# Patient Record
Sex: FEMALE | Race: WHITE | NOT HISPANIC OR LATINO | Employment: OTHER | URBAN - METROPOLITAN AREA
[De-identification: names, ages, dates, MRNs, and addresses within clinical notes are randomized per-mention and may not be internally consistent; named-entity substitution may affect disease eponyms.]

---

## 2018-02-20 ENCOUNTER — OFFICE VISIT (OUTPATIENT)
Dept: URGENT CARE | Facility: CLINIC | Age: 63
End: 2018-02-20
Payer: MEDICARE

## 2018-02-20 VITALS
DIASTOLIC BLOOD PRESSURE: 62 MMHG | BODY MASS INDEX: 28.17 KG/M2 | RESPIRATION RATE: 20 BRPM | HEART RATE: 111 BPM | OXYGEN SATURATION: 98 % | HEIGHT: 64 IN | TEMPERATURE: 103 F | SYSTOLIC BLOOD PRESSURE: 111 MMHG | WEIGHT: 165 LBS

## 2018-02-20 DIAGNOSIS — R11.0 NAUSEA: ICD-10-CM

## 2018-02-20 DIAGNOSIS — R06.02 SOB (SHORTNESS OF BREATH): ICD-10-CM

## 2018-02-20 DIAGNOSIS — J10.1 INFLUENZA A: Primary | ICD-10-CM

## 2018-02-20 DIAGNOSIS — R05.9 COUGH: ICD-10-CM

## 2018-02-20 LAB
CTP QC/QA: YES
FLUAV AG NPH QL: POSITIVE
FLUBV AG NPH QL: NEGATIVE

## 2018-02-20 PROCEDURE — 99203 OFFICE O/P NEW LOW 30 MIN: CPT | Mod: 25,S$GLB,, | Performed by: FAMILY MEDICINE

## 2018-02-20 PROCEDURE — 3008F BODY MASS INDEX DOCD: CPT | Mod: S$GLB,,, | Performed by: FAMILY MEDICINE

## 2018-02-20 PROCEDURE — 94640 AIRWAY INHALATION TREATMENT: CPT | Mod: S$GLB,,, | Performed by: FAMILY MEDICINE

## 2018-02-20 PROCEDURE — 87804 INFLUENZA ASSAY W/OPTIC: CPT | Mod: 59,QW,S$GLB, | Performed by: FAMILY MEDICINE

## 2018-02-20 RX ORDER — LEFLUNOMIDE 20 MG/1
20 TABLET ORAL DAILY
COMMUNITY
End: 2021-06-16

## 2018-02-20 RX ORDER — FOLIC ACID 1 MG/1
1 TABLET ORAL DAILY
COMMUNITY
End: 2021-06-16

## 2018-02-20 RX ORDER — DULOXETIN HYDROCHLORIDE 60 MG/1
60 CAPSULE, DELAYED RELEASE ORAL 2 TIMES DAILY
COMMUNITY
End: 2021-06-16 | Stop reason: SDUPTHER

## 2018-02-20 RX ORDER — HYDROXYCHLOROQUINE SULFATE 200 MG/1
200 TABLET, FILM COATED ORAL DAILY
COMMUNITY
End: 2021-06-16

## 2018-02-20 RX ORDER — GABAPENTIN 300 MG/1
300 CAPSULE ORAL 3 TIMES DAILY
COMMUNITY
End: 2021-06-16

## 2018-02-20 RX ORDER — COLCHICINE 0.6 MG/1
0.6 CAPSULE ORAL
COMMUNITY
End: 2021-06-16

## 2018-02-20 RX ORDER — IBUPROFEN 800 MG/1
800 TABLET ORAL 3 TIMES DAILY
COMMUNITY
End: 2019-12-20 | Stop reason: SDUPTHER

## 2018-02-20 RX ORDER — ONDANSETRON 4 MG/1
4 TABLET, ORALLY DISINTEGRATING ORAL
Status: COMPLETED | OUTPATIENT
Start: 2018-02-20 | End: 2018-02-20

## 2018-02-20 RX ORDER — ALBUTEROL SULFATE 0.83 MG/ML
2.5 SOLUTION RESPIRATORY (INHALATION)
Status: COMPLETED | OUTPATIENT
Start: 2018-02-20 | End: 2018-02-20

## 2018-02-20 RX ORDER — ETODOLAC 400 MG/1
400 TABLET, FILM COATED ORAL 2 TIMES DAILY
COMMUNITY
End: 2021-06-16

## 2018-02-20 RX ORDER — IPRATROPIUM BROMIDE 0.5 MG/2.5ML
0.5 SOLUTION RESPIRATORY (INHALATION)
Status: COMPLETED | OUTPATIENT
Start: 2018-02-20 | End: 2018-02-20

## 2018-02-20 RX ADMIN — ALBUTEROL SULFATE 2.5 MG: 0.83 SOLUTION RESPIRATORY (INHALATION) at 03:02

## 2018-02-20 RX ADMIN — ONDANSETRON 4 MG: 4 TABLET, ORALLY DISINTEGRATING ORAL at 03:02

## 2018-02-20 RX ADMIN — IPRATROPIUM BROMIDE 0.5 MG: 0.5 SOLUTION RESPIRATORY (INHALATION) at 03:02

## 2018-02-20 NOTE — PROGRESS NOTES
"Subjective:       Patient ID: Gisele Meraz is a 62 y.o. female.    Vitals:  height is 5' 4" (1.626 m) and weight is 74.8 kg (165 lb). Her oral temperature is 102.7 °F (39.3 °C) (abnormal). Her blood pressure is 111/62 and her pulse is 111 (abnormal). Her respiration is 20 and oxygen saturation is 98%.     Chief Complaint: Cough    Cough   This is a new problem. The current episode started in the past 7 days. The problem has been gradually worsening. The problem occurs every few minutes. The cough is productive of sputum. Associated symptoms include chills, a fever, hemoptysis, nasal congestion, postnasal drip, shortness of breath and sweats. Pertinent negatives include no chest pain, ear congestion, ear pain, eye redness, headaches, heartburn, myalgias, rash, rhinorrhea, sore throat, weight loss or wheezing. Exacerbated by: heat. She has tried nothing for the symptoms. The treatment provided no relief. Her past medical history is significant for bronchitis and pneumonia. There is no history of asthma, bronchiectasis, COPD, emphysema or environmental allergies.     Review of Systems   Constitution: Positive for chills and fever. Negative for malaise/fatigue and weight loss.   HENT: Positive for postnasal drip. Negative for congestion, ear pain, hoarse voice, rhinorrhea and sore throat.    Eyes: Negative for discharge and redness.   Cardiovascular: Negative for chest pain, dyspnea on exertion and leg swelling.   Respiratory: Positive for cough, hemoptysis, shortness of breath and sputum production. Negative for wheezing.    Skin: Negative for rash.   Musculoskeletal: Negative for myalgias.   Gastrointestinal: Positive for diarrhea. Negative for abdominal pain, heartburn and nausea.   Neurological: Negative for headaches.   Allergic/Immunologic: Negative for environmental allergies.       Objective:      Physical Exam   Constitutional: She is oriented to person, place, and time. She appears well-developed and " well-nourished. She is cooperative.  Non-toxic appearance. She does not appear ill. No distress.   HENT:   Head: Normocephalic and atraumatic.   Right Ear: Hearing, tympanic membrane, external ear and ear canal normal.   Left Ear: Hearing, tympanic membrane, external ear and ear canal normal.   Nose: Nose normal. No mucosal edema, rhinorrhea or nasal deformity. No epistaxis. Right sinus exhibits no maxillary sinus tenderness and no frontal sinus tenderness. Left sinus exhibits no maxillary sinus tenderness and no frontal sinus tenderness.   Mouth/Throat: Uvula is midline, oropharynx is clear and moist and mucous membranes are normal. No trismus in the jaw. Normal dentition. No uvula swelling. No posterior oropharyngeal erythema.   Eyes: Conjunctivae and lids are normal. No scleral icterus.   Sclera clear bilat   Neck: Trachea normal, full passive range of motion without pain and phonation normal. Neck supple.   Cardiovascular: Normal rate, regular rhythm, normal heart sounds, intact distal pulses and normal pulses.    Pulmonary/Chest: No respiratory distress. She has no wheezes. She has no rales.   Labor breathing, accessories muscles use, decrease breath sound, decrease air movement.   Abdominal: Soft. Normal appearance and bowel sounds are normal. She exhibits no distension. There is no tenderness.   Musculoskeletal: Normal range of motion. She exhibits no edema or deformity.   Neurological: She is alert and oriented to person, place, and time. She exhibits normal muscle tone. Coordination normal.   Skin: Skin is warm, dry and intact. She is not diaphoretic. No pallor.   Psychiatric: She has a normal mood and affect. Her speech is normal and behavior is normal. Judgment and thought content normal. Cognition and memory are normal.   Nursing note and vitals reviewed.      Assessment:       1. Influenza A    2. Cough    3. SOB (shortness of breath)    4. Nausea        Plan:         Influenza A  -     Refer to  Emergency Dept.    Cough  -     Cancel: X-Ray Chest PA And Lateral; Future; Expected date: 02/20/2018  -     POCT Influenza A/B    SOB (shortness of breath)  -     albuterol nebulizer solution 2.5 mg; Take 3 mLs (2.5 mg total) by nebulization one time.  -     ipratropium 0.02 % nebulizer solution 0.5 mg; Take 2.5 mLs (0.5 mg total) by nebulization one time.  -     Refer to Emergency Dept.    Nausea  -     ondansetron disintegrating tablet 4 mg; Take 1 tablet (4 mg total) by mouth one time.    Patient  will take patient to [facility] ER for further treatment with Influenza A and SOB.    Juanjo Lo MD

## 2018-02-20 NOTE — PATIENT INSTRUCTIONS
Patient  will take patient to [facility] ER for further treatment with Influenza A and SOB.    Juanjo Lo MD.

## 2019-03-12 ENCOUNTER — OFFICE VISIT (OUTPATIENT)
Dept: URGENT CARE | Facility: CLINIC | Age: 64
End: 2019-03-12
Payer: MEDICARE

## 2019-03-12 VITALS
DIASTOLIC BLOOD PRESSURE: 84 MMHG | HEIGHT: 64 IN | HEART RATE: 86 BPM | SYSTOLIC BLOOD PRESSURE: 141 MMHG | OXYGEN SATURATION: 95 % | RESPIRATION RATE: 19 BRPM | WEIGHT: 145 LBS | TEMPERATURE: 98 F | BODY MASS INDEX: 24.75 KG/M2

## 2019-03-12 DIAGNOSIS — J98.01 ACUTE BRONCHOSPASM: ICD-10-CM

## 2019-03-12 DIAGNOSIS — B96.89 ACUTE BACTERIAL SINUSITIS: Primary | ICD-10-CM

## 2019-03-12 DIAGNOSIS — J01.90 ACUTE BACTERIAL SINUSITIS: Primary | ICD-10-CM

## 2019-03-12 DIAGNOSIS — J20.9 ACUTE PURULENT BRONCHITIS: ICD-10-CM

## 2019-03-12 PROCEDURE — 3008F PR BODY MASS INDEX (BMI) DOCUMENTED: ICD-10-PCS | Mod: CPTII,S$GLB,, | Performed by: INTERNAL MEDICINE

## 2019-03-12 PROCEDURE — 99214 PR OFFICE/OUTPT VISIT, EST, LEVL IV, 30-39 MIN: ICD-10-PCS | Mod: 25,S$GLB,, | Performed by: INTERNAL MEDICINE

## 2019-03-12 PROCEDURE — 96372 PR INJECTION,THERAP/PROPH/DIAG2ST, IM OR SUBCUT: ICD-10-PCS | Mod: S$GLB,,, | Performed by: INTERNAL MEDICINE

## 2019-03-12 PROCEDURE — 99214 OFFICE O/P EST MOD 30 MIN: CPT | Mod: 25,S$GLB,, | Performed by: INTERNAL MEDICINE

## 2019-03-12 PROCEDURE — 96372 THER/PROPH/DIAG INJ SC/IM: CPT | Mod: S$GLB,,, | Performed by: INTERNAL MEDICINE

## 2019-03-12 PROCEDURE — 3008F BODY MASS INDEX DOCD: CPT | Mod: CPTII,S$GLB,, | Performed by: INTERNAL MEDICINE

## 2019-03-12 RX ORDER — CALCIUM CARBONATE 600 MG
600 TABLET ORAL ONCE
COMMUNITY
End: 2021-06-16

## 2019-03-12 RX ORDER — BETAMETHASONE SODIUM PHOSPHATE AND BETAMETHASONE ACETATE 3; 3 MG/ML; MG/ML
9 INJECTION, SUSPENSION INTRA-ARTICULAR; INTRALESIONAL; INTRAMUSCULAR; SOFT TISSUE ONCE
Status: COMPLETED | OUTPATIENT
Start: 2019-03-12 | End: 2019-03-12

## 2019-03-12 RX ORDER — DONEPEZIL HYDROCHLORIDE 5 MG/1
5 TABLET, FILM COATED ORAL NIGHTLY
COMMUNITY
End: 2021-06-16

## 2019-03-12 RX ORDER — MEMANTINE HYDROCHLORIDE 5 MG/1
5 TABLET ORAL 2 TIMES DAILY
COMMUNITY
End: 2021-06-16

## 2019-03-12 RX ORDER — LANOLIN ALCOHOL/MO/W.PET/CERES
100 CREAM (GRAM) TOPICAL DAILY
COMMUNITY
End: 2021-06-16

## 2019-03-12 RX ORDER — DOXYCYCLINE 100 MG/1
100 CAPSULE ORAL 2 TIMES DAILY
Qty: 14 CAPSULE | Refills: 0 | Status: SHIPPED | OUTPATIENT
Start: 2019-03-12 | End: 2019-03-19

## 2019-03-12 RX ORDER — CHOLECALCIFEROL (VITAMIN D3) 25 MCG
1000 TABLET ORAL DAILY
COMMUNITY
End: 2021-06-16

## 2019-03-12 RX ADMIN — BETAMETHASONE SODIUM PHOSPHATE AND BETAMETHASONE ACETATE 9 MG: 3; 3 INJECTION, SUSPENSION INTRA-ARTICULAR; INTRALESIONAL; INTRAMUSCULAR; SOFT TISSUE at 05:03

## 2019-03-12 NOTE — PROGRESS NOTES
"Subjective:       Patient ID: Gisele Meraz is a 63 y.o. female.    Vitals:  height is 5' 4" (1.626 m) and weight is 65.8 kg (145 lb). Her oral temperature is 97.7 °F (36.5 °C). Her blood pressure is 141/84 (abnormal) and her pulse is 86. Her respiration is 19 and oxygen saturation is 95%.     Chief Complaint: URI    Patient started sneezing 5 days ago and complains of congestion dropping to chest yesterday.  Patient is on IV treatment for Rheumatoid Arthritis as of last month.      URI    This is a new problem. The current episode started in the past 7 days (5 days). The problem has been gradually worsening. There has been no fever. Associated symptoms include congestion, coughing and sneezing. Pertinent negatives include no abdominal pain, chest pain, diarrhea, dysuria, ear pain, headaches, joint pain, joint swelling, nausea, neck pain, plugged ear sensation, rash, rhinorrhea, sinus pain, sore throat, swollen glands, vomiting or wheezing. Associated symptoms comments: Fatigue. Treatments tried: Allegra. The treatment provided no relief.       Constitution: Negative for chills, fatigue and fever.   HENT: Positive for congestion. Negative for ear pain, sinus pain and sore throat.    Neck: Negative for neck pain and painful lymph nodes.   Cardiovascular: Negative for chest pain and leg swelling.   Eyes: Negative for double vision and blurred vision.   Respiratory: Positive for cough. Negative for shortness of breath and wheezing.    Gastrointestinal: Negative for abdominal pain, nausea, vomiting and diarrhea.   Genitourinary: Negative for dysuria, frequency, urgency and history of kidney stones.   Musculoskeletal: Negative for joint pain, joint swelling, muscle cramps and muscle ache.   Skin: Negative for color change, pale, rash and bruising.   Allergic/Immunologic: Positive for sneezing. Negative for seasonal allergies.   Neurological: Negative for dizziness, history of vertigo, light-headedness, passing out and " headaches.   Hematologic/Lymphatic: Negative for swollen lymph nodes.   Psychiatric/Behavioral: Negative for nervous/anxious, sleep disturbance and depression. The patient is not nervous/anxious.        Objective:      Physical Exam   Constitutional: She appears well-developed and well-nourished.   HENT:   Head: Normocephalic and atraumatic.   Nose: Mucosal edema (purulent mucous) present. Right sinus exhibits maxillary sinus tenderness. Left sinus exhibits maxillary sinus tenderness.   Eyes: Conjunctivae and EOM are normal. Pupils are equal, round, and reactive to light.   Neck: Normal range of motion. Neck supple.   Cardiovascular: Normal rate and regular rhythm.   Pulmonary/Chest: Effort normal. She has wheezes.   Nursing note and vitals reviewed.      Assessment:       1. Acute bacterial sinusitis    2. Acute purulent bronchitis    3. Acute bronchospasm        Plan:         Acute bacterial sinusitis  -     doxycycline (VIBRAMYCIN) 100 MG Cap; Take 1 capsule (100 mg total) by mouth 2 (two) times daily. for 7 days  Dispense: 14 capsule; Refill: 0    Acute purulent bronchitis    Acute bronchospasm  -     betamethasone acetate-betamethasone sodium phosphate injection 9 mg

## 2019-03-13 ENCOUNTER — NURSE TRIAGE (OUTPATIENT)
Dept: ADMINISTRATIVE | Facility: CLINIC | Age: 64
End: 2019-03-13

## 2019-03-13 NOTE — TELEPHONE ENCOUNTER
Was seen at urgent care yesterday as noted. Was given an injection and Vibramycin and been taken since visit. Since 9 pm has had 4 coughing spells. Not producing much sputum and she is cold.     Reason for Disposition   Message left on identified voice mail    Protocols used: NO CONTACT OR DUPLICATE CONTACT CALL-A-

## 2019-03-13 NOTE — TELEPHONE ENCOUNTER
Reason for Disposition   [1] Sinus infection AND [2] taking an antibiotic   Bacterial sinusitis, questions about    Protocols used: RECENT MEDICAL VISIT FOR ILLNESS FOLLOW-UP CALL-A-, SINUS INFECTION ON ANTIBIOTIC FOLLOW-UP CALL-A-AH

## 2019-12-20 ENCOUNTER — HOSPITAL ENCOUNTER (EMERGENCY)
Facility: HOSPITAL | Age: 64
Discharge: HOME | End: 2019-12-20
Attending: EMERGENCY MEDICINE
Payer: MEDICARE

## 2019-12-20 VITALS
HEART RATE: 83 BPM | RESPIRATION RATE: 22 BRPM | TEMPERATURE: 98 F | SYSTOLIC BLOOD PRESSURE: 116 MMHG | OXYGEN SATURATION: 99 % | DIASTOLIC BLOOD PRESSURE: 59 MMHG

## 2019-12-20 DIAGNOSIS — S06.9X1A CLOSED HEAD INJURY WITH BRIEF LOSS OF CONSCIOUSNESS: ICD-10-CM
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
## 2019-12-20 PROCEDURE — 25000003 PHARM REV CODE 250: Performed by: EMERGENCY MEDICINE

## 2019-12-20 PROCEDURE — 99284 EMERGENCY DEPT VISIT MOD MDM: CPT | Mod: 25,HCNC

## 2019-12-20 RX ORDER — ONDANSETRON 4 MG/1
4 TABLET, ORALLY DISINTEGRATING ORAL
Status: COMPLETED | OUTPATIENT
Start: 2019-12-20 | End: 2019-12-20

## 2019-12-20 RX ORDER — ACETAMINOPHEN 500 MG
1000 TABLET ORAL
Status: COMPLETED | OUTPATIENT
Start: 2019-12-20 | End: 2019-12-20

## 2019-12-20 RX ORDER — IBUPROFEN 800 MG/1
800 TABLET ORAL 3 TIMES DAILY
Qty: 12 TABLET | Refills: 0 | Status: SHIPPED | OUTPATIENT
Start: 2019-12-20 | End: 2019-12-24

## 2019-12-20 RX ADMIN — ACETAMINOPHEN 1000 MG: 500 TABLET ORAL at 09:12

## 2019-12-20 RX ADMIN — ONDANSETRON 4 MG: 4 TABLET, ORALLY DISINTEGRATING ORAL at 09:12

## 2019-12-21 NOTE — ED PROVIDER NOTES
Encounter Date: 2019    SCRIBE #1 NOTE: I, Tip Salinas, am scribing for, and in the presence of, Dr. Oden.       History   No chief complaint on file.    Time seen by provider: 9:33 PM on 2019      Gisele Meraz is a 64 y.o. female with a PMHx of pneumonia, arthritis, and fibromyalgia who presents to the ED for right sided head pain that started < 1 hour PTA. The  reports that the patient was trying to get up from her chair at a restaurant, when the patient lost her balance and fell backwards. Patient reports that she hit the back of her head on the wall.  denies the patient having LOC. Per patient, she is having pain to the right side of her head. Per patient, her headache is still present and is a sharper pain. The  states that the patient was ambulatory since the accident. The patient denies visual disturbances, LOC, vomiting, neck pain, SOB, abdominal pain, or any other complaint at this time. The patient has a PSHx of adenoidectomy and tonsillectomy.    The history is provided by the patient and the spouse.     Review of patient's allergies indicates:   Allergen Reactions    Penicillins Swelling     Past Medical History:   Diagnosis Date    Arthritis     Fibromyalgia     Pneumonia      Past Surgical History:   Procedure Laterality Date    ADENOIDECTOMY       SECTION      HYSTERECTOMY      TONSILLECTOMY       No family history on file.  Social History     Tobacco Use    Smoking status: Current Every Day Smoker    Smokeless tobacco: Never Used   Substance Use Topics    Alcohol use: Not on file    Drug use: Not on file     Review of Systems   Constitutional: Negative for fever.   HENT: Negative for congestion.    Eyes: Negative for visual disturbance.   Respiratory: Negative for wheezing.    Cardiovascular: Negative for chest pain.   Gastrointestinal: Negative for abdominal pain, diarrhea, nausea and vomiting.   Genitourinary: Negative for dysuria.    Musculoskeletal: Negative for back pain, joint swelling and neck pain.   Skin: Negative for rash.   Neurological: Positive for headaches. Negative for seizures and syncope.   Hematological: Does not bruise/bleed easily.   Psychiatric/Behavioral: Negative for confusion.       Physical Exam     Initial Vitals   BP Pulse Resp Temp SpO2   -- -- -- -- --      MAP       --         Physical Exam    Nursing note and vitals reviewed.  Constitutional: She appears well-nourished.   HENT:   Head: Normocephalic and atraumatic.   Right Ear: Hearing, tympanic membrane, external ear and ear canal normal. No hemotympanum.   Left Ear: Hearing, tympanic membrane, external ear and ear canal normal. No hemotympanum.   Eyes: Conjunctivae and EOM are normal.   Neck: Normal range of motion. Neck supple. No thyroid mass present.   Cardiovascular: Normal rate, regular rhythm and normal heart sounds. Exam reveals no gallop and no friction rub.    No murmur heard.  Pulmonary/Chest: Breath sounds normal. She has no wheezes. She has no rhonchi. She has no rales.   Abdominal: Soft. Normal appearance and bowel sounds are normal. There is no tenderness.   Neurological: She is alert and oriented to person, place, and time. She has normal strength. No cranial nerve deficit or sensory deficit.   Skin: Skin is warm and dry. No rash noted. No erythema.   Psychiatric: She has a normal mood and affect. Her speech is normal. Cognition and memory are normal.         ED Course   Procedures  Labs Reviewed - No data to display       Imaging Results    None          Medical Decision Making:   History:   Old Medical Records: I decided to obtain old medical records.  Clinical Tests:   Radiological Study: Ordered and Reviewed  ED Management:  This patient was interviewed and assessed emergently.  She is alert and sober.  There are no open wounds.  Patient denies neck pain or trauma to any additional bodily areas.  Patient had some improvement here with p.o.  Tylenol.  CT scan indicates no acute intracranial process.  Patient and  will be educated about closed-head injuries and suspected concussion.  They will be provided information regarding a concussion Clinic on the [place] and asked to follow up with this as soon as possible.  They are further educated about concerning signs and symptoms to look out for, these including vomiting, worsening headache, neurologic symptoms.  They are asked return for these or any new, concerning, or worsening symptoms immediately, as possibility for slow bleed does exist.  Patient and  agreeable with this plan for follow-up and monitoring at home and she is discharged in stable condition.            Scribe Attestation:   Scribe #1: I performed the above scribed service and the documentation accurately describes the services I performed. I attest to the accuracy of the note.    I, Dr. Arnulfo Oden, personally performed the services described in this documentation. All medical record entries made by the scribe were at my direction and in my presence.  I have reviewed the chart and agree that the record reflects my personal performance and is accurate and complete. Arnulfo Oden MD.  5:55 AM 12/21/2019                        Clinical Impression:       ICD-10-CM ICD-9-CM   1. Fall on same level from slipping, tripping or stumbling, initial encounter W01.0XXA E885.9   2. Closed head injury with brief loss of consciousness S06.9X9A 850.5         Disposition:   Disposition: Discharged  Condition: Stable                     Arnulfo Oden MD  12/21/19 0555

## 2019-12-21 NOTE — ED NOTES
"Pt states "Yes I feel better.  Can I please go home?" Pt still endorses that her head hurts but states "I just fell and bumped my head. It's fine. The X-ray will show the brain of a typical 65 year old!".  No Vomiting noted.  Bed remains in low and locked position, Bed rails up x 2 and call light remains within reach  "

## 2019-12-21 NOTE — ED NOTES
Patient identifiers for Gisele Meraz checked and correct.  LOC:  Gisele Meraz is awake, alert, and aware of environment with an appropriate affect. She is oriented x 3 and speaking appropriately.  APPEARANCE:  She is resting comfortably and in moderate distress. She is clean and well groomed, patient's clothing is properly fastened.  SKIN:  The skin is warm and dry. She has normal skin turgor and moist mucus membranes. Skin is intact; no bruising or breakdown noted.  MUSCULOSKELETAL:  She is moving all extremities well, no obvious deformities noted. Pulses intact.   RESPIRATORY:  Airway is open and patent. Respirations are spontaneous and non-labored with normal effort and rate.  CARDIAC:  She has a normal rate and rhythm. No peripheral edema noted. Capillary refill < 3 seconds.  ABDOMEN:  No distention noted.  Soft and non-tender upon palpation.  NEUROLOGICAL:  PERRL. Facial expression is symmetrical. Hand grasps are equal bilaterally. Normal sensation in all extremities when touched with finger.  Pt endorses profound dizziness and H/A after fall back into a wall striking head hard.  Pain to site.  No LOC but spouse states that she keeps falling asleep and is not acting herself.  No N/V noted.  Pt arouses easily.    Allergies reported:    Review of patient's allergies indicates:   Allergen Reactions    Penicillins Swelling     OTHER NOTES:

## 2020-09-17 ENCOUNTER — OFFICE VISIT (OUTPATIENT)
Dept: URGENT CARE | Facility: CLINIC | Age: 65
End: 2020-09-17
Payer: MEDICARE

## 2020-09-17 VITALS
OXYGEN SATURATION: 96 % | TEMPERATURE: 97 F | HEART RATE: 77 BPM | SYSTOLIC BLOOD PRESSURE: 134 MMHG | HEIGHT: 64 IN | RESPIRATION RATE: 16 BRPM | DIASTOLIC BLOOD PRESSURE: 81 MMHG | WEIGHT: 135 LBS | BODY MASS INDEX: 23.05 KG/M2

## 2020-09-17 DIAGNOSIS — M79.2 NEURALGIA: Primary | ICD-10-CM

## 2020-09-17 PROCEDURE — 96372 THER/PROPH/DIAG INJ SC/IM: CPT | Mod: S$GLB,,, | Performed by: INTERNAL MEDICINE

## 2020-09-17 PROCEDURE — 96372 PR INJECTION,THERAP/PROPH/DIAG2ST, IM OR SUBCUT: ICD-10-PCS | Mod: S$GLB,,, | Performed by: INTERNAL MEDICINE

## 2020-09-17 PROCEDURE — 99214 OFFICE O/P EST MOD 30 MIN: CPT | Mod: 25,S$GLB,, | Performed by: INTERNAL MEDICINE

## 2020-09-17 PROCEDURE — 99214 PR OFFICE/OUTPT VISIT, EST, LEVL IV, 30-39 MIN: ICD-10-PCS | Mod: 25,S$GLB,, | Performed by: INTERNAL MEDICINE

## 2020-09-17 RX ORDER — METHYLPREDNISOLONE 4 MG/1
TABLET ORAL
Qty: 1 PACKAGE | Refills: 0 | Status: SHIPPED | OUTPATIENT
Start: 2020-09-17 | End: 2021-06-16

## 2020-09-17 RX ORDER — KETOROLAC TROMETHAMINE 30 MG/ML
60 INJECTION, SOLUTION INTRAMUSCULAR; INTRAVENOUS
Status: COMPLETED | OUTPATIENT
Start: 2020-09-17 | End: 2020-09-17

## 2020-09-17 RX ADMIN — KETOROLAC TROMETHAMINE 60 MG: 30 INJECTION, SOLUTION INTRAMUSCULAR; INTRAVENOUS at 04:09

## 2020-09-17 NOTE — PROGRESS NOTES
"Subjective:       Patient ID: Gisele Meraz is a 65 y.o. female.    Vitals:  height is 5' 4" (1.626 m) and weight is 61.2 kg (135 lb). Her temperature is 97.4 °F (36.3 °C). Her blood pressure is 134/81 and her pulse is 77. Her respiration is 16 and oxygen saturation is 96%.     Chief Complaint: Arm Pain    Right arm spasms that began an hour ago. Pt has swlling     Arm Pain   The incident occurred less than 1 hour ago. There was no injury mechanism. The quality of the pain is described as shooting. The pain radiates to the right arm. The pain is at a severity of 10/10. The pain is severe. The pain has been constant since the incident. Pertinent negatives include no chest pain. The symptoms are aggravated by movement. She has tried nothing for the symptoms.       Constitution: Negative for chills, fatigue and fever.   HENT: Negative for congestion and sore throat.    Neck: Negative for painful lymph nodes.   Cardiovascular: Negative for chest pain and leg swelling.   Eyes: Negative for double vision and blurred vision.   Respiratory: Negative for cough and shortness of breath.    Gastrointestinal: Negative for nausea, vomiting and diarrhea.   Genitourinary: Negative for dysuria, frequency, urgency and history of kidney stones.   Musculoskeletal: Positive for pain and joint pain. Negative for joint swelling, muscle cramps and muscle ache.   Skin: Negative for color change, pale, rash and bruising.   Allergic/Immunologic: Negative for seasonal allergies.   Neurological: Negative for dizziness, history of vertigo, light-headedness, passing out and headaches.   Hematologic/Lymphatic: Negative for swollen lymph nodes.   Psychiatric/Behavioral: Negative for nervous/anxious, sleep disturbance and depression. The patient is not nervous/anxious.        Objective:      Physical Exam   Neck: Normal range of motion. Neck supple.   Abdominal: Normal appearance.   Neurological: She is alert.   Skin:         Comments: Neuralgic " pain R arm with ROM at shoulder; pain down upper arm to prox forearm;motor intact; pulses intact; skin warm and dry ;skin normal color   Nursing note and vitals reviewed.        Assessment:       1. Neuralgia        Plan:         Neuralgia  -     ketorolac injection 60 mg  -     methylPREDNISolone (MEDROL DOSEPACK) 4 mg tablet; use as directed  Dispense: 1 Package; Refill: 0

## 2021-03-26 ENCOUNTER — TELEPHONE (OUTPATIENT)
Dept: NEUROLOGY | Facility: CLINIC | Age: 66
End: 2021-03-26

## 2021-03-29 ENCOUNTER — TELEPHONE (OUTPATIENT)
Dept: NEUROLOGY | Facility: CLINIC | Age: 66
End: 2021-03-29

## 2021-03-29 ENCOUNTER — OFFICE VISIT (OUTPATIENT)
Dept: NEUROLOGY | Facility: CLINIC | Age: 66
End: 2021-03-29
Payer: MEDICARE

## 2021-03-29 VITALS
DIASTOLIC BLOOD PRESSURE: 87 MMHG | HEART RATE: 78 BPM | BODY MASS INDEX: 23.17 KG/M2 | HEIGHT: 64 IN | SYSTOLIC BLOOD PRESSURE: 146 MMHG

## 2021-03-29 DIAGNOSIS — F02.80 EARLY ONSET ALZHEIMER'S DEMENTIA WITHOUT BEHAVIORAL DISTURBANCE: ICD-10-CM

## 2021-03-29 DIAGNOSIS — G30.0 EARLY ONSET ALZHEIMER'S DEMENTIA WITHOUT BEHAVIORAL DISTURBANCE: ICD-10-CM

## 2021-03-29 PROCEDURE — 3288F FALL RISK ASSESSMENT DOCD: CPT | Mod: CPTII,S$GLB,, | Performed by: PSYCHIATRY & NEUROLOGY

## 2021-03-29 PROCEDURE — 99999 PR PBB SHADOW E&M-EST. PATIENT-LVL III: CPT | Mod: PBBFAC,,, | Performed by: PSYCHIATRY & NEUROLOGY

## 2021-03-29 PROCEDURE — 99204 PR OFFICE/OUTPT VISIT, NEW, LEVL IV, 45-59 MIN: ICD-10-PCS | Mod: S$GLB,,, | Performed by: PSYCHIATRY & NEUROLOGY

## 2021-03-29 PROCEDURE — 3288F PR FALLS RISK ASSESSMENT DOCUMENTED: ICD-10-PCS | Mod: CPTII,S$GLB,, | Performed by: PSYCHIATRY & NEUROLOGY

## 2021-03-29 PROCEDURE — 3008F PR BODY MASS INDEX (BMI) DOCUMENTED: ICD-10-PCS | Mod: CPTII,S$GLB,, | Performed by: PSYCHIATRY & NEUROLOGY

## 2021-03-29 PROCEDURE — 1101F PR PT FALLS ASSESS DOC 0-1 FALLS W/OUT INJ PAST YR: ICD-10-PCS | Mod: CPTII,S$GLB,, | Performed by: PSYCHIATRY & NEUROLOGY

## 2021-03-29 PROCEDURE — 3008F BODY MASS INDEX DOCD: CPT | Mod: CPTII,S$GLB,, | Performed by: PSYCHIATRY & NEUROLOGY

## 2021-03-29 PROCEDURE — 1101F PT FALLS ASSESS-DOCD LE1/YR: CPT | Mod: CPTII,S$GLB,, | Performed by: PSYCHIATRY & NEUROLOGY

## 2021-03-29 PROCEDURE — 99999 PR PBB SHADOW E&M-EST. PATIENT-LVL III: ICD-10-PCS | Mod: PBBFAC,,, | Performed by: PSYCHIATRY & NEUROLOGY

## 2021-03-29 PROCEDURE — 1126F AMNT PAIN NOTED NONE PRSNT: CPT | Mod: S$GLB,,, | Performed by: PSYCHIATRY & NEUROLOGY

## 2021-03-29 PROCEDURE — 99204 OFFICE O/P NEW MOD 45 MIN: CPT | Mod: S$GLB,,, | Performed by: PSYCHIATRY & NEUROLOGY

## 2021-03-29 PROCEDURE — 1126F PR PAIN SEVERITY QUANTIFIED, NO PAIN PRESENT: ICD-10-PCS | Mod: S$GLB,,, | Performed by: PSYCHIATRY & NEUROLOGY

## 2021-03-31 ENCOUNTER — HOSPITAL ENCOUNTER (OUTPATIENT)
Dept: RADIOLOGY | Facility: HOSPITAL | Age: 66
Discharge: HOME | End: 2021-03-31
Attending: PSYCHIATRY & NEUROLOGY
Payer: MEDICARE

## 2021-03-31 DIAGNOSIS — F02.80 EARLY ONSET ALZHEIMER'S DEMENTIA WITHOUT BEHAVIORAL DISTURBANCE: ICD-10-CM

## 2021-03-31 DIAGNOSIS — G30.0 EARLY ONSET ALZHEIMER'S DEMENTIA WITHOUT BEHAVIORAL DISTURBANCE: ICD-10-CM

## 2021-03-31 PROCEDURE — 70551 MRI BRAIN STEM W/O DYE: CPT | Mod: 26,,, | Performed by: RADIOLOGY

## 2021-03-31 PROCEDURE — 70551 MRI BRAIN STEM W/O DYE: CPT | Mod: TC

## 2021-03-31 PROCEDURE — 70551 MRI BRAIN WITHOUT CONTRAST: ICD-10-PCS | Mod: 26,,, | Performed by: RADIOLOGY

## 2021-04-01 ENCOUNTER — PATIENT MESSAGE (OUTPATIENT)
Dept: NEUROLOGY | Facility: CLINIC | Age: 66
End: 2021-04-01

## 2021-04-01 ENCOUNTER — TELEPHONE (OUTPATIENT)
Dept: NEUROLOGY | Facility: CLINIC | Age: 66
End: 2021-04-01

## 2021-05-10 ENCOUNTER — OFFICE VISIT (OUTPATIENT)
Dept: NEUROLOGY | Facility: CLINIC | Age: 66
End: 2021-05-10
Payer: MEDICARE

## 2021-05-10 DIAGNOSIS — F02.818 EARLY ONSET ALZHEIMER'S DISEASE WITH BEHAVIORAL DISTURBANCE: ICD-10-CM

## 2021-05-10 DIAGNOSIS — G30.0 EARLY ONSET ALZHEIMER'S DISEASE WITH BEHAVIORAL DISTURBANCE: ICD-10-CM

## 2021-05-10 PROCEDURE — 96116 PR NEUROBEHAVIORAL STATUS EXAM BY PSYCH/PHYS: ICD-10-PCS | Mod: 95,,, | Performed by: PSYCHIATRY & NEUROLOGY

## 2021-05-10 PROCEDURE — 96116 NUBHVL XM PHYS/QHP 1ST HR: CPT | Mod: 95,,, | Performed by: PSYCHIATRY & NEUROLOGY

## 2021-05-10 PROCEDURE — 99499 UNLISTED E&M SERVICE: CPT | Mod: 95,,, | Performed by: PSYCHIATRY & NEUROLOGY

## 2021-05-10 PROCEDURE — 99499 NO LOS: ICD-10-PCS | Mod: 95,,, | Performed by: PSYCHIATRY & NEUROLOGY

## 2021-05-14 ENCOUNTER — OFFICE VISIT (OUTPATIENT)
Dept: NEUROLOGY | Facility: CLINIC | Age: 66
End: 2021-05-14
Payer: MEDICARE

## 2021-05-14 DIAGNOSIS — G30.0 EARLY ONSET ALZHEIMER'S DISEASE WITH BEHAVIORAL DISTURBANCE: ICD-10-CM

## 2021-05-14 DIAGNOSIS — F02.818 EARLY ONSET ALZHEIMER'S DISEASE WITH BEHAVIORAL DISTURBANCE: ICD-10-CM

## 2021-05-14 DIAGNOSIS — F02.80 EARLY ONSET ALZHEIMER'S DEMENTIA WITHOUT BEHAVIORAL DISTURBANCE: ICD-10-CM

## 2021-05-14 DIAGNOSIS — G30.0 EARLY ONSET ALZHEIMER'S DEMENTIA WITHOUT BEHAVIORAL DISTURBANCE: ICD-10-CM

## 2021-05-14 PROCEDURE — 96132 PR NEUROPSYCHOLOGIC TEST EVAL SVCS, 1ST HR: ICD-10-PCS | Mod: S$GLB,,, | Performed by: PSYCHIATRY & NEUROLOGY

## 2021-05-14 PROCEDURE — 96133 NRPSYC TST EVAL PHYS/QHP EA: CPT | Mod: S$GLB,,, | Performed by: PSYCHIATRY & NEUROLOGY

## 2021-05-14 PROCEDURE — 96138 PR PSYCH/NEUROPSYCH TEST ADMIN/SCORING, BY TECH, 2+ TESTS, 1ST 30 MIN: ICD-10-PCS | Mod: S$GLB,,, | Performed by: PSYCHIATRY & NEUROLOGY

## 2021-05-14 PROCEDURE — 99499 NO LOS: ICD-10-PCS | Mod: S$GLB,,, | Performed by: PSYCHIATRY & NEUROLOGY

## 2021-05-14 PROCEDURE — 96138 PSYCL/NRPSYC TECH 1ST: CPT | Mod: S$GLB,,, | Performed by: PSYCHIATRY & NEUROLOGY

## 2021-05-14 PROCEDURE — 96139 PSYCL/NRPSYC TST TECH EA: CPT | Mod: S$GLB,,, | Performed by: PSYCHIATRY & NEUROLOGY

## 2021-05-14 PROCEDURE — 96132 NRPSYC TST EVAL PHYS/QHP 1ST: CPT | Mod: S$GLB,,, | Performed by: PSYCHIATRY & NEUROLOGY

## 2021-05-14 PROCEDURE — 1157F ADVNC CARE PLAN IN RCRD: CPT | Mod: S$GLB,,, | Performed by: PSYCHIATRY & NEUROLOGY

## 2021-05-14 PROCEDURE — 99499 UNLISTED E&M SERVICE: CPT | Mod: S$GLB,,, | Performed by: PSYCHIATRY & NEUROLOGY

## 2021-05-14 PROCEDURE — 96133 PR NEUROPSYCHOLOGIC TEST EVAL SVCS, EA ADDTL HR: ICD-10-PCS | Mod: S$GLB,,, | Performed by: PSYCHIATRY & NEUROLOGY

## 2021-05-14 PROCEDURE — 1157F PR ADVANCE CARE PLAN OR EQUIV PRESENT IN MEDICAL RECORD: ICD-10-PCS | Mod: S$GLB,,, | Performed by: PSYCHIATRY & NEUROLOGY

## 2021-05-14 PROCEDURE — 96139 PR PSYCH/NEUROPSYCH TEST ADMIN/SCORING, BY TECH, 2+ TESTS, EA ADDTL 30 MIN: ICD-10-PCS | Mod: S$GLB,,, | Performed by: PSYCHIATRY & NEUROLOGY

## 2021-05-20 ENCOUNTER — PATIENT MESSAGE (OUTPATIENT)
Dept: NEUROLOGY | Facility: CLINIC | Age: 66
End: 2021-05-20

## 2021-05-20 ENCOUNTER — OFFICE VISIT (OUTPATIENT)
Dept: NEUROLOGY | Facility: CLINIC | Age: 66
End: 2021-05-20
Payer: MEDICARE

## 2021-05-20 DIAGNOSIS — F02.818 EARLY ONSET ALZHEIMER'S DISEASE WITH BEHAVIORAL DISTURBANCE: Primary | ICD-10-CM

## 2021-05-20 DIAGNOSIS — G30.0 EARLY ONSET ALZHEIMER'S DISEASE WITH BEHAVIORAL DISTURBANCE: Primary | ICD-10-CM

## 2021-05-20 PROCEDURE — 99499 UNLISTED E&M SERVICE: CPT | Mod: 95,,, | Performed by: PSYCHIATRY & NEUROLOGY

## 2021-05-20 PROCEDURE — 99499 NO LOS: ICD-10-PCS | Mod: 95,,, | Performed by: PSYCHIATRY & NEUROLOGY

## 2021-05-20 PROCEDURE — 1157F PR ADVANCE CARE PLAN OR EQUIV PRESENT IN MEDICAL RECORD: ICD-10-PCS | Mod: 95,,, | Performed by: PSYCHIATRY & NEUROLOGY

## 2021-05-20 PROCEDURE — 1157F ADVNC CARE PLAN IN RCRD: CPT | Mod: 95,,, | Performed by: PSYCHIATRY & NEUROLOGY

## 2021-05-24 ENCOUNTER — OUTPATIENT CASE MANAGEMENT (OUTPATIENT)
Dept: NEUROLOGY | Facility: CLINIC | Age: 66
End: 2021-05-24

## 2021-05-25 ENCOUNTER — OUTPATIENT CASE MANAGEMENT (OUTPATIENT)
Dept: NEUROLOGY | Facility: CLINIC | Age: 66
End: 2021-05-25

## 2021-05-27 DIAGNOSIS — R94.31 QT PROLONGATION: Primary | ICD-10-CM

## 2021-05-31 ENCOUNTER — TELEPHONE (OUTPATIENT)
Dept: NEUROLOGY | Facility: CLINIC | Age: 66
End: 2021-05-31

## 2021-06-03 ENCOUNTER — PATIENT MESSAGE (OUTPATIENT)
Dept: NEUROLOGY | Facility: CLINIC | Age: 66
End: 2021-06-03

## 2021-06-03 ENCOUNTER — HISTORICAL (OUTPATIENT)
Dept: CARDIOLOGY | Facility: HOSPITAL | Age: 66
End: 2021-06-03

## 2021-06-04 ENCOUNTER — OUTPATIENT CASE MANAGEMENT (OUTPATIENT)
Dept: NEUROLOGY | Facility: CLINIC | Age: 66
End: 2021-06-04

## 2021-06-07 ENCOUNTER — PATIENT MESSAGE (OUTPATIENT)
Dept: NEUROLOGY | Facility: CLINIC | Age: 66
End: 2021-06-07

## 2021-06-08 ENCOUNTER — OUTPATIENT CASE MANAGEMENT (OUTPATIENT)
Dept: NEUROLOGY | Facility: CLINIC | Age: 66
End: 2021-06-08

## 2021-06-10 ENCOUNTER — HISTORICAL (OUTPATIENT)
Dept: LAB | Facility: HOSPITAL | Age: 66
End: 2021-06-10

## 2021-06-15 ENCOUNTER — TELEPHONE (OUTPATIENT)
Dept: NEUROLOGY | Facility: CLINIC | Age: 66
End: 2021-06-15

## 2021-06-15 ENCOUNTER — PATIENT MESSAGE (OUTPATIENT)
Dept: NEUROLOGY | Facility: CLINIC | Age: 66
End: 2021-06-15

## 2021-06-16 ENCOUNTER — PATIENT MESSAGE (OUTPATIENT)
Dept: NEUROLOGY | Facility: CLINIC | Age: 66
End: 2021-06-16

## 2021-06-16 ENCOUNTER — OFFICE VISIT (OUTPATIENT)
Dept: NEUROLOGY | Facility: CLINIC | Age: 66
End: 2021-06-16
Payer: MEDICARE

## 2021-06-16 DIAGNOSIS — G30.0 EARLY ONSET ALZHEIMER'S DISEASE WITH BEHAVIORAL DISTURBANCE: Primary | ICD-10-CM

## 2021-06-16 DIAGNOSIS — R41.3 MEMORY LOSS: ICD-10-CM

## 2021-06-16 DIAGNOSIS — F02.818 EARLY ONSET ALZHEIMER'S DISEASE WITH BEHAVIORAL DISTURBANCE: Primary | ICD-10-CM

## 2021-06-16 PROCEDURE — 99215 OFFICE O/P EST HI 40 MIN: CPT | Mod: 95,,, | Performed by: NURSE PRACTITIONER

## 2021-06-16 PROCEDURE — 99215 PR OFFICE/OUTPT VISIT, EST, LEVL V, 40-54 MIN: ICD-10-PCS | Mod: 95,,, | Performed by: NURSE PRACTITIONER

## 2021-06-16 RX ORDER — DULOXETIN HYDROCHLORIDE 60 MG/1
60 CAPSULE, DELAYED RELEASE ORAL 2 TIMES DAILY
Qty: 180 CAPSULE | Refills: 3 | Status: SHIPPED | OUTPATIENT
Start: 2021-06-16 | End: 2022-04-04 | Stop reason: SDUPTHER

## 2021-06-16 RX ORDER — QUETIAPINE FUMARATE 25 MG/1
25 TABLET, FILM COATED ORAL EVERY MORNING
COMMUNITY
End: 2021-06-16 | Stop reason: SDUPTHER

## 2021-06-16 RX ORDER — DONEPEZIL HYDROCHLORIDE 10 MG/1
10 TABLET, FILM COATED ORAL DAILY
Qty: 90 TABLET | Refills: 3 | Status: SHIPPED | OUTPATIENT
Start: 2021-06-16 | End: 2022-04-04 | Stop reason: SDUPTHER

## 2021-06-16 RX ORDER — ALPRAZOLAM 0.5 MG/1
0.5 TABLET ORAL DAILY PRN
COMMUNITY
End: 2022-04-04

## 2021-06-16 RX ORDER — QUETIAPINE FUMARATE 25 MG/1
25 TABLET, FILM COATED ORAL EVERY MORNING
Qty: 90 TABLET | Refills: 3 | Status: SHIPPED | OUTPATIENT
Start: 2021-06-16 | End: 2021-08-10 | Stop reason: SDUPTHER

## 2021-06-16 RX ORDER — MEMANTINE HYDROCHLORIDE 10 MG/1
10 TABLET ORAL 2 TIMES DAILY
Qty: 180 TABLET | Refills: 3 | Status: SHIPPED | OUTPATIENT
Start: 2021-06-16 | End: 2022-04-04 | Stop reason: SDUPTHER

## 2021-06-18 ENCOUNTER — TELEPHONE (OUTPATIENT)
Dept: NEUROLOGY | Facility: CLINIC | Age: 66
End: 2021-06-18

## 2021-06-18 ENCOUNTER — HISTORICAL (OUTPATIENT)
Dept: ADMINISTRATIVE | Facility: HOSPITAL | Age: 66
End: 2021-06-18

## 2021-06-18 LAB
BUN SERPL-MCNC: 10.4 MG/DL (ref 9.8–20.1)
CREAT SERPL-MCNC: 0.78 MG/DL (ref 0.57–1.11)
POC CREATININE: 0.8 MG/DL (ref 0.6–1.3)

## 2021-06-21 ENCOUNTER — TELEPHONE (OUTPATIENT)
Dept: NEUROLOGY | Facility: CLINIC | Age: 66
End: 2021-06-21

## 2021-07-06 ENCOUNTER — OUTPATIENT CASE MANAGEMENT (OUTPATIENT)
Dept: NEUROLOGY | Facility: CLINIC | Age: 66
End: 2021-07-06

## 2021-07-07 ENCOUNTER — PATIENT MESSAGE (OUTPATIENT)
Dept: NEUROLOGY | Facility: CLINIC | Age: 66
End: 2021-07-07

## 2021-08-09 ENCOUNTER — PATIENT MESSAGE (OUTPATIENT)
Dept: NEUROLOGY | Facility: CLINIC | Age: 66
End: 2021-08-09

## 2021-08-10 ENCOUNTER — OFFICE VISIT (OUTPATIENT)
Dept: NEUROLOGY | Facility: CLINIC | Age: 66
End: 2021-08-10
Payer: MEDICARE

## 2021-08-10 DIAGNOSIS — G30.0 EARLY ONSET ALZHEIMER'S DISEASE WITH BEHAVIORAL DISTURBANCE: ICD-10-CM

## 2021-08-10 DIAGNOSIS — F02.818 EARLY ONSET ALZHEIMER'S DISEASE WITH BEHAVIORAL DISTURBANCE: ICD-10-CM

## 2021-08-10 PROCEDURE — 90846 PR FAMILY PSYCHOTHERAPY W/O PT, 50 MIN: ICD-10-PCS | Mod: 95,,, | Performed by: PSYCHIATRY & NEUROLOGY

## 2021-08-10 PROCEDURE — 99499 NO LOS: ICD-10-PCS | Mod: 95,,, | Performed by: PSYCHIATRY & NEUROLOGY

## 2021-08-10 PROCEDURE — 99499 UNLISTED E&M SERVICE: CPT | Mod: 95,,, | Performed by: PSYCHIATRY & NEUROLOGY

## 2021-08-10 PROCEDURE — 90846 FAMILY PSYTX W/O PT 50 MIN: CPT | Mod: 95,,, | Performed by: PSYCHIATRY & NEUROLOGY

## 2021-08-10 RX ORDER — QUETIAPINE FUMARATE 25 MG/1
25 TABLET, FILM COATED ORAL 2 TIMES DAILY
Qty: 180 TABLET | Refills: 3 | Status: SHIPPED | OUTPATIENT
Start: 2021-08-10 | End: 2022-04-04 | Stop reason: SDUPTHER

## 2021-08-12 ENCOUNTER — OUTPATIENT CASE MANAGEMENT (OUTPATIENT)
Dept: NEUROLOGY | Facility: CLINIC | Age: 66
End: 2021-08-12

## 2021-08-18 ENCOUNTER — HISTORICAL (OUTPATIENT)
Dept: LAB | Facility: HOSPITAL | Age: 66
End: 2021-08-18

## 2021-08-18 LAB
ABS NEUT (OLG): 10.07 X10(3)/MCL (ref 2.1–9.2)
ALBUMIN SERPL-MCNC: 2.6 GM/DL (ref 3.4–4.8)
ALBUMIN/GLOB SERPL: 0.7 RATIO (ref 1.1–2)
ALP SERPL-CCNC: 93 UNIT/L (ref 40–150)
ALT SERPL-CCNC: 7 UNIT/L (ref 0–55)
APPEARANCE, UA: CLEAR
AST SERPL-CCNC: 7 UNIT/L (ref 5–34)
BACTERIA SPEC CULT: ABNORMAL /HPF
BILIRUB SERPL-MCNC: 0.5 MG/DL (ref 0.2–1.2)
BILIRUB UR QL STRIP: NEGATIVE
BILIRUBIN DIRECT+TOT PNL SERPL-MCNC: 0.2 MG/DL (ref 0–0.8)
BILIRUBIN DIRECT+TOT PNL SERPL-MCNC: 0.3 MG/DL (ref 0–0.5)
BUN SERPL-MCNC: 11.4 MG/DL (ref 9.8–20.1)
CALCIUM SERPL-MCNC: 8.7 MG/DL (ref 8.4–10.2)
CHLORIDE SERPL-SCNC: 94 MMOL/L (ref 98–107)
CO2 SERPL-SCNC: 24 MMOL/L (ref 23–31)
COLOR UR: YELLOW
CREAT SERPL-MCNC: 0.91 MG/DL (ref 0.57–1.11)
ERYTHROCYTE [DISTWIDTH] IN BLOOD BY AUTOMATED COUNT: 13.9 % (ref 11.5–17)
GLOBULIN SER-MCNC: 3.9 GM/DL (ref 2.4–3.5)
GLUCOSE (UA): NEGATIVE
GLUCOSE SERPL-MCNC: 174 MG/DL (ref 82–115)
HCT VFR BLD AUTO: 35.7 % (ref 37–47)
HGB BLD-MCNC: 11.5 GM/DL (ref 12–16)
HGB UR QL STRIP: NEGATIVE
HYALINE CASTS #/AREA URNS LPF: ABNORMAL /LPF
KETONES UR QL STRIP: ABNORMAL
LEUKOCYTE ESTERASE UR QL STRIP: NEGATIVE
MCH RBC QN AUTO: 28.5 PG (ref 27–31)
MCHC RBC AUTO-ENTMCNC: 32.2 GM/DL (ref 33–36)
MCV RBC AUTO: 88.6 FL (ref 80–94)
NITRITE UR QL STRIP: NEGATIVE
NRBC BLD AUTO-RTO: 0 % (ref 0–0.2)
PH UR STRIP: 6 [PH] (ref 5–7)
PLATELET # BLD AUTO: 319 X10(3)/MCL (ref 130–400)
PMV BLD AUTO: 10.4 FL (ref 7.4–10.4)
POTASSIUM SERPL-SCNC: 3.5 MMOL/L (ref 3.5–5.1)
PROT SERPL-MCNC: 6.5 GM/DL (ref 5.8–7.6)
PROT UR QL STRIP: ABNORMAL
RBC # BLD AUTO: 4.03 X10(6)/MCL (ref 4.2–5.4)
RBC #/AREA URNS HPF: 0 /[HPF]
SARS-COV-2 RNA RESP QL NAA+PROBE: NOT DETECTED
SODIUM SERPL-SCNC: 131 MMOL/L (ref 136–145)
SP GR UR STRIP: 1.01 (ref 1–1.03)
SQUAMOUS EPITHELIAL, UA: ABNORMAL /LPF
UROBILINOGEN UR STRIP-ACNC: ABNORMAL
WBC # SPEC AUTO: 12.2 X10(3)/MCL (ref 4.5–11.5)
WBC #/AREA URNS HPF: ABNORMAL /HPF

## 2021-09-03 ENCOUNTER — PATIENT MESSAGE (OUTPATIENT)
Dept: NEUROLOGY | Facility: CLINIC | Age: 66
End: 2021-09-03

## 2021-09-10 ENCOUNTER — HISTORICAL (OUTPATIENT)
Dept: RADIOLOGY | Facility: HOSPITAL | Age: 66
End: 2021-09-10

## 2021-09-14 ENCOUNTER — OUTPATIENT CASE MANAGEMENT (OUTPATIENT)
Dept: NEUROLOGY | Facility: CLINIC | Age: 66
End: 2021-09-14

## 2021-09-29 ENCOUNTER — HISTORICAL (OUTPATIENT)
Dept: RADIOLOGY | Facility: HOSPITAL | Age: 66
End: 2021-09-29

## 2021-10-05 ENCOUNTER — HISTORICAL (OUTPATIENT)
Dept: RADIOLOGY | Facility: HOSPITAL | Age: 66
End: 2021-10-05

## 2021-10-14 ENCOUNTER — OUTPATIENT CASE MANAGEMENT (OUTPATIENT)
Dept: NEUROLOGY | Facility: CLINIC | Age: 66
End: 2021-10-14

## 2021-12-13 ENCOUNTER — OUTPATIENT CASE MANAGEMENT (OUTPATIENT)
Dept: NEUROLOGY | Facility: CLINIC | Age: 66
End: 2021-12-13
Payer: MEDICARE

## 2022-02-10 ENCOUNTER — HISTORICAL (OUTPATIENT)
Dept: ADMINISTRATIVE | Facility: HOSPITAL | Age: 67
End: 2022-02-10

## 2022-02-10 LAB
CRP SERPL HS-MCNC: 7.21 MG/L
DEPRECATED CALCIDIOL+CALCIFEROL SERPL-MC: 14.4 NG/ML (ref 30–80)
RHEUMATOID FACT SERPL-ACNC: 658 [IU]/ML

## 2022-02-11 LAB
HBV SURFACE AG SERPL QL IA: 0.19
HBV SURFACE AG SERPL QL IA: NONREACTIVE
HCV AB SERPL QL IA: 0.12
HCV AB SERPL QL IA: NONREACTIVE

## 2022-03-09 ENCOUNTER — PATIENT MESSAGE (OUTPATIENT)
Dept: NEUROLOGY | Facility: CLINIC | Age: 67
End: 2022-03-09
Payer: MEDICARE

## 2022-03-18 ENCOUNTER — TELEPHONE (OUTPATIENT)
Dept: NEUROLOGY | Facility: CLINIC | Age: 67
End: 2022-03-18
Payer: MEDICARE

## 2022-03-22 ENCOUNTER — HISTORICAL (OUTPATIENT)
Dept: ADMINISTRATIVE | Facility: HOSPITAL | Age: 67
End: 2022-03-22

## 2022-03-30 ENCOUNTER — TELEPHONE (OUTPATIENT)
Dept: NEUROLOGY | Facility: CLINIC | Age: 67
End: 2022-03-30
Payer: MEDICARE

## 2022-03-30 NOTE — TELEPHONE ENCOUNTER
"----- Message from Ziggy Crowder PsyD sent at 3/28/2022  8:48 AM CDT -----  Regarding: Medication Question  Chance Pichardo,    I received the following message from this patient's daughter. I would normally suggest they schedule an appointment with you, but the patient is very impaired and would be unable to do a virtual visit without the daughter. It seems like it makes more sense to call either daughter Elana at [phone] or Sweta can be reached at [phone]. Let me know if you have any questions or if there is anything I can do:    "Good Evening. My mother fell  at home earlier this week and was brought via ambulance to the hospital. Nothing was broken, but she was complaining of severe pain and not being able to walk and she was SEVERELY dehydrated. PT and OT were having a hard time getting her out of bed and she was sleeping most of the day. Her PCM decided to stop her morning dose of seroquel yesterday to see if it would help her be more alert.  It did, but now on day 2 she is crying, hallucinating, cursing my brother out, being combative with the nurses. She is trying to leave the hospital and will not take any meds. She is at [facility] in [place]. I do not feel comfortable with her PCM continuing to alter her dementia meds without consulting with you or Archieoop. She has done very well on the seroquel and we have not had any outbursts since she began it."    "

## 2022-03-30 NOTE — TELEPHONE ENCOUNTER
Spoke to Elana lilly, last night.   Mom fell. From what they can determine by looking at alarms, she was likely down no more than an hour. dtr-in-law found her. Went to hospital. Thought broke hip but XR were fine. Was severely dehydrated, got 6 bags of fluids.   While in hospital, tried to have her stop AM seroquel thinking it may be causing drowsiness but after one day, she was hollering and trying to leave the facility.   Ended up sending her home with HH.   On prednisone currently for RA.       She lives in small house by herself but son and daughter in law live few doors down.     They check on her multiple times of day.    I am concerned that she will continue to have problems with out more continuous oversight.   Discussed enc fluids. She generally only drinks caffeine. This will not help hydration. Discussed appropriate fluid intake.     Elana offers that she is interested in adult day programs. Not sure what is in area.   Also wonder if there are any services that may be able to help.     I will send his to [facility] .

## 2022-03-30 NOTE — TELEPHONE ENCOUNTER
----- Message from Kylee Vela NP sent at 3/30/2022  1:36 PM CDT -----  Regarding: VV time for this pt  Needs VV with me. Daughter in law, Sweta will be the point of contact as pt has dementia.

## 2022-04-04 ENCOUNTER — OFFICE VISIT (OUTPATIENT)
Dept: NEUROLOGY | Facility: CLINIC | Age: 67
End: 2022-04-04
Payer: MEDICARE

## 2022-04-04 ENCOUNTER — OUTPATIENT CASE MANAGEMENT (OUTPATIENT)
Dept: NEUROLOGY | Facility: CLINIC | Age: 67
End: 2022-04-04
Payer: MEDICARE

## 2022-04-04 DIAGNOSIS — G30.0 EARLY ONSET ALZHEIMER'S DISEASE WITH BEHAVIORAL DISTURBANCE: ICD-10-CM

## 2022-04-04 DIAGNOSIS — F02.818 EARLY ONSET ALZHEIMER'S DISEASE WITH BEHAVIORAL DISTURBANCE: ICD-10-CM

## 2022-04-04 PROCEDURE — 99215 PR OFFICE/OUTPT VISIT, EST, LEVL V, 40-54 MIN: ICD-10-PCS | Mod: 95,,, | Performed by: NURSE PRACTITIONER

## 2022-04-04 PROCEDURE — 99215 OFFICE O/P EST HI 40 MIN: CPT | Mod: 95,,, | Performed by: NURSE PRACTITIONER

## 2022-04-04 RX ORDER — DICLOFENAC POTASSIUM 50 MG/1
50 TABLET, FILM COATED ORAL 2 TIMES DAILY
COMMUNITY
End: 2022-08-18 | Stop reason: SDUPTHER

## 2022-04-04 RX ORDER — CYCLOBENZAPRINE HCL 10 MG
10 TABLET ORAL NIGHTLY
COMMUNITY
End: 2022-08-18 | Stop reason: SDUPTHER

## 2022-04-04 RX ORDER — FERROUS SULFATE 325(65) MG
325 TABLET ORAL
COMMUNITY
End: 2022-08-18

## 2022-04-04 RX ORDER — PREDNISONE 5 MG/1
5 TABLET ORAL DAILY
COMMUNITY
End: 2023-04-27 | Stop reason: SDUPTHER

## 2022-04-04 RX ORDER — DULOXETIN HYDROCHLORIDE 60 MG/1
60 CAPSULE, DELAYED RELEASE ORAL 2 TIMES DAILY
Qty: 180 CAPSULE | Refills: 3 | Status: SHIPPED | OUTPATIENT
Start: 2022-04-04 | End: 2022-08-18 | Stop reason: SDUPTHER

## 2022-04-04 RX ORDER — MEMANTINE HYDROCHLORIDE 10 MG/1
10 TABLET ORAL 2 TIMES DAILY
Qty: 180 TABLET | Refills: 3 | Status: ON HOLD | OUTPATIENT
Start: 2022-04-04 | End: 2023-01-17

## 2022-04-04 RX ORDER — DONEPEZIL HYDROCHLORIDE 10 MG/1
10 TABLET, FILM COATED ORAL DAILY
Qty: 90 TABLET | Refills: 3 | Status: ON HOLD | OUTPATIENT
Start: 2022-04-04 | End: 2023-01-17

## 2022-04-04 RX ORDER — OMEPRAZOLE 40 MG/1
40 CAPSULE, DELAYED RELEASE ORAL DAILY
COMMUNITY
End: 2022-08-18 | Stop reason: SDUPTHER

## 2022-04-04 RX ORDER — HYDROXYCHLOROQUINE SULFATE 200 MG/1
TABLET, FILM COATED ORAL 2 TIMES DAILY
COMMUNITY
End: 2022-05-16 | Stop reason: SDUPTHER

## 2022-04-04 RX ORDER — QUETIAPINE FUMARATE 25 MG/1
25 TABLET, FILM COATED ORAL 2 TIMES DAILY
Qty: 180 TABLET | Refills: 3 | Status: SHIPPED | OUTPATIENT
Start: 2022-04-04 | End: 2022-11-17 | Stop reason: SDUPTHER

## 2022-04-04 NOTE — PROGRESS NOTES
4-4-22    The patient location is: [place]  The chief complaint leading to consultation is:dementia    Visit type: audiovisual    Face to Face time with patient: 58 minutes of total time spent on the encounter, which includes face to face time and non-face to face time preparing to see the patient (eg, review of tests), Obtaining and/or reviewing separately obtained history, Documenting clinical information in the electronic or other health record, Independently interpreting results (not separately reported) and communicating results to the patient/family/caregiver, or Care coordination (not separately reported).         Each patient to whom he or she provides medical services by telemedicine is:  (1) informed of the relationship between the physician and patient and the respective role of any other health care provider with respect to management of the patient; and (2) notified that he or she may decline to receive medical services by telemedicine and may withdraw from such care at any time.    Notes:   65 yo female with probable early onset Alzheimer's dementia vs FTD. She is accompanied today by her daughter-in-law, Sweta. She was last seen by VV 6-16-21. At that time, she was cont on seroquel 25 mg qAM. Rec weaning her off BZD entirely (was taking xanax 0.5 mg PRN- did not even need daily). Continued duloxetine 60 mg BID.     I had a recent phone conversation with her daughter, Elana. To recap, she had a recent fall, was likely down about an hour before Sweta found her. She was taken to the ED.  Thought had broken hip but XR negative. Was severely dehydrated, required 6 bags of IVF per daughter. Took her off Seroquel in hospital to see if causing her drowsiness but after one day off this, she was hollering and trying to leave. Had wanted her to go to inpt rehab but ended up going home with . She was also on prednisone for RA. She remains living in a small house near her son and daughter-in-law. They check in on  her multiple times daily.     Gisele offers that she has not had any problems recently. No hospitalizations. She is surprised to hear that she indeed was recently hospitalized.  When asked about her mood, she says she is not in care home for murder so that is a plus.  Sweta says her mood is usually very good. She did have problems when PCP recently tried to take her off seroquel while in hospital. PCP thought it was making her too sedated. When they took her off this, it led to big problems.   Gisele denies drowsiness and does not think she ever naps.   Sweta does not find her drowsy. She does note that if she is left with no company, she will fall asleep and take a lot of naps if she is alone. When they are over to visit or take her out, she is engaged and never drowsy.   Sweta is very clear that they believe Seroquel is a key medication for her.     Sweta notes that her BP has been running high in the mornings. Never had high BP. Highest 174/98. Been charting this over the past couple of weeks Sweta takes her BP at various times of day. Always higher in the AM and no other time. Going to call PCP today to review.     PCP is Gabriella Patton,   She was in palliative care with [facility]. This has closed. She is now with [facility] but under the palliative division. She is also currently getting HH thru them.     This was her first major fall. She stumbled once into wall weeks before this incident.  She has RA and knees had been given her a problem. Usually RA only impairs her hands. They think her knees gave out from under her.   She has been on prednisone 5 mg for the past 6 months but was increased to 20 mg daily when hospitalized. On discharge, they put her back on 5 mg. They can tell the big dose of helped her more than the 5 mg as she has been having joint pain again. They have appt with rheum in the next weeks     Likes to snack through the day. Particularly likes Cinnamon Toast Crunch. Likes to  eat it dry. She mentions this several times during visit today.   Sweta says she has been gaining weight more recently. She likes to snack a lot. If there are sweets around, she will eat up quickly    They check on her twice daily (morning and evening). They give her meds at theses times. Sweta sets up meds and she and her  give them to her. She is not in charge of her meds at all.     She does not cook but does use microwave. They take her to grocery every week and let her pick out what sounds good to her. She has multiple [employer] in the freezer. Sweta will also take her food when she cooks.   Sweta checks every morning to see what she has eaten the day before.  There are days where she has not eaten any of her frozen meals, only snacks.   Most of the time she will tell them she is not hungry.   When they go out to eat, she has a good appetite. Menus are often overwhelming to her.      Sweta says hydration has been a big concern. She only drinks Omero green tea and coffee. Cant make her drink water. Never drank water.       Bathing -not done on the regular. Gisele says she does not go anywhere and does not get dirty. Sweta does not think she needs assist with this but will remind her several times to shower when they are going somewhere.     Dressing-seems to do ok with this. Did wear flannel shirt to lunch yesterday (warm weather). Gisele says she was comfortable. Does not put things away in dresser so items are left out. Tends to recycle thru things in her eyesight.    They have bee discussing poss needing more oversight. Just not sure what that would entail. They have searched for things for her to do in their area but cannot find anything that is memory care. Any activity would require one of them to be with her.     ROS:  General: Appetite: okay         Weight: gained a little per dtr in law  ENT:  Swallowing: no issues   PULM:  No coughing, choking or SOB  GI:  Constipation:  no  Diarrhea:no  :  Musculoskeletal:  Pain: denies (but sounds like she does have joint pain in hands and knees from RA per daughter-in-law)  Psych: sleep -good  Mood- good as long as on seroquel      LIMITED EXAM:  Awake, alert, pleasant and cooperative.   Appears to have gained weight since initial visit (from my recollection)  Snacks on dry cereal during visit.   Participates in conversation. Does not mind when daughter-in-law provides history. Will occ seem surprised by daughter-in-law's answer but does not agitate. Periodically will repeat self.   Does not recall recent big events (fall, hospitalized).  RR equal and unlabored. NAD.   Face symmetric.   EOMI.  Hearing intact to conversation.   Remains seated.                         COMPARISON:  12/20/2019 CT head     FINDINGS:  There is no restricted diffusion to suggest acute or recent infarction.  Several small to punctate foci of T2 FLAIR signal hyperintensity supratentorial white matter which are nonspecific and may be sequela of mild chronic ischemic change.  The ventricles are relatively stable in size allowing for differences in technique without hydrocephalus.  There is no abnormal parenchymal susceptibility to suggest parenchymal hemorrhage.  There is no midline shift or mass effect.  The major intracranial T2 flow voids are present.  There is trace partial fluid opacification of the left mastoid air cells.     Impression:     Several small to punctate foci of T2 FLAIR signal abnormality supratentorial white matter while nonspecific suggestive for mild degree of chronic ischemic change.     Otherwise unremarkable noncontrast MRI brain as detailed above specifically without evidence for acute infarction.        Electronically signed by: Moe Bruce DO  Date:                                            04/01/2021  Time:                                           06:42        Neuropsychological evaluation 5-14-21:     Neuro       Early onset Alzheimer's  disease with behavioral disturbance     Current Assessment & Plan       Further Diagnostics: Consideration of a PET scan in the setting of young onset dementia. However, Ms. Meraz reportedly had significant anxiety with a brain MRI, so the value of the scan vs her reaction was discussed. The potential value of diagnostic refinement for clinical trials was also reviewed. Her family decided not to pursue an additional imaging at this time.       Level of Care: Ms. Meraz will likely require a higher level of care in the future, particularly given her lack of engagement in hygiene activities and not eating. It is very encouraging that she has moved closer to her son and DIL as they can check on her regularly, but more formal support will also be beneficial.      Driving: Recommended that Ms. Meraz completely discontinue driving, which basically happened after she moved to [place] as she no longer has a car.             RECENT EKG from outside reviewed (6-3-21)- QTc 413ms (will send for scan)     Reported last weight was 125 lbs. They are now weighing her at home      IMPRESSION:   1) Major neurocognitive disorder (early onset Alzheimer's dementia vs FTD) with behavorial disturbance (controlled with quetiapine)  2) Recent fall, led to hospitalization, found to be dehydrated but no fractures  3) RA- under care of rheumatology, in prednisone 5 mg daily    PLAN:  Continue quetiapine 25 mg BID.   Continue donepezil, memantine and duloxetine without change.     Discussed importance of hydration and nutrition. Since she has a good appetite when they are out to lunch, she may not recognize that she is hungry/thirsty when she is home alone, may not always know what to do or may lack desire to eat more substantial food since it is easy and enjoyable for her to snack on sweets. Discussed with them. Suspect she needs more oversight.     She lives alone with son and daughter-in-law living close by and see her at least twice  daily. This still may not be enough oversight. I have discussed this with them. Currently, they always go over twice daily to give meds and visit. I would like for them to also give her a meal and non-caffeine hydration when they are there twice daily. Discussed.      They have looked into senior activities in [facility]. They have not found anything that is suitable for those with memory loss. They would have to accompany her. Discussed having someone be with her at least few times per week. They could take her to senior center and stay with her. They could go to lunch or get nails done, etc. They could also assure she was getting adequate hydration and food when they were with her.   Sweta verbalizes understanding.     They have started thinking that about what additional help/support will look like. I do think it is time to go ahead and put these into place.     She has a VV with Dr. Crowder in May when daughter, Elana, is in town.     I will also ask our , Chayo, to reach out about poss resources in their area.     Follow up with me 4 mos_ VV is fine.      ..  ..Kylee Vela, SUBHA, NP-C

## 2022-04-04 NOTE — PROGRESS NOTES
Social Work - Dementia Care Management:    Phoned caregiver, daughter-in-law Sweta, to update care management needs, per report from Kylee Vela of increased care needs. Left voice mail with encouragement to contact me.

## 2022-04-04 NOTE — Clinical Note
If you have not done so already, can you contact family about resources in [facility]. Living alone with dementia, family close but starting to need more assist. Adult day programs or sitters few times per week would be helpful

## 2022-04-11 ENCOUNTER — HISTORICAL (OUTPATIENT)
Dept: ADMINISTRATIVE | Facility: HOSPITAL | Age: 67
End: 2022-04-11
Payer: MEDICARE

## 2022-04-28 VITALS — BODY MASS INDEX: 21 KG/M2 | HEIGHT: 64 IN | WEIGHT: 123 LBS

## 2022-05-16 ENCOUNTER — OFFICE VISIT (OUTPATIENT)
Dept: RHEUMATOLOGY | Facility: CLINIC | Age: 67
End: 2022-05-16
Payer: MEDICARE

## 2022-05-16 VITALS
RESPIRATION RATE: 18 BRPM | WEIGHT: 152.38 LBS | BODY MASS INDEX: 26.34 KG/M2 | HEART RATE: 81 BPM | DIASTOLIC BLOOD PRESSURE: 89 MMHG | SYSTOLIC BLOOD PRESSURE: 143 MMHG | OXYGEN SATURATION: 98 % | TEMPERATURE: 97 F

## 2022-05-16 DIAGNOSIS — F02.818 EARLY ONSET ALZHEIMER'S DISEASE WITH BEHAVIORAL DISTURBANCE: ICD-10-CM

## 2022-05-16 DIAGNOSIS — F39 MOOD DISORDER: ICD-10-CM

## 2022-05-16 DIAGNOSIS — E55.9 VITAMIN D DEFICIENCY: ICD-10-CM

## 2022-05-16 DIAGNOSIS — M05.9 SEROPOSITIVE RHEUMATOID ARTHRITIS: Primary | ICD-10-CM

## 2022-05-16 DIAGNOSIS — F51.01 PRIMARY INSOMNIA: ICD-10-CM

## 2022-05-16 DIAGNOSIS — M79.7 FIBROMYALGIA: ICD-10-CM

## 2022-05-16 DIAGNOSIS — G30.0 EARLY ONSET ALZHEIMER'S DISEASE WITH BEHAVIORAL DISTURBANCE: ICD-10-CM

## 2022-05-16 PROBLEM — M06.9 RHEUMATOID ARTHRITIS: Status: ACTIVE | Noted: 2022-05-16

## 2022-05-16 PROBLEM — G47.00 INSOMNIA: Status: ACTIVE | Noted: 2022-05-16

## 2022-05-16 PROCEDURE — 99999 PR PBB SHADOW E&M-EST. PATIENT-LVL V: CPT | Mod: PBBFAC,,, | Performed by: INTERNAL MEDICINE

## 2022-05-16 PROCEDURE — 99215 OFFICE O/P EST HI 40 MIN: CPT | Mod: PBBFAC | Performed by: INTERNAL MEDICINE

## 2022-05-16 PROCEDURE — 99214 OFFICE O/P EST MOD 30 MIN: CPT | Mod: S$PBB,,, | Performed by: INTERNAL MEDICINE

## 2022-05-16 PROCEDURE — 99214 PR OFFICE/OUTPT VISIT, EST, LEVL IV, 30-39 MIN: ICD-10-PCS | Mod: S$PBB,,, | Performed by: INTERNAL MEDICINE

## 2022-05-16 PROCEDURE — 99999 PR PBB SHADOW E&M-EST. PATIENT-LVL V: ICD-10-PCS | Mod: PBBFAC,,, | Performed by: INTERNAL MEDICINE

## 2022-05-16 RX ORDER — LEFLUNOMIDE 20 MG/1
TABLET ORAL
COMMUNITY
Start: 2022-05-12 | End: 2022-05-16 | Stop reason: SDUPTHER

## 2022-05-16 RX ORDER — LEFLUNOMIDE 20 MG/1
20 TABLET ORAL DAILY
Qty: 90 TABLET | Refills: 3 | Status: SHIPPED | OUTPATIENT
Start: 2022-05-16 | End: 2022-08-18 | Stop reason: SDUPTHER

## 2022-05-16 RX ORDER — PREDNISONE 5 MG/1
5 TABLET ORAL
COMMUNITY
Start: 2022-02-10 | End: 2022-08-18 | Stop reason: SDUPTHER

## 2022-05-16 RX ORDER — DICLOFENAC SODIUM 50 MG/1
50 TABLET, DELAYED RELEASE ORAL
COMMUNITY
Start: 2022-02-10 | End: 2022-08-18 | Stop reason: SDUPTHER

## 2022-05-16 RX ORDER — HYDROXYCHLOROQUINE SULFATE 200 MG/1
200 TABLET, FILM COATED ORAL 2 TIMES DAILY
Qty: 180 TABLET | Refills: 3 | Status: SHIPPED | OUTPATIENT
Start: 2022-05-16 | End: 2022-08-18 | Stop reason: SDUPTHER

## 2022-05-16 RX ORDER — PREGABALIN 25 MG/1
25 CAPSULE ORAL 2 TIMES DAILY
Qty: 60 CAPSULE | Refills: 5 | Status: SHIPPED | OUTPATIENT
Start: 2022-05-16 | End: 2022-08-18 | Stop reason: SDUPTHER

## 2022-05-19 NOTE — PROGRESS NOTES
NEUROPSYCHOLOGY CONSULT  Referral Information  Name: Gisele Meraz  MRN: [id number]  : 1955  Age: 66 y.o.  Race: White  Gender: female  REFERRAL SOURCE: Gil Phan MD  DATE CONDUCTED: 2022  SOURCES OF INFORMATION:  The following was gathered from a clinical interview with Ms. Gisele Meraz, a separate interview with her children Elana, Dayday, and Sweta (DIL) via conference call, and review of the available medical records. Ms. Meraz expressed an understanding of the purpose of the evaluation and consented to all procedures. Total licensed billing psychologists professional time including clinical interview, test administration and interpretation of tests administered by the billing psychologist, integration of test results and other clinical data, preparing the final report, and personally reporting results to the patient   Billin - 45 minutes    NEUROPSYCHOLOGICAL EVALUATION - CONFIDENTIAL    SUMMARY/TREATMENT PLAN   Ms. Meraz is a 66 year old female with young onset dementia, FTD vs AD. She continues to reside independently with the support of family who lives a few houses away. Irritability is well managed with Seroquel. Family requested this visit to discuss care needs in the setting of a recent fall (3/2022), poor hygiene, and reduced initiation. Several recommendations are offered to assist in her care/treatment.     Diagnoses  Problem List Items Addressed This Visit        Neuro    Early onset Alzheimer's disease with behavioral disturbance    Current Assessment & Plan     Level of Care: Recommended higher level of care given her fall risk and reduced hygiene. Family was receptive to this recommendation. Messaged clinic , Chayo Rothman, to reach out to Elana for caregiving resources. Of note, Ms. Meraz tends to be very responsive to medical professionals, but not someone she perceives to be a sitter. We discussed making sure a new caregiver introduce themselves as a  "medical professional rather than sitter.     Neuropsychiatric Symptoms: Well managed with Seroquel.     Hygiene: Reviewed strategies for bathing and oral hygiene.     Follow-up: PRN                Thank you for allowing me to participate in Ms. Carlos care.  If you have any questions, please contact me at [phone].    Ziggy Crowder Psy.D., ABPP  Board Certified in Clinical Neuropsychology  Department of Neurology    5/2021 HISTORY OF PRESENT ILLNESS: Ms. Gisele Meraz is a 66 y.o., right-handed,  female with 13 years of education who was referred for a neuropsychological evaluation in the setting of progressive cognitive, behavioral, and functional decline since at least 2017. Her family initially sought evaluation in 2017 following a hospitalization for sepsis. She stayed with her son and DIL after discharge and changes in her cognition/functioning were apparent. They established care with a neurologist and underwent neuropsychological evaluation in 2018. Dr. Henry diagnosed her with mild cognitive impairment (MCI), but noted "This is probably leading to dementia of the Alzheimer type." She was recently evaluated by Dr. Phan who told the family possible Alzheimer's disease, which was the first time during her course that they had been told a specific diagnosis. She has been treated by her outside neurologist over the past several years with Namzaric in addition to different SSRIs. She was started on Cymbalta a few weeks ago, which has been helpful for mood/behavior. She is apparently prescribed Xanax for periods of emotional/behavioral decompensation that occur about once per week, but she refuses to take the medication in the moment. She scored 22/30 on a MoCA from early 2021 and was not oriented to most aspects of time.     Upon reflection, Ms. Meraz's family believe behavioral/personality changes began upwards of several years prior to the initial evaluation, possibly in her late 50's. Her " "daughter, Elana, recalled "catching her lying" early on in the disease course, with her son and DIL describing frequent confabulation. For instance, she will fixate on a story/experience from her past that they all are very confident never occurred. She would also fixate on seemingly random things, texting her children constantly through the day about the same topic. They also noted considerable behavioral and verbal disinhibition that has progressively worsened. She would constantly walk up to random people in the grocery store and talk with them. She has asked all of her neighbors to borrow money. She has made unfiltered comments, such as saying her 2 year old grandson may be rodriguez, making a "joke" to her daughter's  about him "getting enoc," commenting to her children about her marital sex life, and saying a racial slur. She also called her grandson a "thief" to his face, saying that he stole a dime collection from her, which all of their family know was not possible. This was Dayday's son and he was so upset that he didn't talk to her for a year.     Ms. Fams children have become increasingly more involved in her care over the past several years, primarily due to her emotional lability and now financial issues. They discovered several years ago that she was spending thousands of dollar per month on a Facebook game and it was not clear if she knew this was the case. Her  took over bill payment as he came home several times and the power was out or the water was off. More recently, her children have become more involved with finances as her house went into foreclosure. Her children asked her what happened and she indicated that she has never paid property taxes in her life, which is obviously not the case. Her children plan to take this as an opportunity to increase her level of care. Her house will go into active sale on 5/28 and they will move her closer to Dayday and Sweta in [place] in order " "to offer a higher level of care. They will also use this opportunity to transition her from driving as she has been resistant to stopping.     Ms. Meraz's aggression towards her  has been a very challenging aspect of her care. Her family indicated that she was initially very defensive about her cognitive/functional changes which frequently led to conflict with her children when they tried to offer feedback/intervene. Fortunately, she has been more receptive to help in the past year, but her apparent vitriol towards her  has not improved. They have been together for 20 years and he has supported her financially during that time, but she now perseverates on the idea that he is taking advantage of her financially. Her children stating they have no idea where this could be coming from, since it is clearly not the case. They also have no indication that she is being treated poorly by him in any way, describing him as a nice man who is maybe being even too passive with her at this time. Regardless, she will become very angry with him, seemingly out of the blue, leading to her calling the police and yelling at him. She is open to moving closer to her son, but has stated that she wants to move away from her  and is adamant about wanting a divorce. Her children noted that she has no concept of her inability to care for herself, especially financially, without his support.     When asked about her cognition and functioning, Ms. Meraz stated that "some days are good and some days are bad." She could not elaborate what she meant by this. She did not recall her children ever expressing concern about her cognition/functioning. She denied ever making financial errors, stating that she made the decision to sell her house since it was too big and she wanted to be closer to her son. When asked about any changes in behavior, she stated that she "can be a bitch," but that she "deserves it." Regarding cognition, " "Ms. Meraz's children described rapid forgetting. For instance, they were going to get lunch this past Saturday and she repeated the same thing 7-8 times during the car ride.    ADDITIONAL INFORMATION: Ms. Meraz's family provided more background information following the interview. Her daughter, Elana described the following: "On thr flip side of that back in the late 90s (her mid 40s) is when I started seeing the changes. She became obsessed with [employer] chat rooms,  my dad, had to file for bankruptcy, had random men in and out of the house and my sister at the time was only about 12 years old. I moved to a relatives house when I was 16 because of all of this and our disagreements. One of the most disturbing memories I have of that time is her bringing my sister and I (12ish and 17ish) with her to meet a random  she had met online at night (9 pmish) in a parking lot. The man looked homeless....she still brought him home with us and he went straight to her room.     I remember in the 80s and early 90s she was a good mom. Very engaged. Girl  leader, PTA president, etc. And then something changed and she became this other person.  Or I just started noticing it because I was growing up. I'm not sure. Her lack of being able to manage money, lying, promiscuous. I remember having an argument with her when I was 18 about her parenting and she told me she was done raising her kids....my sister was only 13. Our only saving johnson was my grandmother (her mother) that we lived with. She basically took care of all us and then she passed in 1998 and my mom definitely has never been the same since. She still hasn't put my poor grandmother's name on her crypt at the [facility] because she says it will make it too real....it's been over 20 years.     My mother's brother and cousin often comment to me how they don't know what happened to her. She was always so active, smart, and driven.  And how she has been in a " "downhill for such a long time. This has been said way before any diagnosis was ever made."     5/2021 FEEDBACK NOTES: Ms. Meraz transitioned to [place], 4 house down from her son and DIL. She expressed being comfortable with the move, but mentioned the lack of sidewalks on multiple occasions. Sweta and Dayday are happy they can check on her regularly, but are worried about some of her behaviors now that she is alone. Her  did not move with her and continues to reside in [place]. When asked how she feels about this, she indicated that she and her  didn't have a "loving" relationship. When reminded that they have been together for 20 years, she indicated that they did have some good times. Regardless, she seems restless in the home without anything to do. They were already hoping to help her reduce smoking, but it may be increasing as she seems to go outside to smoke anytime she is bored. She also indicated that smoking is the one thing that makes her happy, in addition to her dog. In fact, Elana mentioned possibly registering her dog as a support animal as he is very comforting for her. In addition to increased smoking, family continues to notice problems with hygiene and eating. She frequently says that she is not hungry, including during the feedback appointment. She told Sweta that she ate one day, but Sweta could tell this was not the case based on what was in her fridge. She does not bathe regularly, clean her dentures, or brush her teeth. Of note, she did not recall undergoing testing last week.     8/2021 INTERVAL HISTORY:   -Ms. Meraz's daughter, Elana, reached out asking to discuss strategies for managing her mother's behavior. The original appointment was scheduled with Elana, but she suggested that the examiner speak with her sister in law, Sweta, instead due to a scheduling conflict. Plus, [place]ana lives out of state whereas Sweta lives a few houses away from her.   -Ms. Meraz's " "behavior was initially very challenging, particularly in the morning. She was very argumentative and "nasty." Dr. Vela started her on Seroquel in the am, which has been very helpful. They are currently discussing prescribing her Seroquel in the evening as well.   -Ms. Fams mood has improved, but remains a significant issue. She frequently feels as though her family is "plotting" against her.   -Her car is at her house, but disabled. She recently has been asking to drive, stating that no one has ever told her she can't drive. She has talked about going to a car dealership. She now talks about driving on a daily basis.   -She also talks about wanting to move back to [place]. She has become fixated on looking at her finances, accusing her family of stealing or controlling her. This comes up so often that Sweta found it helpful to leave a bank statement at her house for her to routinely reference. However, this makes her concerned that Ms. Meraz could give her information to someone unscrupulous over the phone. She has frequently made comments about planning to call realtors.   -Ms. Meraz has continued a longstanding habit of threatening litigation. This has been the case throughout her life. For instance, she will constantly say that she just consulted with an , but this is never the case.   -They have a family text chain that Ms. Meraz frequently posts to, often very angry and aggressive comments.   -Sweta described the family dynamics, noting that it is easier for her to remain somewhat objective given her role in the family. She tries to ignore the texts, but finds that it is difficult for Ms. Meraz's children to do so, often getting sucked into an argument that goes nowhere. Sweta tends to acknowledge her statements and then redirect her. They go shopping for plants daily and it is very easy to redirect a conversation towards her plants.   -Ms. Meraz has a debit card that Sweta fills with " "money on a regular basis.   -They had a sitter come over on one occasion from the [place] [facility], but have not used this service again. Ms. Meraz did fine with the sitter, but is very verbally disinhibited, including making racial comments, and made a lot of upsetting comments on their text thread. Sweta is worried that she may make a racist comment to someone directly, but this fortunately did not happen on the one occasion.     5/2022 INTERVAL HISTORY:  -Ms. Meraz's daughter, Elana, and DIL, Sweta were present for this visit.   -Ms. Meraz had a fall in 3/2022. Per an email from Elana: "My mother fell  at home earlier this week and was brought via ambulance to the hospital. Nothing was broken, but she was complaining of severe pain and not being able to walk and she was SEVERELY dehydrated. PT and OT were having a hard time getting her out of bed and she was sleeping most of the day. Her PCM decided to stop her morning dose of seroquel yesterday to see if it would help her be more alert.  It did, but now on day 2 she is crying, hallucinating, cursing my brother out, being combative with the nurses. She is trying to leave the hospital and will not take any meds. She is at [facility] in [place]. I do not feel comfortable with her PCM continuing to alter her dementia meds without consulting with you or Knoop. She has done very well on the seroquel and we have not had any outbursts since she began it."  -Mood immediately stabilized once Seroquel was restarted. Irritability continues to be very well managed with this medication. She is periodically dysphoric, crying and commenting that she is not the same person.   -The one benefit of the hospitalization was that they were able to get her to stop smoking. She reported some cravings after discharge, but family told her the feeling would pass since she stopped smoking a while ago and she hasn't mentioned cigarettes since.   -She had 4 sessions of " "home OT and PT, but they were discontinued since she did so well in the therapies. She recently reported a fall to Sweta, but she is not sure if this actually occurred. There were no signs of injuries, plus she doesn't suspect she could stand up on her own if she fell.   -Ms. Meraz has worsening RA that seems to cause increased pain/discomfort as well as reduced engagement. In fact, family finds she initiates very few activities on a daily basis and is inconsistently interested in family activities outside of the home. She seems to want to stay in bed most of the day. When she goes somewhere, she wants to come home quickly.   -Fortunately, she does seem to eat and drink without prompting. She has 2 cups of very concentrated coffee in the morning and drinks Omero ice teas throughout the day. She doesn't drink water. She has gained about 30lbs over the past year, up from her low of 125lbs. She is primarily interested in sweet/carbohydrates and tends to fixate on them for days at a time (cookies, bread). They find that she can eat a whole loaf of bread in one day.   -Bathing/hygiene are likely the primary care issues at this time. She refuses to bathe, instead stating that she washes herself off with a rag. Interestingly, she did shower when prompted by the OT on 2 occasions without an argument. Her family does not believe she has bathed in the past 3 weeks. Oral hygiene is also clearly an issue at this point.   -Short-term memory impairment continues to progress, with family noting that her "memory loop is getting shorter." She is regularly not oriented to time, eating dinner in the morning or breakfast in the evening. She will give elaborate stories of what she did during the day, which is obviously not the case. She has trouble with decision making, such as what she wants to eat at a restaurant. No obvious changes in language.   -She remains very verbally disinhibited, especially with sexualized comments.  -She " "continues to reside independently with family checking on her in the morning and evening, primarily for medication administration. There have been no apparent safety issues when she is alone. They have cameras installed in the house. They check her garbage can to make sure she is eating. They also clean the house as she does not initiate household tasks.   -Family is wondering about need for increased support/care given the above factors, particularly hygiene, lack of initiation/drive, and limited socialization/stimulation.   -Of note, her  (who she has been  from for a year) served her divorce papers 3-4 weeks ago. There is tension between her  and the family and he has not offered any support, financial or others, since they moved her out of the house. Ms. Meraz does not seem bothered by being  from her .     DAILY FUNCTIONING:  BASIC ADLS:  Feeding: Improved appetite.   Dressing: independent  Bathing: Significant issues with hygiene.    IADLS:  Support System: Reside independently in a unit 4 houses away from her son and DIL (Sweta).   Appointment Management: dependent, family manages.   Medication Compliance: Family manages and administers twice per day.   Financial Management: dependent, children now manage all bills.   Cooking: Minimal.   Driving: No longer driving.     BRAIN HEALTH RISK FACTORS:  Hearing Loss: Denied  Sleep: No problems with sleep initiation/maintenance, especially on her current medication regimen.   Falls: Endorsed, 3/2022.     MEDICAL HISTORY: Ms. Meraz  has a past medical history of Acid reflux, Arthritis, Fibromyalgia, Pneumonia, and RA (rheumatoid arthritis).    NEUROIMAGING:  3/2021 Brain MRI: "Several small to punctate foci of T2 FLAIR signal abnormality supratentorial white matter while nonspecific suggestive for mild degree of chronic ischemic change. Otherwise unremarkable noncontrast MRI brain as detailed above specifically without " "evidence for acute infarction."    SUBSTANCE USE: Longstanding history of cigarette use. No longer smoking. She does not drink alcohol. No history of substance abuse.     CURRENT MEDICATIONS:    cyclobenzaprine (FLEXERIL) 10 MG tablet  diclofenac (CATAFLAM) 50 MG tablet  diclofenac (VOLTAREN) 50 MG EC tablet  donepeziL (ARICEPT) 10 MG tablet  DULoxetine (CYMBALTA) 60 MG capsule  ferrous sulfate (FEOSOL) 325 mg (65 mg iron) Tab tablet  hydrOXYchloroQUINE (PLAQUENIL) 200 mg tablet  leflunomide (ARAVA) 20 MG Tab  memantine (NAMENDA) 10 MG Tab  omeprazole (PRILOSEC) 40 MG capsule  predniSONE (DELTASONE) 5 MG tablet  predniSONE (DELTASONE) 5 MG tablet  pregabalin (LYRICA) 25 MG capsule  QUEtiapine (SEROQUEL) 25 MG Tab    PSYCHIATRIC HISTORY: Ms. Meraz's family described baseline personality idiosyncrasies, but not necessarily to the point of warranting a psychiatric diagnosis. She has never been evaluated or treated by psychiatry to their knowledge. Regarding baseline personality features, they indicated that she always thought people were "out to get her" or "talking behind her back." For instance, she was on the PTA board and would constantly talk about how other parents were out to get her.     SUICIDAL IDEATION:  History: Denied  Active Suicidal Ideation:Denied  Plan/Intent: Denied    FAMILY HISTORY: Father with Alzheimer's disease,  in mid-70's. Maternal grandmother with Alzheimer's disease, unclear onset.     PSYCHOSOCIAL HISTORY:   Education:   Level Attained: High school graduate. 2 years of business college.      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 She transitioned to disability in her 50's. She receives $1900 a month.     Relationship Status:   : Currently  from  ( in )     : yes,    Children: 3 (Elana, Dayday, Nataly)    "

## 2022-05-20 ENCOUNTER — OFFICE VISIT (OUTPATIENT)
Dept: NEUROLOGY | Facility: CLINIC | Age: 67
End: 2022-05-20
Payer: MEDICARE

## 2022-05-20 DIAGNOSIS — G30.0 EARLY ONSET ALZHEIMER'S DISEASE WITH BEHAVIORAL DISTURBANCE: ICD-10-CM

## 2022-05-20 DIAGNOSIS — F02.818 EARLY ONSET ALZHEIMER'S DISEASE WITH BEHAVIORAL DISTURBANCE: ICD-10-CM

## 2022-05-20 PROCEDURE — 99499 UNLISTED E&M SERVICE: CPT | Mod: 95,,, | Performed by: PSYCHIATRY & NEUROLOGY

## 2022-05-20 PROCEDURE — 90846 PR FAMILY PSYCHOTHERAPY W/O PT, 50 MIN: ICD-10-PCS | Mod: 95,,, | Performed by: PSYCHIATRY & NEUROLOGY

## 2022-05-20 PROCEDURE — 99499 NO LOS: ICD-10-PCS | Mod: 95,,, | Performed by: PSYCHIATRY & NEUROLOGY

## 2022-05-20 PROCEDURE — 90846 FAMILY PSYTX W/O PT 50 MIN: CPT | Mod: 95,,, | Performed by: PSYCHIATRY & NEUROLOGY

## 2022-05-21 RX ORDER — ADALIMUMAB 40MG/0.4ML
40 KIT SUBCUTANEOUS
Qty: 2 EACH | Refills: 11 | Status: SHIPPED | OUTPATIENT
Start: 2022-05-21 | End: 2022-08-18

## 2022-05-21 NOTE — ASSESSMENT & PLAN NOTE
Level of Care: Recommended higher level of care given her fall risk and reduced hygiene. Family was receptive to this recommendation. Messaged clinic , Chayo Rothman, to reach out to Elana for caregiving resources. Of note, Ms. Meraz tends to be very responsive to medical professionals, but not someone she perceives to be a sitter. We discussed making sure a new caregiver introduce themselves as a medical professional rather than sitter.     Neuropsychiatric Symptoms: Well managed with Seroquel.     Hygiene: Reviewed strategies for bathing and oral hygiene.     Follow-up: PRN

## 2022-05-22 NOTE — PROGRESS NOTES
Subjective:       Patient ID: Gisele Meraz is a 66 y.o. female.    Chief Complaint: Follow-up and Lumps on both hands     The [id number] is complaining of joint pain involving the MCP PIP wrist elbow shoulders hips knees and ankles bilaterally.  The pain is 8/10 in intensity dull in quality and continuous.  That is associated with a morning stiffness lasting for more than 60 minutes.  Is also having difficulty maintaining a good night of sleep.  This has been associated with myalgias.  Muscle aches are 10/10 in intensity dull in quality and continuous.  They are associated with fatigue.  No fever no chills no others.    Follow-up  Pertinent negatives include no abdominal pain, chest pain, chills, congestion, fever, rash, vomiting or weakness.     Review of Systems   Constitutional: Negative for appetite change, chills and fever.   HENT: Negative for congestion, ear pain, mouth sores, nosebleeds and trouble swallowing.    Eyes: Negative for photophobia and discharge.   Respiratory: Negative for chest tightness and shortness of breath.    Cardiovascular: Negative for chest pain.   Gastrointestinal: Negative for abdominal pain and vomiting.   Endocrine: Negative.    Genitourinary: Negative for hematuria.   Musculoskeletal:        As per HPI   Skin: Negative for rash.   Neurological: Negative for weakness.         Objective:   BP (!) 143/89   Pulse 81   Temp 97.2 °F (36.2 °C) (Temporal)   Resp 18   Wt 69.1 kg (152 lb 6.4 oz)   SpO2 98%   BMI 26.34 kg/m²      Physical Exam   Constitutional: She is oriented to person, place, and time. She appears well-developed and well-nourished. No distress.   HENT:   Head: Normocephalic and atraumatic.   Right Ear: External ear normal.   Left Ear: External ear normal.   Eyes: Pupils are equal, round, and reactive to light.   Cardiovascular: Normal rate, regular rhythm and normal heart sounds.   Pulmonary/Chest: Breath sounds normal.   Abdominal: Soft. There is no abdominal  tenderness.   Musculoskeletal:      Right shoulder: Normal.      Left shoulder: Normal.      Right elbow: Normal.      Left elbow: Normal.      Right wrist: Normal.      Left wrist: Normal.      Cervical back: Neck supple.      Right hip: Normal.      Left hip: Normal.      Right knee: Normal.      Left knee: Normal.   Lymphadenopathy:     She has no cervical adenopathy.   Neurological: She is alert and oriented to person, place, and time. She displays normal reflexes. No cranial nerve deficit or sensory deficit. She exhibits normal muscle tone. Coordination normal.   Skin: No rash noted. No erythema.   Vitals reviewed.      Right Side Rheumatological Exam     Examination finds the shoulder, elbow, wrist, knee, 1st PIP, 1st MCP, 2nd PIP, 2nd MCP, 3rd PIP, 3rd MCP, 4th PIP, 4th MCP, 5th PIP, 5th MCP, temporomandibular, hip, ankle, 1st MTP, 2nd MTP, 3rd MTP, 4th MTP and 5th MTP normal.    Left Side Rheumatological Exam     Examination finds the shoulder, elbow, wrist, knee, 1st PIP, 1st MCP, 2nd PIP, 2nd MCP, 3rd PIP, 3rd MCP, 4th PIP, 4th MCP, 5th PIP, 5th MCP, temporomandibular, hip, ankle, 1st MTP, 2nd MTP, 3rd MTP, 4th MTP and 5th MTP normal.         Completed Fibromyalgia exam 18/18 tender points.  No data to display     Assessment:       1. Seropositive rheumatoid arthritis    2. Fibromyalgia    3. Early onset Alzheimer's disease with behavioral disturbance    4. Primary insomnia    5. Mood disorder    6. Vitamin D deficiency            Plan:       Problem List Items Addressed This Visit        Neuro    Early onset Alzheimer's disease with behavioral disturbance    Relevant Medications    pregabalin (LYRICA) 25 MG capsule    hydrOXYchloroQUINE (PLAQUENIL) 200 mg tablet    leflunomide (ARAVA) 20 MG Tab    tuberculin injection 5 Units    Other Relevant Orders    X-Ray Chest PA And Lateral       Psychiatric    Mood disorder    Relevant Medications    pregabalin (LYRICA) 25 MG capsule    hydrOXYchloroQUINE  (PLAQUENIL) 200 mg tablet    leflunomide (ARAVA) 20 MG Tab    tuberculin injection 5 Units    Other Relevant Orders    X-Ray Chest PA And Lateral       Endocrine    Vitamin D deficiency    Relevant Medications    pregabalin (LYRICA) 25 MG capsule    hydrOXYchloroQUINE (PLAQUENIL) 200 mg tablet    leflunomide (ARAVA) 20 MG Tab    tuberculin injection 5 Units    Other Relevant Orders    X-Ray Chest PA And Lateral       Orthopedic    Fibromyalgia    Relevant Medications    pregabalin (LYRICA) 25 MG capsule    hydrOXYchloroQUINE (PLAQUENIL) 200 mg tablet    leflunomide (ARAVA) 20 MG Tab    tuberculin injection 5 Units    Other Relevant Orders    X-Ray Chest PA And Lateral       Other    Insomnia    Relevant Medications    pregabalin (LYRICA) 25 MG capsule    hydrOXYchloroQUINE (PLAQUENIL) 200 mg tablet    leflunomide (ARAVA) 20 MG Tab    tuberculin injection 5 Units    Other Relevant Orders    X-Ray Chest PA And Lateral      Other Visit Diagnoses     Seropositive rheumatoid arthritis    -  Primary    Relevant Medications    pregabalin (LYRICA) 25 MG capsule    hydrOXYchloroQUINE (PLAQUENIL) 200 mg tablet    leflunomide (ARAVA) 20 MG Tab    tuberculin injection 5 Units    Other Relevant Orders    X-Ray Chest PA And Lateral

## 2022-05-23 ENCOUNTER — OUTPATIENT CASE MANAGEMENT (OUTPATIENT)
Dept: NEUROLOGY | Facility: CLINIC | Age: 67
End: 2022-05-23
Payer: MEDICARE

## 2022-05-23 NOTE — PROGRESS NOTES
Social Work - Dementia Care Management:    Received referral from Dr. Ellsworth to address ly's questions about sitter services.  Sent email to dtcharu Huitron, with information on sitter businesses in pt's area, pros and cons of using a business vs hiring informally, recs for screening process.  Dtr is encouraged to contact me as needed.

## 2022-05-24 DIAGNOSIS — M05.9 SEROPOSITIVE RHEUMATOID ARTHRITIS: Primary | ICD-10-CM

## 2022-05-30 RX ORDER — ADALIMUMAB 40MG/0.8ML
40 KIT SUBCUTANEOUS
Qty: 2 PEN | Refills: 2 | Status: SHIPPED | OUTPATIENT
Start: 2022-05-30 | End: 2022-08-18

## 2022-07-29 ENCOUNTER — INFUSION (OUTPATIENT)
Dept: INFECTIOUS DISEASES | Facility: HOSPITAL | Age: 67
End: 2022-07-29
Attending: ORTHOPAEDIC SURGERY
Payer: MEDICARE

## 2022-07-29 VITALS
SYSTOLIC BLOOD PRESSURE: 123 MMHG | RESPIRATION RATE: 16 BRPM | DIASTOLIC BLOOD PRESSURE: 78 MMHG | HEIGHT: 64 IN | WEIGHT: 152 LBS | TEMPERATURE: 98 F | OXYGEN SATURATION: 100 % | HEART RATE: 84 BPM | BODY MASS INDEX: 25.95 KG/M2

## 2022-07-29 DIAGNOSIS — U07.1 COVID: Primary | ICD-10-CM

## 2022-07-29 PROCEDURE — M0222 HC IV INJECTION, BEBTELOVIMAB, INCL POST ADMIN MONIT: HCPCS

## 2022-07-29 PROCEDURE — 63600175 PHARM REV CODE 636 W HCPCS

## 2022-07-29 RX ORDER — ALBUTEROL SULFATE 90 UG/1
2 AEROSOL, METERED RESPIRATORY (INHALATION)
Status: ACTIVE | OUTPATIENT
Start: 2022-07-29 | End: 2022-08-01

## 2022-07-29 RX ORDER — EPINEPHRINE 0.3 MG/.3ML
0.3 INJECTION SUBCUTANEOUS
Status: ACTIVE | OUTPATIENT
Start: 2022-07-29 | End: 2022-08-01

## 2022-07-29 RX ORDER — ONDANSETRON 4 MG/1
4 TABLET, ORALLY DISINTEGRATING ORAL
Status: ACTIVE | OUTPATIENT
Start: 2022-07-29 | End: 2022-07-30

## 2022-07-29 RX ORDER — BEBTELOVIMAB 87.5 MG/ML
175 INJECTION, SOLUTION INTRAVENOUS
Status: COMPLETED | OUTPATIENT
Start: 2022-07-29 | End: 2022-07-29

## 2022-07-29 RX ORDER — ACETAMINOPHEN 325 MG/1
650 TABLET ORAL
Status: ACTIVE | OUTPATIENT
Start: 2022-07-29 | End: 2022-07-30

## 2022-07-29 RX ORDER — DIPHENHYDRAMINE HYDROCHLORIDE 50 MG/ML
25 INJECTION INTRAMUSCULAR; INTRAVENOUS
Status: ACTIVE | OUTPATIENT
Start: 2022-07-29 | End: 2022-07-30

## 2022-07-29 RX ADMIN — BEBTELOVIMAB 175 MG: 87.5 INJECTION, SOLUTION INTRAVENOUS at 02:07

## 2022-08-08 ENCOUNTER — PATIENT MESSAGE (OUTPATIENT)
Dept: NEUROLOGY | Facility: CLINIC | Age: 67
End: 2022-08-08

## 2022-08-08 ENCOUNTER — OFFICE VISIT (OUTPATIENT)
Dept: NEUROLOGY | Facility: CLINIC | Age: 67
End: 2022-08-08
Payer: MEDICARE

## 2022-08-08 DIAGNOSIS — F39 MOOD DISORDER: ICD-10-CM

## 2022-08-08 DIAGNOSIS — F02.818 EARLY ONSET ALZHEIMER'S DISEASE WITH BEHAVIORAL DISTURBANCE: Primary | ICD-10-CM

## 2022-08-08 DIAGNOSIS — M06.9 RHEUMATOID ARTHRITIS, INVOLVING UNSPECIFIED SITE, UNSPECIFIED WHETHER RHEUMATOID FACTOR PRESENT: ICD-10-CM

## 2022-08-08 DIAGNOSIS — G30.0 EARLY ONSET ALZHEIMER'S DISEASE WITH BEHAVIORAL DISTURBANCE: Primary | ICD-10-CM

## 2022-08-08 PROCEDURE — 99215 PR OFFICE/OUTPT VISIT, EST, LEVL V, 40-54 MIN: ICD-10-PCS | Mod: 95,,, | Performed by: NURSE PRACTITIONER

## 2022-08-08 PROCEDURE — 99215 OFFICE O/P EST HI 40 MIN: CPT | Mod: 95,,, | Performed by: NURSE PRACTITIONER

## 2022-08-08 NOTE — PROGRESS NOTES
"8-8-2022    The patient location is: [place]   The chief complaint leading to consultation is: dementia    Visit type: audiovisual (poor connection intermittently during visit)    Face to Face time with patient: 65  minutes of total time spent on the encounter, which includes face to face time and non-face to face time preparing to see the patient (eg, review of tests), Obtaining and/or reviewing separately obtained history, Documenting clinical information in the electronic or other health record, Independently interpreting results (not separately reported) and communicating results to the patient/family/caregiver, or Care coordination (not separately reported).         Each patient to whom he or she provides medical services by telemedicine is:  (1) informed of the relationship between the physician and patient and the respective role of any other health care provider with respect to management of the patient; and (2) notified that he or she may decline to receive medical services by telemedicine and may withdraw from such care at any time.    Notes:   66 yo female with probable early onset Alzheimer's dementia vs FTD. She is accompanied today by her daughter-in-law, Sweta. She was last seen by  4-4-22. At that time, she was cont on meds without change. Rec more oversight. She is accompanied today by daugher-in-law, Sweta.     "Alive and kicking. Still pissing people off so I guess I'm ok." (she says this intermittently during visit today)     Wake up every morning, play with my puppy and fix breakfast. When asked what she eats for breakfast, she says whatever she puts her hands on. Usually not hungry when she first awakes. Usually up around 7AM and not hungry for 3 hours.      Overall, she feels pretty good.    Sweta says she just had covid and UTI, getting over this. She has not had recent falls but has a little balance issue.    With exertion, no stamina. Also has a bad R knee, which is painful, in addition to " "RA.    Stamina was not good before covid and uti but these issues did not help.    Finally got her set up with [facility] on T and R to play bingo. This is not a memory care facility but they do have one other person with dementia. There is not a lot of oversight so have to be careful with this. On Wednesdays, she goes to PT.   This was all set up just before she got covid. Once she finishes quarantine, they will get this started back up again for T, W and R.     She says she made some good friends (at [facility]).   They have a van and pick her up in the AM. Sweta brings her home. Sweta does go to her every AM and helps assure she is up and ready and on the bus on days she is to go to [place].      Sweta says in the AM, it takes a little convincing to get her up and going.      Feels her mood is okay. Ms. Meraz says she has not hit anybody.  Sweta says she has her moments. She thinks she is depressed. She will actually even say it at times. She will also comment that she is bored.   Sweta also thinks she has anxiety. Prior to starting at [facility], she had various excuses as to why she did not want to go. Eventually, she said she was scared and nervous.      Ms. Meraz recognizes there are things she cannot do anymore. She feels mad that she cannot do what she used to do. She adds that she was always active and into different things.      Sweta offers that she has commented that if she "would have known my  life was going to end up like this" but never completes her thought. Sweta notes she recently made this same comment to PT. She sometimes wonders if she is more filling in the gaps with commentary.     Denies anger or aggression. Sweta agrees and says this has been better since starting Seroquel.      Appetite -" I like it, not really hugnry. Try to eat healthy." Sweta says she would not descirbe her nutrition s as healthy.   This AM she was eating cereal when Sweta arrived. She got up from " the table, left the cereal and got PB crackers. Yesterday, they went grocery shopping. Bought a 12 pack of PB crackers. Seven hours later, she had 1 pack left. Ms. Meraz says she likes them and they are nutritious.      She eats cereal for breakfast and lean cuisines or something similar for lunch and dinner. She eats snacks all day in between. She is still able to use  microwave by herself.     The [facility] is a state program. They do feed them lunch while they are there.      Sweta helps in the AM.  Son (Sweta's ) goes by in evenings. Usually by the time he gets there she is already eating her Lean Cuisine. If they have dinner, he will take to her. Some days she is picky, some days she is not   She has not problems operating tv or remote  Still locks her front door before bed.     Bathing - she can do but it can be challenging to motivate her to do so. Usually, she always takes a shower before grocery shopping on Sunday. her typical statement is that she does not do anything to get dirty.     Weight has increased, per Amamda.     Sleep- may fall asleep with TV on by her account. Sleeps some during day because she is bored (also by her account).     LIMITED EXAM:  Awake, alert, pleasant and cooperative.   Bit disinhibited.   Does seem to follow conversation.   RR equal and unlabored. NAD.   Face symmetric.   Hearing intact to conversation.   Seated.        Neuropsychological evaluation 5-14-21:     Neuro       Early onset Alzheimer's disease with behavioral disturbance     Current Assessment & Plan       Further Diagnostics: Consideration of a PET scan in the setting of young onset dementia. However, Ms. Meraz reportedly had significant anxiety with a brain MRI, so the value of the scan vs her reaction was discussed. The potential value of diagnostic refinement for clinical trials was also reviewed. Her family decided not to pursue an additional imaging at this time.       Level of Care: Ms.  Kt will likely require a higher level of care in the future, particularly given her lack of engagement in hygiene activities and not eating. It is very encouraging that she has moved closer to her son and DIL as they can check on her regularly, but more formal support will also be beneficial.      Driving: Recommended that Ms. Meraz completely discontinue driving, which basically happened after she moved to [place] as she no longer has a car.             RECENT EKG from outside reviewed (6-3-21)- QTc 413ms (will send for scan)     Reported last weight was 125 lbs. They are now weighing her at home     1) Major neurocognitive disorder (early onset Alzheimer's dementia vs FTD) with behavorial disturbance (controlled with quetiapine)  2) Recent COVID and UTI  3) RA- under care of rheumatology, in prednisone 5 mg daily  4) depression  5)anxiety        PLAN:  Continue cymbalta 60 mg BID.    Continue donepezil 10 mg daily.   Continue memantine 10 mg BID.   Continue quetiapine 25 mg BID.     Recommend more stimulation during day.     Discussed plans for future and what this will look like. This is more posed for the family overall.     Had some connection difficulties and ultimately lost connection at end of visit. Messaged them thru portal to assure no additional questions.     Follow up 6 months.     ..  ..Kylee Vela DNP, NP-C

## 2022-08-18 ENCOUNTER — OFFICE VISIT (OUTPATIENT)
Dept: RHEUMATOLOGY | Facility: CLINIC | Age: 67
End: 2022-08-18
Payer: MEDICARE

## 2022-08-18 VITALS
WEIGHT: 159.38 LBS | DIASTOLIC BLOOD PRESSURE: 68 MMHG | HEIGHT: 64 IN | HEART RATE: 80 BPM | SYSTOLIC BLOOD PRESSURE: 106 MMHG | BODY MASS INDEX: 27.21 KG/M2 | TEMPERATURE: 98 F | RESPIRATION RATE: 18 BRPM | OXYGEN SATURATION: 95 %

## 2022-08-18 DIAGNOSIS — F51.01 PRIMARY INSOMNIA: ICD-10-CM

## 2022-08-18 DIAGNOSIS — G30.0 EARLY ONSET ALZHEIMER'S DISEASE WITH BEHAVIORAL DISTURBANCE: ICD-10-CM

## 2022-08-18 DIAGNOSIS — F39 MOOD DISORDER: ICD-10-CM

## 2022-08-18 DIAGNOSIS — E55.9 VITAMIN D DEFICIENCY: ICD-10-CM

## 2022-08-18 DIAGNOSIS — F02.818 EARLY ONSET ALZHEIMER'S DISEASE WITH BEHAVIORAL DISTURBANCE: ICD-10-CM

## 2022-08-18 DIAGNOSIS — M05.9 SEROPOSITIVE RHEUMATOID ARTHRITIS: ICD-10-CM

## 2022-08-18 DIAGNOSIS — M79.7 FIBROMYALGIA: ICD-10-CM

## 2022-08-18 DIAGNOSIS — M06.9 RHEUMATOID ARTHRITIS, INVOLVING UNSPECIFIED SITE, UNSPECIFIED WHETHER RHEUMATOID FACTOR PRESENT: Primary | ICD-10-CM

## 2022-08-18 PROCEDURE — 99999 PR PBB SHADOW E&M-EST. PATIENT-LVL III: CPT | Mod: PBBFAC,,, | Performed by: INTERNAL MEDICINE

## 2022-08-18 PROCEDURE — 99999 PR PBB SHADOW E&M-EST. PATIENT-LVL III: ICD-10-PCS | Mod: PBBFAC,,, | Performed by: INTERNAL MEDICINE

## 2022-08-18 PROCEDURE — 99214 PR OFFICE/OUTPT VISIT, EST, LEVL IV, 30-39 MIN: ICD-10-PCS | Mod: S$PBB,,, | Performed by: INTERNAL MEDICINE

## 2022-08-18 PROCEDURE — 99214 OFFICE O/P EST MOD 30 MIN: CPT | Mod: S$PBB,,, | Performed by: INTERNAL MEDICINE

## 2022-08-18 PROCEDURE — 99213 OFFICE O/P EST LOW 20 MIN: CPT | Mod: PBBFAC | Performed by: INTERNAL MEDICINE

## 2022-08-18 RX ORDER — CYCLOBENZAPRINE HCL 10 MG
10 TABLET ORAL NIGHTLY
Qty: 90 TABLET | Refills: 3 | Status: ON HOLD | OUTPATIENT
Start: 2022-08-18 | End: 2023-01-18 | Stop reason: HOSPADM

## 2022-08-18 RX ORDER — LEFLUNOMIDE 20 MG/1
20 TABLET ORAL DAILY
Qty: 90 TABLET | Refills: 3 | Status: SHIPPED | OUTPATIENT
Start: 2022-08-18 | End: 2023-04-27 | Stop reason: SDUPTHER

## 2022-08-18 RX ORDER — DICLOFENAC SODIUM 50 MG/1
50 TABLET, DELAYED RELEASE ORAL 2 TIMES DAILY PRN
Qty: 180 TABLET | Refills: 3 | Status: ON HOLD | OUTPATIENT
Start: 2022-08-18 | End: 2023-01-18 | Stop reason: HOSPADM

## 2022-08-18 RX ORDER — OMEPRAZOLE 40 MG/1
40 CAPSULE, DELAYED RELEASE ORAL DAILY
Qty: 90 CAPSULE | Refills: 3 | Status: SHIPPED | OUTPATIENT
Start: 2022-08-18 | End: 2023-04-27 | Stop reason: SDUPTHER

## 2022-08-18 RX ORDER — HYDROXYCHLOROQUINE SULFATE 200 MG/1
200 TABLET, FILM COATED ORAL 2 TIMES DAILY
Qty: 180 TABLET | Refills: 3 | Status: ON HOLD | OUTPATIENT
Start: 2022-08-18 | End: 2023-01-18 | Stop reason: HOSPADM

## 2022-08-18 RX ORDER — DULOXETIN HYDROCHLORIDE 60 MG/1
60 CAPSULE, DELAYED RELEASE ORAL 2 TIMES DAILY
Qty: 180 CAPSULE | Refills: 3 | Status: SHIPPED | OUTPATIENT
Start: 2022-08-18

## 2022-08-18 RX ORDER — PREGABALIN 25 MG/1
25 CAPSULE ORAL 2 TIMES DAILY
Qty: 180 CAPSULE | Refills: 3 | Status: ON HOLD | OUTPATIENT
Start: 2022-08-18 | End: 2023-01-18 | Stop reason: HOSPADM

## 2022-08-18 NOTE — PROGRESS NOTES
"Subjective:       Patient ID: Gisele Meraz is a 67 y.o. female.    Chief Complaint: 3 month follow up (Pt doing well)    The patient is complaining of joint pain involving the MCP PIP wrist elbow shoulders hips knees and ankles bilaterally.  The pain is 3/10 in intensity dull in quality and continuous.  That is associated with a morning stiffness lasting for more than 60 minutes.  Is also having difficulty maintaining a good night of sleep.  This has been associated with myalgias.  Muscle aches are 3/10 in intensity dull in quality and continuous.  They are associated with fatigue.  No fever no chills no others.      Review of Systems   Constitutional: Negative for appetite change, chills and fever.   HENT: Negative for congestion, ear pain, mouth sores, nosebleeds and trouble swallowing.    Eyes: Negative for photophobia and discharge.   Respiratory: Negative for chest tightness and shortness of breath.    Cardiovascular: Negative for chest pain.   Gastrointestinal: Negative for abdominal pain and vomiting.   Endocrine: Negative.    Genitourinary: Negative for hematuria.   Musculoskeletal:        As per HPI   Skin: Negative for rash.   Neurological: Negative for weakness.         Objective:   /68 (BP Location: Right arm, Patient Position: Sitting, BP Method: Medium (Automatic))   Pulse 80   Temp 97.7 °F (36.5 °C) (Temporal)   Resp 18   Ht 5' 4" (1.626 m)   Wt 72.3 kg (159 lb 6.4 oz)   SpO2 95%   BMI 27.36 kg/m²      Physical Exam   Constitutional: She is oriented to person, place, and time. She appears well-developed and well-nourished. No distress.   HENT:   Head: Normocephalic and atraumatic.   Right Ear: External ear normal.   Left Ear: External ear normal.   Eyes: Pupils are equal, round, and reactive to light.   Cardiovascular: Normal rate, regular rhythm and normal heart sounds.   Pulmonary/Chest: Breath sounds normal.   Abdominal: Soft. There is no abdominal tenderness.   Musculoskeletal:      " Right shoulder: Normal.      Left shoulder: Normal.      Right elbow: Normal.      Left elbow: Normal.      Right wrist: Normal.      Left wrist: Normal.      Cervical back: Neck supple.      Right hip: Normal.      Left hip: Normal.      Right knee: Normal.      Left knee: Normal.   Lymphadenopathy:     She has no cervical adenopathy.   Neurological: She is alert and oriented to person, place, and time. She displays normal reflexes. No cranial nerve deficit or sensory deficit. She exhibits normal muscle tone. Coordination normal.   Skin: No rash noted. No erythema.   Vitals reviewed.      Right Side Rheumatological Exam     Examination finds the shoulder, elbow, wrist, knee, 1st PIP, 1st MCP, 2nd PIP, 2nd MCP, 3rd PIP, 3rd MCP, 4th PIP, 4th MCP, 5th PIP, 5th MCP, temporomandibular, hip, ankle, 1st MTP, 2nd MTP, 3rd MTP, 4th MTP and 5th MTP normal.    Left Side Rheumatological Exam     Examination finds the shoulder, elbow, wrist, knee, 1st PIP, 1st MCP, 2nd PIP, 2nd MCP, 3rd PIP, 3rd MCP, 4th PIP, 4th MCP, 5th PIP, 5th MCP, temporomandibular, hip, ankle, 1st MTP, 2nd MTP, 3rd MTP, 4th MTP and 5th MTP normal.         Completed Fibromyalgia exam 11/18 tender points.  No data to display     Assessment:       1. Rheumatoid arthritis, involving unspecified site, unspecified whether rheumatoid factor present    2. Vitamin D deficiency    3. Fibromyalgia    4. Primary insomnia    5. Early onset Alzheimer's disease with behavioral disturbance    6. Seropositive rheumatoid arthritis    7. Mood disorder            Plan:       Problem List Items Addressed This Visit        Neuro    Early onset Alzheimer's disease with behavioral disturbance    Relevant Medications    cyclobenzaprine (FLEXERIL) 10 MG tablet    diclofenac (VOLTAREN) 50 MG EC tablet    DULoxetine (CYMBALTA) 60 MG capsule    hydrOXYchloroQUINE (PLAQUENIL) 200 mg tablet    leflunomide (ARAVA) 20 MG Tab    omeprazole (PRILOSEC) 40 MG capsule    pregabalin (LYRICA)  25 MG capsule    folic acid-vit B6-vit B12 2.5-25-2 mg (FOLBIC OR EQUIV) 2.5-25-2 mg Tab       Psychiatric    Mood disorder    Relevant Medications    cyclobenzaprine (FLEXERIL) 10 MG tablet    diclofenac (VOLTAREN) 50 MG EC tablet    DULoxetine (CYMBALTA) 60 MG capsule    hydrOXYchloroQUINE (PLAQUENIL) 200 mg tablet    leflunomide (ARAVA) 20 MG Tab    omeprazole (PRILOSEC) 40 MG capsule    pregabalin (LYRICA) 25 MG capsule    folic acid-vit B6-vit B12 2.5-25-2 mg (FOLBIC OR EQUIV) 2.5-25-2 mg Tab       Immunology/Multi System    Rheumatoid arthritis - Primary    Relevant Medications    cyclobenzaprine (FLEXERIL) 10 MG tablet    diclofenac (VOLTAREN) 50 MG EC tablet    DULoxetine (CYMBALTA) 60 MG capsule    hydrOXYchloroQUINE (PLAQUENIL) 200 mg tablet    leflunomide (ARAVA) 20 MG Tab    omeprazole (PRILOSEC) 40 MG capsule    pregabalin (LYRICA) 25 MG capsule    folic acid-vit B6-vit B12 2.5-25-2 mg (FOLBIC OR EQUIV) 2.5-25-2 mg Tab       Endocrine    Vitamin D deficiency    Relevant Medications    cyclobenzaprine (FLEXERIL) 10 MG tablet    diclofenac (VOLTAREN) 50 MG EC tablet    DULoxetine (CYMBALTA) 60 MG capsule    hydrOXYchloroQUINE (PLAQUENIL) 200 mg tablet    leflunomide (ARAVA) 20 MG Tab    omeprazole (PRILOSEC) 40 MG capsule    pregabalin (LYRICA) 25 MG capsule    folic acid-vit B6-vit B12 2.5-25-2 mg (FOLBIC OR EQUIV) 2.5-25-2 mg Tab       Orthopedic    Fibromyalgia    Relevant Medications    cyclobenzaprine (FLEXERIL) 10 MG tablet    diclofenac (VOLTAREN) 50 MG EC tablet    DULoxetine (CYMBALTA) 60 MG capsule    hydrOXYchloroQUINE (PLAQUENIL) 200 mg tablet    leflunomide (ARAVA) 20 MG Tab    omeprazole (PRILOSEC) 40 MG capsule    pregabalin (LYRICA) 25 MG capsule    folic acid-vit B6-vit B12 2.5-25-2 mg (FOLBIC OR EQUIV) 2.5-25-2 mg Tab       Other    Insomnia    Relevant Medications    cyclobenzaprine (FLEXERIL) 10 MG tablet    diclofenac (VOLTAREN) 50 MG EC tablet    DULoxetine (CYMBALTA) 60 MG capsule     hydrOXYchloroQUINE (PLAQUENIL) 200 mg tablet    leflunomide (ARAVA) 20 MG Tab    omeprazole (PRILOSEC) 40 MG capsule    pregabalin (LYRICA) 25 MG capsule    folic acid-vit B6-vit B12 2.5-25-2 mg (FOLBIC OR EQUIV) 2.5-25-2 mg Tab      Other Visit Diagnoses     Seropositive rheumatoid arthritis        Relevant Medications    cyclobenzaprine (FLEXERIL) 10 MG tablet    diclofenac (VOLTAREN) 50 MG EC tablet    DULoxetine (CYMBALTA) 60 MG capsule    hydrOXYchloroQUINE (PLAQUENIL) 200 mg tablet    leflunomide (ARAVA) 20 MG Tab    omeprazole (PRILOSEC) 40 MG capsule    pregabalin (LYRICA) 25 MG capsule    folic acid-vit B6-vit B12 2.5-25-2 mg (FOLBIC OR EQUIV) 2.5-25-2 mg Tab

## 2022-08-22 ENCOUNTER — HOSPITAL ENCOUNTER (EMERGENCY)
Facility: HOSPITAL | Age: 67
Discharge: LEFT BEFORE DISCHARGE | End: 2022-08-22
Payer: MEDICARE

## 2022-08-22 VITALS
TEMPERATURE: 98 F | HEIGHT: 64 IN | BODY MASS INDEX: 27.31 KG/M2 | WEIGHT: 160 LBS | SYSTOLIC BLOOD PRESSURE: 104 MMHG | HEART RATE: 81 BPM | DIASTOLIC BLOOD PRESSURE: 74 MMHG | RESPIRATION RATE: 16 BRPM | OXYGEN SATURATION: 98 %

## 2022-08-22 PROCEDURE — 99999 HC NO LEVEL OF SERVICE - ED ONLY: CPT | Mod: 25

## 2022-10-24 LAB
HEMOCCULT SP1 STL QL: NORMAL
HEMOCCULT SP2 STL QL: NORMAL
HEMOCCULT SP3 STL QL: NORMAL

## 2022-11-14 ENCOUNTER — DOCUMENTATION ONLY (OUTPATIENT)
Dept: ADMINISTRATIVE | Facility: HOSPITAL | Age: 67
End: 2022-11-14

## 2022-11-17 ENCOUNTER — PATIENT MESSAGE (OUTPATIENT)
Dept: NEUROLOGY | Facility: CLINIC | Age: 67
End: 2022-11-17
Payer: MEDICARE

## 2022-11-17 DIAGNOSIS — F02.818 EARLY ONSET ALZHEIMER'S DISEASE WITH BEHAVIORAL DISTURBANCE: ICD-10-CM

## 2022-11-17 DIAGNOSIS — G30.0 EARLY ONSET ALZHEIMER'S DISEASE WITH BEHAVIORAL DISTURBANCE: ICD-10-CM

## 2022-11-17 RX ORDER — QUETIAPINE FUMARATE 25 MG/1
TABLET, FILM COATED ORAL
Qty: 225 TABLET | Refills: 1 | Status: ON HOLD | OUTPATIENT
Start: 2022-11-17 | End: 2023-01-18 | Stop reason: SDUPTHER

## 2023-01-03 ENCOUNTER — TELEPHONE (OUTPATIENT)
Dept: INTERNAL MEDICINE | Facility: CLINIC | Age: 68
End: 2023-01-03
Payer: MEDICARE

## 2023-01-03 DIAGNOSIS — F02.818 EARLY ONSET ALZHEIMER'S DISEASE WITH BEHAVIORAL DISTURBANCE: ICD-10-CM

## 2023-01-03 DIAGNOSIS — M06.9 RHEUMATOID ARTHRITIS, INVOLVING UNSPECIFIED SITE, UNSPECIFIED WHETHER RHEUMATOID FACTOR PRESENT: ICD-10-CM

## 2023-01-03 DIAGNOSIS — G30.0 EARLY ONSET ALZHEIMER'S DISEASE WITH BEHAVIORAL DISTURBANCE: ICD-10-CM

## 2023-01-03 DIAGNOSIS — E55.9 VITAMIN D DEFICIENCY: Primary | ICD-10-CM

## 2023-01-04 ENCOUNTER — HOSPITAL ENCOUNTER (INPATIENT)
Facility: HOSPITAL | Age: 68
LOS: 14 days | Discharge: NURSING, REHAB OR LONG-TERM CARE | DRG: 522 | End: 2023-01-18
Attending: STUDENT IN AN ORGANIZED HEALTH CARE EDUCATION/TRAINING PROGRAM | Admitting: STUDENT IN AN ORGANIZED HEALTH CARE EDUCATION/TRAINING PROGRAM
Payer: MEDICARE

## 2023-01-04 DIAGNOSIS — R42 DIZZINESS: ICD-10-CM

## 2023-01-04 DIAGNOSIS — R26.0 ATAXIC GAIT: ICD-10-CM

## 2023-01-04 DIAGNOSIS — F02.818 EARLY ONSET ALZHEIMER'S DISEASE WITH BEHAVIORAL DISTURBANCE: ICD-10-CM

## 2023-01-04 DIAGNOSIS — I10 HYPERTENSION, UNSPECIFIED TYPE: ICD-10-CM

## 2023-01-04 DIAGNOSIS — R03.0 ELEVATED BLOOD PRESSURE READING: ICD-10-CM

## 2023-01-04 DIAGNOSIS — D64.9 ANEMIA: ICD-10-CM

## 2023-01-04 DIAGNOSIS — S72.002A LEFT DISPLACED FEMORAL NECK FRACTURE: ICD-10-CM

## 2023-01-04 DIAGNOSIS — G30.0 EARLY ONSET ALZHEIMER'S DISEASE WITH BEHAVIORAL DISTURBANCE: ICD-10-CM

## 2023-01-04 DIAGNOSIS — D64.9 ANEMIA, UNSPECIFIED TYPE: ICD-10-CM

## 2023-01-04 DIAGNOSIS — I16.0 HYPERTENSIVE URGENCY: ICD-10-CM
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
## 2023-01-04 LAB
ALBUMIN SERPL-MCNC: 3.3 G/DL (ref 3.4–4.8)
ALBUMIN/GLOB SERPL: 1.3 RATIO (ref 1.1–2)
ALP SERPL-CCNC: 199 UNIT/L (ref 40–150)
ALT SERPL-CCNC: 25 UNIT/L (ref 0–55)
APPEARANCE UR: CLEAR
APTT PPP: 24.3 SECONDS (ref 23.2–33.7)
AST SERPL-CCNC: 22 UNIT/L (ref 5–34)
BACTERIA #/AREA URNS AUTO: NORMAL /HPF
BASOPHILS # BLD AUTO: 0.05 X10(3)/MCL (ref 0–0.2)
BASOPHILS NFR BLD AUTO: 0.7 %
BILIRUB UR QL STRIP.AUTO: NEGATIVE MG/DL
BILIRUBIN DIRECT+TOT PNL SERPL-MCNC: 0.5 MG/DL
BUN SERPL-MCNC: 14.3 MG/DL (ref 9.8–20.1)
CALCIUM SERPL-MCNC: 8.3 MG/DL (ref 8.4–10.2)
CHLORIDE SERPL-SCNC: 105 MMOL/L (ref 98–107)
CO2 SERPL-SCNC: 29 MMOL/L (ref 23–31)
COLOR UR AUTO: YELLOW
CREAT SERPL-MCNC: 1.13 MG/DL (ref 0.55–1.02)
EOSINOPHIL # BLD AUTO: 0.13 X10(3)/MCL (ref 0–0.9)
EOSINOPHIL NFR BLD AUTO: 1.7 %
ERYTHROCYTE [DISTWIDTH] IN BLOOD BY AUTOMATED COUNT: 12.8 % (ref 11–14.5)
GFR SERPLBLD CREATININE-BSD FMLA CKD-EPI: 53 MLS/MIN/1.73/M2
GLOBULIN SER-MCNC: 2.5 GM/DL (ref 2.4–3.5)
GLUCOSE SERPL-MCNC: 109 MG/DL (ref 82–115)
GLUCOSE UR QL STRIP.AUTO: NEGATIVE MG/DL
HCT VFR BLD AUTO: 35.3 % (ref 37–47)
HGB BLD-MCNC: 11 GM/DL (ref 12–16)
IMM GRANULOCYTES # BLD AUTO: 0.01 X10(3)/MCL (ref 0–0.04)
IMM GRANULOCYTES NFR BLD AUTO: 0.1 %
INR BLD: 0.86 (ref 0–1.3)
KETONES UR QL STRIP.AUTO: NEGATIVE MG/DL
LEUKOCYTE ESTERASE UR QL STRIP.AUTO: NEGATIVE UNIT/L
LYMPHOCYTES # BLD AUTO: 1.51 X10(3)/MCL (ref 0.6–4.6)
LYMPHOCYTES NFR BLD AUTO: 19.7 %
MCH RBC QN AUTO: 29.4 PG
MCHC RBC AUTO-ENTMCNC: 31.2 MG/DL (ref 33–36)
MCV RBC AUTO: 94.4 FL (ref 80–94)
MONOCYTES # BLD AUTO: 0.59 X10(3)/MCL (ref 0.1–1.3)
MONOCYTES NFR BLD AUTO: 7.7 %
NEUTROPHILS # BLD AUTO: 5.37 X10(3)/MCL (ref 2.1–9.2)
NEUTROPHILS NFR BLD AUTO: 70.1 %
NITRITE UR QL STRIP.AUTO: NEGATIVE
NRBC BLD AUTO-RTO: 0 % (ref 0–1)
PH UR STRIP.AUTO: 6 [PH]
PLATELET # BLD AUTO: 227 X10(3)/MCL (ref 140–371)
PMV BLD AUTO: 11.5 FL (ref 9.4–12.4)
POTASSIUM SERPL-SCNC: 3.6 MMOL/L (ref 3.5–5.1)
PROT SERPL-MCNC: 5.8 GM/DL (ref 5.8–7.6)
PROT UR QL STRIP.AUTO: NEGATIVE MG/DL
PROTHROMBIN TIME: 11.7 SECONDS (ref 12.5–14.5)
RBC # BLD AUTO: 3.74 X10(6)/MCL (ref 4.2–5.4)
RBC #/AREA URNS AUTO: <5 /HPF
RBC UR QL AUTO: NEGATIVE UNIT/L
SODIUM SERPL-SCNC: 142 MMOL/L (ref 136–145)
SP GR UR STRIP.AUTO: 1.01 (ref 1–1.03)
SQUAMOUS #/AREA URNS AUTO: <5 /HPF
TROPONIN I SERPL-MCNC: <0.01 NG/ML (ref 0–0.04)
UROBILINOGEN UR STRIP-ACNC: 0.2 MG/DL
WBC # SPEC AUTO: 7.7 X10(3)/MCL (ref 4.5–11.5)
WBC #/AREA URNS AUTO: <5 /HPF

## 2023-01-04 PROCEDURE — 25000003 PHARM REV CODE 250: Performed by: STUDENT IN AN ORGANIZED HEALTH CARE EDUCATION/TRAINING PROGRAM

## 2023-01-04 PROCEDURE — 85610 PROTHROMBIN TIME: CPT | Performed by: STUDENT IN AN ORGANIZED HEALTH CARE EDUCATION/TRAINING PROGRAM

## 2023-01-04 PROCEDURE — 11000001 HC ACUTE MED/SURG PRIVATE ROOM

## 2023-01-04 PROCEDURE — 80053 COMPREHEN METABOLIC PANEL: CPT | Performed by: PHYSICIAN ASSISTANT

## 2023-01-04 PROCEDURE — 81001 URINALYSIS AUTO W/SCOPE: CPT | Performed by: STUDENT IN AN ORGANIZED HEALTH CARE EDUCATION/TRAINING PROGRAM

## 2023-01-04 PROCEDURE — 63600175 PHARM REV CODE 636 W HCPCS: Performed by: STUDENT IN AN ORGANIZED HEALTH CARE EDUCATION/TRAINING PROGRAM

## 2023-01-04 PROCEDURE — 85025 COMPLETE CBC W/AUTO DIFF WBC: CPT | Performed by: PHYSICIAN ASSISTANT

## 2023-01-04 PROCEDURE — 93005 ELECTROCARDIOGRAM TRACING: CPT

## 2023-01-04 PROCEDURE — 93010 ELECTROCARDIOGRAM REPORT: CPT | Mod: ,,, | Performed by: INTERNAL MEDICINE

## 2023-01-04 PROCEDURE — 85730 THROMBOPLASTIN TIME PARTIAL: CPT | Performed by: STUDENT IN AN ORGANIZED HEALTH CARE EDUCATION/TRAINING PROGRAM

## 2023-01-04 PROCEDURE — 25000003 PHARM REV CODE 250: Performed by: INTERNAL MEDICINE

## 2023-01-04 PROCEDURE — 93010 EKG 12-LEAD: ICD-10-PCS | Mod: ,,, | Performed by: INTERNAL MEDICINE

## 2023-01-04 PROCEDURE — 84484 ASSAY OF TROPONIN QUANT: CPT | Performed by: PHYSICIAN ASSISTANT

## 2023-01-04 PROCEDURE — 99285 EMERGENCY DEPT VISIT HI MDM: CPT | Mod: 25

## 2023-01-04 RX ORDER — SODIUM CHLORIDE 0.9 % (FLUSH) 0.9 %
10 SYRINGE (ML) INJECTION
Status: DISCONTINUED | OUTPATIENT
Start: 2023-01-04 | End: 2023-01-18 | Stop reason: HOSPADM

## 2023-01-04 RX ORDER — QUETIAPINE FUMARATE 25 MG/1
25 TABLET, FILM COATED ORAL 2 TIMES DAILY
Status: DISCONTINUED | OUTPATIENT
Start: 2023-01-04 | End: 2023-01-18 | Stop reason: HOSPADM

## 2023-01-04 RX ORDER — ASPIRIN 325 MG
325 TABLET, DELAYED RELEASE (ENTERIC COATED) ORAL
Status: COMPLETED | OUTPATIENT
Start: 2023-01-04 | End: 2023-01-04

## 2023-01-04 RX ORDER — AMLODIPINE BESYLATE 5 MG/1
5 TABLET ORAL
Status: COMPLETED | OUTPATIENT
Start: 2023-01-04 | End: 2023-01-04

## 2023-01-04 RX ORDER — ASPIRIN 325 MG
325 TABLET, DELAYED RELEASE (ENTERIC COATED) ORAL
Status: ACTIVE | OUTPATIENT
Start: 2023-01-04 | End: 2023-01-05

## 2023-01-04 RX ORDER — ACETAMINOPHEN 325 MG/1
650 TABLET ORAL EVERY 8 HOURS PRN
Status: DISCONTINUED | OUTPATIENT
Start: 2023-01-04 | End: 2023-01-18 | Stop reason: HOSPADM

## 2023-01-04 RX ORDER — MEMANTINE HYDROCHLORIDE 5 MG/1
10 TABLET ORAL 2 TIMES DAILY
Status: DISCONTINUED | OUTPATIENT
Start: 2023-01-04 | End: 2023-01-18 | Stop reason: HOSPADM

## 2023-01-04 RX ORDER — LORAZEPAM 1 MG/1
1 TABLET ORAL
Status: COMPLETED | OUTPATIENT
Start: 2023-01-04 | End: 2023-01-04

## 2023-01-04 RX ORDER — MECLIZINE HYDROCHLORIDE 25 MG/1
25 TABLET ORAL
Status: COMPLETED | OUTPATIENT
Start: 2023-01-04 | End: 2023-01-04

## 2023-01-04 RX ORDER — TALC
6 POWDER (GRAM) TOPICAL NIGHTLY PRN
Status: DISCONTINUED | OUTPATIENT
Start: 2023-01-04 | End: 2023-01-18 | Stop reason: HOSPADM

## 2023-01-04 RX ORDER — ONDANSETRON 4 MG/1
8 TABLET, ORALLY DISINTEGRATING ORAL EVERY 8 HOURS PRN
Status: DISCONTINUED | OUTPATIENT
Start: 2023-01-04 | End: 2023-01-18 | Stop reason: HOSPADM

## 2023-01-04 RX ORDER — HYDRALAZINE HYDROCHLORIDE 20 MG/ML
5 INJECTION INTRAMUSCULAR; INTRAVENOUS EVERY 6 HOURS PRN
Status: DISCONTINUED | OUTPATIENT
Start: 2023-01-04 | End: 2023-01-18 | Stop reason: HOSPADM

## 2023-01-04 RX ADMIN — MEMANTINE 10 MG: 5 TABLET ORAL at 11:01

## 2023-01-04 RX ADMIN — AMLODIPINE BESYLATE 5 MG: 5 TABLET ORAL at 09:01

## 2023-01-04 RX ADMIN — MECLIZINE HYDROCHLORIDE 25 MG: 25 TABLET ORAL at 02:01

## 2023-01-04 RX ADMIN — HYDRALAZINE HYDROCHLORIDE 5 MG: 20 INJECTION INTRAMUSCULAR; INTRAVENOUS at 10:01

## 2023-01-04 RX ADMIN — LORAZEPAM 1 MG: 1 TABLET ORAL at 04:01

## 2023-01-04 RX ADMIN — QUETIAPINE FUMARATE 25 MG: 25 TABLET ORAL at 11:01

## 2023-01-04 RX ADMIN — ASPIRIN 325 MG: 325 TABLET, COATED ORAL at 01:01

## 2023-01-04 NOTE — FIRST PROVIDER EVALUATION
"Medical screening examination initiated.  I have conducted a focused provider triage encounter, findings are as follows:    Chief Complaint   Patient presents with    Hypertension     C/o dizziness and hypertension onset last night, last BP was 200/95 this morning. Able to ambulate with steady gait.     Brief history of present illness: 67 y.o. female presents to the ED with intermittent episodes of HTN onset last night with dizziness this morning. Normotensive in triage. Denies chest pain or SOB.     Vitals:    01/04/23 0928   BP: 124/81   BP Location: Left arm   Patient Position: Sitting   Pulse: 85   Resp: 18   Temp: 97.5 °F (36.4 °C)   TempSrc: Oral   SpO2: 97%   Weight: 74.8 kg (165 lb)   Height: 5' 4" (1.626 m)       Pertinent physical exam:  Awake, alert, ambulatory, non-labored respirations    Brief workup plan:  labs, EKG, CT head    Preliminary workup initiated; this workup will be continued and followed by the physician or advanced practice provider that is assigned to the patient when roomed.  "

## 2023-01-04 NOTE — Clinical Note
Diagnosis: Hypertension, unspecified type [[id number]]   Admitting Provider:: ELEANOR JJ [[id number]]   Future Attending Provider: ELEANOR JJ [[id number]]   Reason for IP Medical Treatment  (Clinical interventions that can only be accomplished in the IP setting? ) :: Dizziness, HTN, ataxic gait   Estimated Length of Stay:: 2 midnights   I certify that Inpatient services for greater than or equal to 2 midnights are medically necessary:: Yes   Plans for Post-Acute care--if anticipated (pick the single best option):: A. No post acute care anticipated at this time

## 2023-01-04 NOTE — ED PROVIDER NOTES
Encounter Date: 2023    SCRIBE #1 NOTE: I, Anna Terri, am scribing for, and in the presence of,  Abebe Paul MD. I have scribed the following portions of the note - the EKG reading. Other sections scribed: HPI, ROS, PE, MDM.     History     Chief Complaint   Patient presents with    Hypertension     C/o dizziness and hypertension onset last night, last BP was 200/95 this morning. Able to ambulate with steady gait.     67 year old female with a hx of dementia presents to the ED for hypertension today. Pt's daughter-in-law reports the patient began feeling dizzy last night; patient reports she feels like she is intoxicated. Reports BP yesterday was elevated. She had a dip stick which should some leukocyte esterase, and started patient on Bactrim.  Daughter-in-law reports she rechecked her BP today and it was 201/96 and she was advised to come here. She reports the patient does not have a history of hypertension. She also reports the patient could not walk straight yesterday or today because of the dizziness and states she is walking to the right into walls.     The history is provided by the patient and a relative. No  was used.   Hypertension   This is a new problem. The current episode started yesterday. The problem has been unchanged. Associated symptoms include dizziness. Pertinent negatives include no chest pain, no confusion and no shortness of breath. There are no associated agents to hypertension. There are no known risk factors.   Review of patient's allergies indicates:   Allergen Reactions    Penicillin Hives    Penicillins Swelling     Past Medical History:   Diagnosis Date    Acid reflux     Arthritis     Dementia     Patient on Memantine BID    Fibromyalgia     Pneumonia     RA (rheumatoid arthritis)      Past Surgical History:   Procedure Laterality Date    ADENOIDECTOMY       SECTION      HYSTERECTOMY      TONSILLECTOMY       Family History   Problem Relation Age  of Onset    Dementia Father     Dementia Maternal Grandmother     Dementia Maternal Grandfather      Social History     Tobacco Use    Smoking status: Former     Types: Cigarettes    Smokeless tobacco: Never   Substance Use Topics    Alcohol use: Not Currently    Drug use: Never     Review of Systems   Constitutional:  Negative for fever.        Hypertensive.   HENT:  Negative for sore throat.    Eyes:  Negative for visual disturbance.   Respiratory:  Negative for shortness of breath.    Cardiovascular:  Negative for chest pain.   Gastrointestinal:  Negative for abdominal pain.   Genitourinary:  Negative for dysuria.   Musculoskeletal:  Negative for joint swelling.   Skin:  Negative for rash.   Neurological:  Positive for dizziness. Negative for weakness.        Ataxic   Psychiatric/Behavioral:  Negative for confusion.      Physical Exam     Initial Vitals [01/04/23 0928]   BP Pulse Resp Temp SpO2   124/81 85 18 97.5 °F (36.4 °C) 97 %      MAP       --         Physical Exam    Nursing note and vitals reviewed.  Constitutional: She appears well-developed and well-nourished. She is not diaphoretic. No distress.       HENT:   Head: Normocephalic and atraumatic.   Eyes: EOM are normal. Pupils are equal, round, and reactive to light.   Neck:   Normal range of motion.  Cardiovascular:  Normal rate, regular rhythm, normal heart sounds and intact distal pulses.           No murmur heard.  Pulmonary/Chest: Breath sounds normal. No respiratory distress. She has no wheezes. She has no rales.   Abdominal: Abdomen is soft. She exhibits no distension. There is no abdominal tenderness. There is no rebound.   Musculoskeletal:         General: No tenderness or edema.      Cervical back: Normal range of motion.     Neurological: She is alert and oriented to person, place, and time. She has normal reflexes. She displays normal reflexes. No cranial nerve deficit or sensory deficit. GCS eye subscore is 4. GCS verbal subscore is 4. GCS  motor subscore is 6.   Ataxic gait; leans rightward, able to make 3-4 steps before falling to the R   Skin: Skin is warm and dry. Capillary refill takes less than 2 seconds. No rash noted. No erythema.   Psychiatric: She has a normal mood and affect.       ED Course   Procedures  Labs Reviewed   COMPREHENSIVE METABOLIC PANEL - Abnormal; Notable for the following components:       Result Value    Creatinine 1.13 (*)     Calcium Level Total 8.3 (*)     Albumin Level 3.3 (*)     Alkaline Phosphatase 199 (*)     All other components within normal limits   CBC WITH DIFFERENTIAL - Abnormal; Notable for the following components:    RBC 3.74 (*)     Hgb 11.0 (*)     Hct 35.3 (*)     MCV 94.4 (*)     MCHC 31.2 (*)     All other components within normal limits   PROTIME-INR - Abnormal; Notable for the following components:    PT 11.7 (*)     All other components within normal limits   TROPONIN I - Normal   APTT - Normal   URINALYSIS, REFLEX TO URINE CULTURE - Normal   URINALYSIS, MICROSCOPIC - Normal   CBC W/ AUTO DIFFERENTIAL    Narrative:     The following orders were created for panel order CBC auto differential.  Procedure                               Abnormality         Status                     ---------                               -----------         ------                     CBC with Differential[[id number]]        Abnormal            Final result                 Please view results for these tests on the individual orders.     EKG Readings: (Independently Interpreted)   Initial Reading: No STEMI. Rhythm: Normal Sinus Rhythm. Heart Rate: 73. Ectopy: No Ectopy. Conduction: Normal. ST Segments: Normal ST Segments. T Waves: Normal. Axis: Normal. Clinical Impression: Normal Sinus Rhythm   EKG performed at 1402.    ECG Results              EKG 12-lead (Final result)  Result time 01/04/23 14:32:34      Final result by Interface, Lab In [facility] (01/04/23 14:32:34)                   Narrative:    Test Reason :  R42,    Vent. Rate : 073 BPM     Atrial Rate : 073 BPM     P-R Int : 154 ms          QRS Dur : 082 ms      QT Int : 406 ms       P-R-T Axes : 052 -05 036 degrees     QTc Int : 447 ms    Normal sinus rhythm  Normal ECG  No previous ECGs available  Confirmed by Rishi Perez MD ([id number]) on 1/4/2023 2:32:21 PM    Referred By: [email or web address]   SELF           Confirmed By:Rishi Perez MD                                  Imaging Results              X-Ray Chest AP Portable (Final result)  Result time 01/04/23 11:39:03      Final result by Marcus Stovall MD (01/04/23 11:39:03)                   Impression:      No acute chest disease is identified.      Electronically signed by: Marcus Stovall  Date:    01/04/2023  Time:    11:39               Narrative:    EXAMINATION:  XR CHEST AP PORTABLE    CLINICAL HISTORY:  hypertension;, .    COMPARISON:  January 10, 2022    FINDINGS:  No alveolar consolidation, effusion, or pneumothorax is seen.   The thoracic aorta is normal  cardiac silhouette, central pulmonary vessels and mediastinum are normal in size and are grossly unremarkable.   visualized osseous structures are grossly unremarkable.                                       CT Head Without Contrast (Final result)  Result time 01/04/23 10:07:16      Final result by Jaime Tovar MD (01/04/23 10:07:16)                   Impression:      No acute intracranial findings.      Electronically signed by: Jaime Tovar  Date:    01/04/2023  Time:    10:07               Narrative:    EXAMINATION:  CT HEAD WITHOUT CONTRAST    CLINICAL HISTORY:  Dizziness, persistent/recurrent, cardiac or vascular cause suspected;    TECHNIQUE:  CT imaging of the head performed from the skull base to the vertex without intravenous contrast.  mGycm. Automatic exposure control, adjustment of mA/kV or iterative reconstruction technique was used to reduce radiation.    COMPARISON:  22 August 2022    FINDINGS:  There is no acute cortical infarct,  hemorrhage or mass lesion.  There is similar patchy hypoattenuation in the cerebral white matter.  There is no new parenchymal attenuation abnormality.  Ventricular size is stable.    Visualized paranasal sinuses and mastoid air cells are clear.                                    X-Rays:   Independently Interpreted Readings:   Chest X-Ray: No acute findings.  No infiltrate, widened mediastinum, or PTX   Medications   aspirin EC tablet 325 mg (has no administration in time range)   aspirin EC tablet 325 mg (325 mg Oral Given 1/4/23 1345)   meclizine tablet 25 mg (25 mg Oral Given 1/4/23 1445)   LORazepam tablet 1 mg (1 mg Oral Given 1/4/23 1630)     Medical Decision Making:   History:   I obtained history from: someone other than patient.       <> Summary of History: Patient's daughter-in-law reports the patient has a history of dementia. She reports the patient began feeling dizzy last night; patient reports she feels like she is intoxicated.   Daughter-in-law reports she rechecked her BP today and it was 201/96 and she was advised to come here. She reports the patient does not have a history of hypertension. She also reports the patient could not walk straight yesterday or today because of the dizziness.   Old Medical Records: I decided to obtain old medical records.  Old Records Summarized: records from previous admission(s).       <> Summary of Records: Reviewed previous telephone encounters with her PCP.   Initial Assessment:   Hypertensive, not on medications previously.   Differential Diagnosis:   Hypertensive urgency, hypertensive emergency, hypertensive encephalopathy, intracerebral hemorrhage, acute heart failure, myocardial ischemia, pulmonary edema, nephropathy, renal failure, proteinuria, electrolyte abnormalities, TIA, CVA    Asymptomatic elevated blood pressure, chronic hypertension, medication non-compliance    Independently Interpreted Test(s):   I have ordered and independently interpreted X-rays -  see prior notes.  I have ordered and independently interpreted EKG Reading(s) - see prior notes  Clinical Tests:   Lab Tests: Ordered and Reviewed  The following lab test(s) were unremarkable: Troponin, PTT, PT, B-HCG, CMP, CBC, Urinalysis and UPT  Radiological Study: Ordered and Reviewed  Medical Tests: Ordered and Reviewed  ED Management:  Discussed with Dr. Patel, will admit.     Patient is a 66 y/o female presents for dizziness, HTN, new onset.  See HPI/exam.  Not candidate for TPA/Tnkase duration, no suspicion for LVO.  CT as noted.  MRI obtained due to unable to walk, and rightward drift/ataxic gait.  MRI negative.  BP controlled.  Attempted to ambulate patient without success.  Discussed with medicine for admission.  All results discussed with pt/and family.  Answered all questions at this time.  Verbalized understanding and agreed to plan.   MDM  Co-morbidities and/or factors adding to the complexity or risk for the patient?: Dementia  Differential diagnoses: as noted above  Decision to obtain previous or outside records?: yes  Chart Review (nursing home, outside records, CareEverywhere): Yes  Review of RX medications/new RX prescribed by me?: Yes  My EKG Interpretation: see above  Labs/imaging/other tests obtained/considered (risk/benefits of testing discussed): Yes  Labs/tests intepretation: No evidence of end organ damage  My independent imaging interpretation: as noted above, no acute findings  Treatment/interventions, IV fluids, IV medications: yes, given aspirin  Complex management (IV controlled substances, went to OR, DNR, meds requiring monitoring, transfer, etc)?: Hypertension medications  Workup/treatment affected by social determinants of health?: Yes, chronic multiple comorbid conditions.   Consults/radiologist/EMS/social work/family discussion/alternate history: Obtained from daugther  Advanced care planning/end of life discussion: Yes  Shared decision making: Yes, due to dizziness, elevated  BP, will obtain MRI to r/o posterior cerebellar CVA  ETOH/smoking/drug cessation discussion: None  Dispo: Unable to ambulate, will admit for pt/ot eval, further workup, BP control.     Additional MDM:     NIH Stroke Scale:   Level of consciousness = 0 - alert  LOC questions = 0 - answers both correctly  LOC commands = 0 - performs both correctly  Best gaze = 0 - normal  Visual = 0 - no visual loss  Facial palsy = 0 - normal  Motor left arm =  0 - no drift  Motor right arm =  0 - no drift  Motor left leg = 0 - no drift  Motor right leg =  0 - no drift  Limb ataxia = 0 - absent  Sensory = 0 - normal  Best language = 1 - mild to moderate aphasia  Dysarthria = 1 - mild to moderate dysarthria  Extinction and inattention = 0 - no neglect  NIH Stroke Scale Total = 2     Scribe Attestation:   Scribe #1: I performed the above scribed service and the documentation accurately describes the services I performed. I attest to the accuracy of the note.    Attending Attestation:           Physician Attestation for Scribe:  Physician Attestation Statement for Scribe #1: I, Abebe Paul MD, reviewed documentation, as scribed by Anna Murray in my presence, and it is both accurate and complete.           ED Course as of 01/04/23 2201 Wed Jan 04, 2023 2200 Attempted to ambulate the patient, patient with marked rightward deviation, requiring immediate assistance to keep from falling.  [RP]      ED Course User Index  [RP] Abebe Paul MD                 Clinical Impression:   Final diagnoses:  [R42] Dizziness  [I10] Hypertension, unspecified type (Primary)  [R03.0] Elevated blood pressure reading  [I16.0] Hypertensive urgency               Abebe Paul MD  01/06/23 8639

## 2023-01-04 NOTE — TELEPHONE ENCOUNTER
Patient was instructed to go to ED due to uncontrolled blood pressure. BP of 200/100 this morning

## 2023-01-05 ENCOUNTER — PATIENT MESSAGE (OUTPATIENT)
Dept: NEUROLOGY | Facility: CLINIC | Age: 68
End: 2023-01-05
Payer: MEDICARE

## 2023-01-05 PROBLEM — I10 HYPERTENSION: Status: ACTIVE | Noted: 2023-01-05

## 2023-01-05 PROBLEM — R26.0 ATAXIC GAIT: Status: ACTIVE | Noted: 2023-01-05

## 2023-01-05 LAB
ALBUMIN SERPL-MCNC: 3.5 G/DL (ref 3.4–4.8)
ALBUMIN/GLOB SERPL: 1.3 RATIO (ref 1.1–2)
ALP SERPL-CCNC: 201 UNIT/L (ref 40–150)
ALT SERPL-CCNC: 26 UNIT/L (ref 0–55)
AST SERPL-CCNC: 23 UNIT/L (ref 5–34)
BASOPHILS # BLD AUTO: 0.05 X10(3)/MCL (ref 0–0.2)
BASOPHILS NFR BLD AUTO: 0.5 %
BILIRUBIN DIRECT+TOT PNL SERPL-MCNC: 0.5 MG/DL
BUN SERPL-MCNC: 15.5 MG/DL (ref 9.8–20.1)
CALCIUM SERPL-MCNC: 9 MG/DL (ref 8.4–10.2)
CHLORIDE SERPL-SCNC: 108 MMOL/L (ref 98–107)
CO2 SERPL-SCNC: 27 MMOL/L (ref 23–31)
CREAT SERPL-MCNC: 1.01 MG/DL (ref 0.55–1.02)
EOSINOPHIL # BLD AUTO: 0.14 X10(3)/MCL (ref 0–0.9)
EOSINOPHIL NFR BLD AUTO: 1.4 %
ERYTHROCYTE [DISTWIDTH] IN BLOOD BY AUTOMATED COUNT: 12.9 % (ref 11–14.5)
GFR SERPLBLD CREATININE-BSD FMLA CKD-EPI: 61 MLS/MIN/1.73/M2
GLOBULIN SER-MCNC: 2.6 GM/DL (ref 2.4–3.5)
GLUCOSE SERPL-MCNC: 90 MG/DL (ref 82–115)
HCT VFR BLD AUTO: 37.1 % (ref 37–47)
HGB BLD-MCNC: 11.5 GM/DL (ref 12–16)
IMM GRANULOCYTES # BLD AUTO: 0.05 X10(3)/MCL (ref 0–0.04)
IMM GRANULOCYTES NFR BLD AUTO: 0.5 %
LYMPHOCYTES # BLD AUTO: 2.41 X10(3)/MCL (ref 0.6–4.6)
LYMPHOCYTES NFR BLD AUTO: 23.9 %
MCH RBC QN AUTO: 29.3 PG
MCHC RBC AUTO-ENTMCNC: 31 MG/DL (ref 33–36)
MCV RBC AUTO: 94.6 FL (ref 80–94)
MONOCYTES # BLD AUTO: 0.72 X10(3)/MCL (ref 0.1–1.3)
MONOCYTES NFR BLD AUTO: 7.1 %
NEUTROPHILS # BLD AUTO: 6.73 X10(3)/MCL (ref 2.1–9.2)
NEUTROPHILS NFR BLD AUTO: 66.6 %
NRBC BLD AUTO-RTO: 0 % (ref 0–1)
PLATELET # BLD AUTO: 246 X10(3)/MCL (ref 140–371)
PMV BLD AUTO: 11.3 FL (ref 9.4–12.4)
POTASSIUM SERPL-SCNC: 4.4 MMOL/L (ref 3.5–5.1)
PROT SERPL-MCNC: 6.1 GM/DL (ref 5.8–7.6)
RBC # BLD AUTO: 3.92 X10(6)/MCL (ref 4.2–5.4)
SODIUM SERPL-SCNC: 146 MMOL/L (ref 136–145)
WBC # SPEC AUTO: 10.1 X10(3)/MCL (ref 4.5–11.5)

## 2023-01-05 PROCEDURE — 94761 N-INVAS EAR/PLS OXIMETRY MLT: CPT

## 2023-01-05 PROCEDURE — 99223 1ST HOSP IP/OBS HIGH 75: CPT | Mod: AI,,, | Performed by: STUDENT IN AN ORGANIZED HEALTH CARE EDUCATION/TRAINING PROGRAM

## 2023-01-05 PROCEDURE — 63600175 PHARM REV CODE 636 W HCPCS: Performed by: INTERNAL MEDICINE

## 2023-01-05 PROCEDURE — 85025 COMPLETE CBC W/AUTO DIFF WBC: CPT | Performed by: STUDENT IN AN ORGANIZED HEALTH CARE EDUCATION/TRAINING PROGRAM

## 2023-01-05 PROCEDURE — 25000003 PHARM REV CODE 250: Performed by: STUDENT IN AN ORGANIZED HEALTH CARE EDUCATION/TRAINING PROGRAM

## 2023-01-05 PROCEDURE — 80053 COMPREHEN METABOLIC PANEL: CPT | Performed by: STUDENT IN AN ORGANIZED HEALTH CARE EDUCATION/TRAINING PROGRAM

## 2023-01-05 PROCEDURE — 21400001 HC TELEMETRY ROOM

## 2023-01-05 PROCEDURE — 25000003 PHARM REV CODE 250: Performed by: INTERNAL MEDICINE

## 2023-01-05 PROCEDURE — 99223 PR INITIAL HOSPITAL CARE,LEVL III: ICD-10-PCS | Mod: AI,,, | Performed by: STUDENT IN AN ORGANIZED HEALTH CARE EDUCATION/TRAINING PROGRAM

## 2023-01-05 PROCEDURE — 11000001 HC ACUTE MED/SURG PRIVATE ROOM

## 2023-01-05 PROCEDURE — 27000221 HC OXYGEN, UP TO 24 HOURS

## 2023-01-05 RX ORDER — MORPHINE SULFATE 4 MG/ML
4 INJECTION, SOLUTION INTRAMUSCULAR; INTRAVENOUS
Status: COMPLETED | OUTPATIENT
Start: 2023-01-05 | End: 2023-01-05

## 2023-01-05 RX ORDER — DONEPEZIL HYDROCHLORIDE 5 MG/1
10 TABLET, FILM COATED ORAL DAILY
Status: DISCONTINUED | OUTPATIENT
Start: 2023-01-06 | End: 2023-01-18 | Stop reason: HOSPADM

## 2023-01-05 RX ORDER — ONDANSETRON 2 MG/ML
4 INJECTION INTRAMUSCULAR; INTRAVENOUS
Status: COMPLETED | OUTPATIENT
Start: 2023-01-05 | End: 2023-01-05

## 2023-01-05 RX ORDER — PREDNISONE 5 MG/1
5 TABLET ORAL DAILY
Status: DISCONTINUED | OUTPATIENT
Start: 2023-01-06 | End: 2023-01-18 | Stop reason: HOSPADM

## 2023-01-05 RX ORDER — DULOXETIN HYDROCHLORIDE 30 MG/1
60 CAPSULE, DELAYED RELEASE ORAL 2 TIMES DAILY
Status: DISCONTINUED | OUTPATIENT
Start: 2023-01-05 | End: 2023-01-18 | Stop reason: HOSPADM

## 2023-01-05 RX ORDER — LEFLUNOMIDE 20 MG/1
20 TABLET ORAL DAILY
Status: DISCONTINUED | OUTPATIENT
Start: 2023-01-06 | End: 2023-01-18 | Stop reason: HOSPADM

## 2023-01-05 RX ORDER — AMLODIPINE BESYLATE 5 MG/1
5 TABLET ORAL DAILY
Status: DISCONTINUED | OUTPATIENT
Start: 2023-01-06 | End: 2023-01-07

## 2023-01-05 RX ORDER — PANTOPRAZOLE SODIUM 40 MG/1
40 TABLET, DELAYED RELEASE ORAL DAILY
Status: DISCONTINUED | OUTPATIENT
Start: 2023-01-06 | End: 2023-01-18 | Stop reason: HOSPADM

## 2023-01-05 RX ADMIN — DULOXETINE 60 MG: 30 CAPSULE, DELAYED RELEASE ORAL at 09:01

## 2023-01-05 RX ADMIN — MEMANTINE 10 MG: 5 TABLET ORAL at 08:01

## 2023-01-05 RX ADMIN — MORPHINE SULFATE 4 MG: 4 INJECTION INTRAVENOUS at 05:01

## 2023-01-05 RX ADMIN — ONDANSETRON 4 MG: 2 INJECTION INTRAMUSCULAR; INTRAVENOUS at 05:01

## 2023-01-05 RX ADMIN — ACETAMINOPHEN 650 MG: 325 TABLET, FILM COATED ORAL at 03:01

## 2023-01-05 RX ADMIN — ACETAMINOPHEN 650 MG: 325 TABLET, FILM COATED ORAL at 05:01

## 2023-01-05 RX ADMIN — QUETIAPINE FUMARATE 25 MG: 25 TABLET ORAL at 08:01

## 2023-01-05 NOTE — PT/OT/SLP PROGRESS
Physical Therapy      Patient Name:  Gisele Meraz   MRN:  [id number]    PT orders received. Waiting for further recs. PT to f/u 1/6

## 2023-01-05 NOTE — NURSING
Nurses Note -- 4 Eyes      1/5/2023   10:34 AM      Skin assessed during: Admit      [x] No Pressure Injuries Present    [x]Prevention Measures Documented      [] Yes- Altered Skin Integrity Present or Discovered   [] LDA Added if Not in Epic (Describe Wound)   [] New Altered Skin Integrity was Present on Admit and Documented in LDA   [] Wound Image Taken    Wound Care Consulted? No    Attending Nurse:  Farzana De Santiago RN     Second RN/Staff Member:  Mickey Dunn

## 2023-01-05 NOTE — PT/OT/SLP PROGRESS
Occupational Therapy      Patient Name:  Gisele Meraz   MRN:  [id number]      Order received, awaiting more information in chart.  OT to f/u 1/6.      1/5/2023

## 2023-01-05 NOTE — PLAN OF CARE
01/05/23 0933   Discharge Planning   Resource/Environmental Concerns none   Support Systems Children   Equipment Currently Used at Home walker, rolling   Current Living Arrangements home   Care Facility Name Pt current with Palliative [facility]   Patient/Family Anticipates Transition to home with family   DME Needed Upon Discharge  none   Discharge Plan A Other     Dgter in law at bedside Sweta Sexton cell# [phone] who states pt resides at home down the street from her and pt's son Dayday Sexton ph# [phone] and one of them goes in the am to ready pt for the day and one will go at night to ready for evening and bed.  She states that pt is current with [facility] .  She has a RW but does not use- and before this episode of dizziness was physically strong and could manage herself at home betwn times the family would assist.Her PCP is Tripp and Pharmacy is [facility] for short term meds and [facility] for maintanence meds mail order [employer].  Fmnafisa aare that placement may be needed or a different arrangement for pt at home should these med issues continue.  Informed CM on every unit and can assist with d/c recs per MD and fmnafisa requests.

## 2023-01-06 PROBLEM — M25.552 LEFT HIP PAIN: Status: ACTIVE | Noted: 2023-01-06

## 2023-01-06 LAB
ANION GAP SERPL CALC-SCNC: 7 MEQ/L
BASOPHILS # BLD AUTO: 0.02 X10(3)/MCL (ref 0–0.2)
BASOPHILS NFR BLD AUTO: 0.3 %
BUN SERPL-MCNC: 14.2 MG/DL (ref 9.8–20.1)
CALCIUM SERPL-MCNC: 8.2 MG/DL (ref 8.4–10.2)
CHLORIDE SERPL-SCNC: 106 MMOL/L (ref 98–107)
CO2 SERPL-SCNC: 26 MMOL/L (ref 23–31)
CREAT SERPL-MCNC: 0.92 MG/DL (ref 0.55–1.02)
CREAT/UREA NIT SERPL: 15
EOSINOPHIL # BLD AUTO: 0.11 X10(3)/MCL (ref 0–0.9)
EOSINOPHIL NFR BLD AUTO: 1.5 %
ERYTHROCYTE [DISTWIDTH] IN BLOOD BY AUTOMATED COUNT: 13.1 % (ref 11–14.5)
GFR SERPLBLD CREATININE-BSD FMLA CKD-EPI: 68 MLS/MIN/1.73/M2
GLUCOSE SERPL-MCNC: 117 MG/DL (ref 82–115)
HCT VFR BLD AUTO: 33.2 % (ref 37–47)
HGB BLD-MCNC: 10.7 GM/DL (ref 12–16)
IMM GRANULOCYTES # BLD AUTO: 0.02 X10(3)/MCL (ref 0–0.04)
IMM GRANULOCYTES NFR BLD AUTO: 0.3 %
LYMPHOCYTES # BLD AUTO: 1.07 X10(3)/MCL (ref 0.6–4.6)
LYMPHOCYTES NFR BLD AUTO: 14.9 %
MCH RBC QN AUTO: 29.6 PG
MCHC RBC AUTO-ENTMCNC: 32.2 MG/DL (ref 33–36)
MCV RBC AUTO: 92 FL (ref 80–94)
MONOCYTES # BLD AUTO: 0.43 X10(3)/MCL (ref 0.1–1.3)
MONOCYTES NFR BLD AUTO: 6 %
NEUTROPHILS # BLD AUTO: 5.55 X10(3)/MCL (ref 2.1–9.2)
NEUTROPHILS NFR BLD AUTO: 77 %
NRBC BLD AUTO-RTO: 0 % (ref 0–1)
PLATELET # BLD AUTO: 179 X10(3)/MCL (ref 140–371)
PMV BLD AUTO: 11.3 FL (ref 9.4–12.4)
POTASSIUM SERPL-SCNC: 3.8 MMOL/L (ref 3.5–5.1)
RBC # BLD AUTO: 3.61 X10(6)/MCL (ref 4.2–5.4)
SODIUM SERPL-SCNC: 139 MMOL/L (ref 136–145)
WBC # SPEC AUTO: 7.2 X10(3)/MCL (ref 4.5–11.5)

## 2023-01-06 PROCEDURE — 63600175 PHARM REV CODE 636 W HCPCS: Performed by: STUDENT IN AN ORGANIZED HEALTH CARE EDUCATION/TRAINING PROGRAM

## 2023-01-06 PROCEDURE — 97162 PT EVAL MOD COMPLEX 30 MIN: CPT

## 2023-01-06 PROCEDURE — 27000221 HC OXYGEN, UP TO 24 HOURS

## 2023-01-06 PROCEDURE — 25000003 PHARM REV CODE 250: Performed by: STUDENT IN AN ORGANIZED HEALTH CARE EDUCATION/TRAINING PROGRAM

## 2023-01-06 PROCEDURE — 36415 COLL VENOUS BLD VENIPUNCTURE: CPT | Performed by: STUDENT IN AN ORGANIZED HEALTH CARE EDUCATION/TRAINING PROGRAM

## 2023-01-06 PROCEDURE — 99223 PR INITIAL HOSPITAL CARE,LEVL III: ICD-10-PCS | Mod: ,,, | Performed by: ORTHOPAEDIC SURGERY

## 2023-01-06 PROCEDURE — 85025 COMPLETE CBC W/AUTO DIFF WBC: CPT | Performed by: STUDENT IN AN ORGANIZED HEALTH CARE EDUCATION/TRAINING PROGRAM

## 2023-01-06 PROCEDURE — 25000003 PHARM REV CODE 250: Performed by: INTERNAL MEDICINE

## 2023-01-06 PROCEDURE — 99223 1ST HOSP IP/OBS HIGH 75: CPT | Mod: ,,, | Performed by: ORTHOPAEDIC SURGERY

## 2023-01-06 PROCEDURE — 97166 OT EVAL MOD COMPLEX 45 MIN: CPT

## 2023-01-06 PROCEDURE — 21400001 HC TELEMETRY ROOM

## 2023-01-06 PROCEDURE — 94761 N-INVAS EAR/PLS OXIMETRY MLT: CPT

## 2023-01-06 PROCEDURE — 99233 SBSQ HOSP IP/OBS HIGH 50: CPT | Mod: ,,, | Performed by: STUDENT IN AN ORGANIZED HEALTH CARE EDUCATION/TRAINING PROGRAM

## 2023-01-06 PROCEDURE — 99233 PR SUBSEQUENT HOSPITAL CARE,LEVL III: ICD-10-PCS | Mod: ,,, | Performed by: STUDENT IN AN ORGANIZED HEALTH CARE EDUCATION/TRAINING PROGRAM

## 2023-01-06 PROCEDURE — 80048 BASIC METABOLIC PNL TOTAL CA: CPT | Performed by: STUDENT IN AN ORGANIZED HEALTH CARE EDUCATION/TRAINING PROGRAM

## 2023-01-06 RX ORDER — HYDROCODONE BITARTRATE AND ACETAMINOPHEN 5; 325 MG/1; MG/1
1 TABLET ORAL EVERY 6 HOURS PRN
Status: DISCONTINUED | OUTPATIENT
Start: 2023-01-06 | End: 2023-01-07

## 2023-01-06 RX ORDER — MORPHINE SULFATE 4 MG/ML
2 INJECTION, SOLUTION INTRAMUSCULAR; INTRAVENOUS EVERY 4 HOURS PRN
Status: DISCONTINUED | OUTPATIENT
Start: 2023-01-06 | End: 2023-01-18 | Stop reason: HOSPADM

## 2023-01-06 RX ADMIN — PREDNISONE 5 MG: 5 TABLET ORAL at 09:01

## 2023-01-06 RX ADMIN — DULOXETINE 60 MG: 30 CAPSULE, DELAYED RELEASE ORAL at 07:01

## 2023-01-06 RX ADMIN — QUETIAPINE FUMARATE 25 MG: 25 TABLET ORAL at 08:01

## 2023-01-06 RX ADMIN — PANTOPRAZOLE SODIUM 40 MG: 40 TABLET, DELAYED RELEASE ORAL at 09:01

## 2023-01-06 RX ADMIN — DONEPEZIL HYDROCHLORIDE 10 MG: 5 TABLET, FILM COATED ORAL at 09:01

## 2023-01-06 RX ADMIN — ACETAMINOPHEN 650 MG: 325 TABLET, FILM COATED ORAL at 09:01

## 2023-01-06 RX ADMIN — MEMANTINE 10 MG: 5 TABLET ORAL at 08:01

## 2023-01-06 RX ADMIN — QUETIAPINE FUMARATE 25 MG: 25 TABLET ORAL at 07:01

## 2023-01-06 RX ADMIN — ACETAMINOPHEN 650 MG: 325 TABLET, FILM COATED ORAL at 02:01

## 2023-01-06 RX ADMIN — AMLODIPINE BESYLATE 5 MG: 5 TABLET ORAL at 09:01

## 2023-01-06 RX ADMIN — DULOXETINE 60 MG: 30 CAPSULE, DELAYED RELEASE ORAL at 08:01

## 2023-01-06 RX ADMIN — HYDROCODONE BITARTRATE AND ACETAMINOPHEN 1 TABLET: 5; 325 TABLET ORAL at 07:01

## 2023-01-06 RX ADMIN — MEMANTINE 10 MG: 5 TABLET ORAL at 07:01

## 2023-01-06 RX ADMIN — MORPHINE SULFATE 2 MG: 4 INJECTION INTRAVENOUS at 12:01

## 2023-01-06 NOTE — H&P
[facility] - 3rd [facility] Medicine  History & Physical    Patient Name: Gisele Meraz  MRN: [id number]  Patient Class: IP- Inpatient  Admission Date: 1/4/2023  Attending Physician: Cindy Almaguer MD  Primary Care Provider: Cindy Almaguer MD         Patient information was obtained from relative(s) and ER records.     Subjective:     Principal Problem:<principal problem not specified>    Chief Complaint:   Chief Complaint   Patient presents with    Hypertension     C/o dizziness and hypertension onset last night, last BP was 200/95 this morning. Able to ambulate with steady gait.        HPI: Ms Jaquez is a 66 y/o female patient with past medical history significant for Alzheimer disease, fibromyalgia, rheumatoid arthritis who presented to ED with complaint of elevated blood pressure. Most of history is obtained from daughter in law who is primary caregiver. She states patient started feeling dizzy Tuesday night, they called office and labs were done which showed elevated BUN and creatinine, urine dipstick was obtained at home which was positive for leukocyte esterase. Wednesday morning blood pressure was checked at home and was 200/91, they called office and were instructed to go to ED. Patient denies history of hypertension and is not taking any medications. In ED blood pressure responded to amlodipine and IV hydralazine. Per daughter in law patient was going to be discharged however she was noted to sway towards the right side when walking. CT and MRI head were negative for ischemic stroke. Blood pressure remains elevated but improved. UA was obtained in ED which was negative. Today patient denies any complaints. Physical therapy has been consulted and will work with her later today.       Past Medical History:   Diagnosis Date    Acid reflux     Arthritis     Dementia     Patient on Memantine BID    Fibromyalgia     Pneumonia     RA (rheumatoid  arthritis)        Past Surgical History:   Procedure Laterality Date    ADENOIDECTOMY       SECTION      HYSTERECTOMY      TONSILLECTOMY         Review of patient's allergies indicates:   Allergen Reactions    Penicillin Hives    Penicillins Swelling       No current facility-administered medications on file prior to encounter.     Current Outpatient Medications on File Prior to Encounter   Medication Sig    cyclobenzaprine (FLEXERIL) 10 MG tablet Take 1 tablet (10 mg total) by mouth nightly.    diclofenac (VOLTAREN) 50 MG EC tablet Take 1 tablet (50 mg total) by mouth 2 (two) times daily as needed (pain).    donepeziL (ARICEPT) 10 MG tablet Take 1 tablet (10 mg total) by mouth once daily.    DULoxetine (CYMBALTA) 60 MG capsule Take 1 capsule (60 mg total) by mouth 2 (two) times daily.    hydrOXYchloroQUINE (PLAQUENIL) 200 mg tablet Take 1 tablet (200 mg total) by mouth 2 (two) times daily.    leflunomide (ARAVA) 20 MG Tab Take 1 tablet (20 mg total) by mouth once daily.    memantine (NAMENDA) 10 MG Tab Take 1 tablet (10 mg total) by mouth 2 (two) times daily.    omeprazole (PRILOSEC) 40 MG capsule Take 1 capsule (40 mg total) by mouth once daily.    predniSONE (DELTASONE) 5 MG tablet Take 5 mg by mouth once daily.    pregabalin (LYRICA) 25 MG capsule Take 1 capsule (25 mg total) by mouth 2 (two) times daily.    QUEtiapine (SEROQUEL) 25 MG Tab Take one tablet twice daily. Okay to take an extra half to one tablet daily if needed for increased anxiety, irritability or insomnia.     Family History       Problem Relation (Age of Onset)    Dementia Father, Maternal Grandmother, Maternal Grandfather          Tobacco Use    Smoking status: Former     Types: Cigarettes    Smokeless tobacco: Never   Substance and Sexual Activity    Alcohol use: Not Currently    Drug use: Never    Sexual activity: Not on file     Review of Systems   Constitutional:  Negative for chills, fatigue and fever.   HENT:   Negative for congestion, rhinorrhea and sore throat.    Respiratory:  Negative for cough, chest tightness and shortness of breath.    Cardiovascular:  Negative for chest pain, palpitations and leg swelling.   Gastrointestinal:  Negative for abdominal distention, abdominal pain, constipation, diarrhea, nausea and vomiting.   Genitourinary:  Negative for dysuria.   Musculoskeletal:  Negative for arthralgias, back pain and joint swelling.   Neurological:  Negative for dizziness and headaches.   Psychiatric/Behavioral:  Negative for agitation and confusion.    Objective:     Vital Signs (Most Recent):  Temp: 98.6 °F (37 °C) (01/05/23 1722)  Pulse: 82 (01/05/23 1604)  Resp: 18 (01/05/23 1312)  BP: 124/78 (01/05/23 1604)  SpO2: 98 % (01/05/23 1604) Vital Signs (24h Range):  Temp:  [97.4 °F (36.3 °C)-98.6 °F (37 °C)] 98.6 °F (37 °C)  Pulse:  [69-87] 82  Resp:  [12-18] 18  SpO2:  [93 %-99 %] 98 %  BP: (124-222)/() 124/78     Weight: 74.8 kg (165 lb)  Body mass index is 28.32 kg/m².    Physical Exam  Constitutional:       General: She is not in acute distress.     Appearance: Normal appearance.   HENT:      Head: Normocephalic and atraumatic.   Eyes:      Extraocular Movements: Extraocular movements intact.      Pupils: Pupils are equal, round, and reactive to light.   Cardiovascular:      Rate and Rhythm: Normal rate and regular rhythm.      Pulses: Normal pulses.      Heart sounds: Normal heart sounds. No murmur heard.    No friction rub. No gallop.   Pulmonary:      Effort: Pulmonary effort is normal.      Breath sounds: Normal breath sounds. No wheezing, rhonchi or rales.   Abdominal:      General: Abdomen is flat. Bowel sounds are normal. There is no distension.      Palpations: Abdomen is soft.      Tenderness: There is no abdominal tenderness.   Musculoskeletal:         General: No swelling or tenderness. Normal range of motion.      Cervical back: Normal range of motion and neck supple. No tenderness.       Right lower leg: No edema.      Left lower leg: No edema.   Lymphadenopathy:      Cervical: No cervical adenopathy.   Skin:     Findings: No lesion or rash.   Neurological:      General: No focal deficit present.      Mental Status: She is alert and oriented to person, place, and time.      Cranial Nerves: No cranial nerve deficit.      Sensory: No sensory deficit.      Motor: No weakness.   Psychiatric:         Mood and Affect: Mood normal.         Behavior: Behavior normal.         Thought Content: Thought content normal.         CRANIAL NERVES     CN III, IV, VI   Pupils are equal, round, and reactive to light.     Significant Labs: All pertinent labs within the past 24 hours have been reviewed.    Significant Imaging: I have reviewed all pertinent imaging results/findings within the past 24 hours.    Assessment/Plan:     Ataxic gait  Unclear etiology  CT and MRI head negative  Now that blood pressure is better controlled, will evaluate for any improvement   PT consulted, will follow up on recommendations       Hypertension  No PMH of HTN, not taking any medications  Will start amlodipine 5 mg QD since blood pressure remains above goal  Continue hydralazine PRN for BP > 180/100      Rheumatoid arthritis  Continue home medications       Fibromyalgia  Continue home medications       Early onset Alzheimer's disease with behavioral disturbance  Continue home medications         VTE Risk Mitigation (From admission, onward)         Ordered     IP VTE HIGH RISK PATIENT  Once         01/04/23 2155     Place sequential compression device  Until discontinued         01/04/23 2155                   Cindy Almaguer MD  Department of Hospital Medicine   [facility] - 3rd Floor Medical Telemetry

## 2023-01-06 NOTE — ASSESSMENT & PLAN NOTE
No PMH of HTN, not taking any medications  Blood pressure has been uncontrolled this morning, likely due to pain  Conitnue amlodipine 5 mg QD since blood pressure remains above goal  Continue hydralazine PRN for BP > 180/100

## 2023-01-06 NOTE — ASSESSMENT & PLAN NOTE
Unclear etiology  CT and MRI head negative  Now that blood pressure is better controlled, will evaluate for any improvement   PT consulted, unable to work with them due to pain  Started morphine 2 mg Q 4 h PRN and Norco 5 mg Q 6 h PRN  Will need to work with PT to evaluate disposition at discharge

## 2023-01-06 NOTE — HPI
Ms Jaquez is a 66 y/o female patient with past medical history significant for Alzheimer disease, fibromyalgia, rheumatoid arthritis who presented to ED with complaint of elevated blood pressure. Most of history is obtained from daughter in law who is primary caregiver. She states patient started feeling dizzy Tuesday night, they called office and labs were done which showed elevated BUN and creatinine, urine dipstick was obtained at home which was positive for leukocyte esterase. Wednesday morning blood pressure was checked at home and was 200/91, they called office and were instructed to go to ED. Patient denies history of hypertension and is not taking any medications. In ED blood pressure responded to amlodipine and IV hydralazine. Per daughter in law patient was going to be discharged however she was noted to sway towards the right side when walking. CT and MRI head were negative for ischemic stroke. Blood pressure remains elevated but improved. UA was obtained in ED which was negative.

## 2023-01-06 NOTE — PROGRESS NOTES
[facility] - 3rd [facility] Medicine  Progress Note    Patient Name: Gisele Meraz  MRN: [id number]  Patient Class: IP- Inpatient   Admission Date: 1/4/2023  Length of Stay: 2 days  Attending Physician: Cindy Smith MD  Primary Care Provider: Cindy Almaguer MD        Subjective:     Principal Problem:<principal problem not specified>        HPI:  Ms Jaquez is a 66 y/o female patient with past medical history significant for Alzheimer disease, fibromyalgia, rheumatoid arthritis who presented to ED with complaint of elevated blood pressure. Most of history is obtained from daughter in law who is primary caregiver. She states patient started feeling dizzy Tuesday night, they called office and labs were done which showed elevated BUN and creatinine, urine dipstick was obtained at home which was positive for leukocyte esterase. Wednesday morning blood pressure was checked at home and was 200/91, they called office and were instructed to go to ED. Patient denies history of hypertension and is not taking any medications. In ED blood pressure responded to amlodipine and IV hydralazine. Per daughter in law patient was going to be discharged however she was noted to sway towards the right side when walking. CT and MRI head were negative for ischemic stroke. Blood pressure remains elevated but improved. UA was obtained in ED which was negative. Today patient denies any complaints. Physical therapy has been consulted and will work with her later today.       Overview/Hospital Course:  No notes on file    No new subjective & objective note has been filed under this hospital service since the last note was generated.      Assessment/Plan:      Left hip pain  Likely a result of fall   Will obtain X ray of L hip  Pain control as below       Ataxic gait  Unclear etiology  CT and MRI head negative  Now that blood pressure is better controlled, will evaluate for any improvement    PT consulted, unable to work with them due to pain  Started morphine 2 mg Q 4 h PRN and Norco 5 mg Q 6 h PRN  Will need to work with PT to evaluate disposition at discharge       Hypertension  No PMH of HTN, not taking any medications  Blood pressure has been uncontrolled this morning, likely due to pain  Conitnue amlodipine 5 mg QD since blood pressure remains above goal  Continue hydralazine PRN for BP > 180/100      Rheumatoid arthritis  Continue home medications       Fibromyalgia  Continue home medications       Early onset Alzheimer's disease with behavioral disturbance  Continue home medications       VTE Risk Mitigation (From admission, onward)         Ordered     IP VTE HIGH RISK PATIENT  Once         01/04/23 2155     Place sequential compression device  Until discontinued         01/04/23 2155                Discharge Planning   DANIEL:      Code Status: Full Code   Is the patient medically ready for discharge?:     Reason for patient still in hospital (select all that apply): Treatment  Discharge Plan A: Other                  Cindy Almaguer MD  Department of Hospital Medicine   [facility] - 3rd Floor Medical Telemetry

## 2023-01-06 NOTE — SUBJECTIVE & OBJECTIVE
Past Medical History:   Diagnosis Date    Acid reflux     Arthritis     Dementia     Patient on Memantine BID    Fibromyalgia     Pneumonia     RA (rheumatoid arthritis)        Past Surgical History:   Procedure Laterality Date    ADENOIDECTOMY       SECTION      HYSTERECTOMY      TONSILLECTOMY         Review of patient's allergies indicates:   Allergen Reactions    Penicillin Hives    Penicillins Swelling       No current facility-administered medications on file prior to encounter.     Current Outpatient Medications on File Prior to Encounter   Medication Sig    cyclobenzaprine (FLEXERIL) 10 MG tablet Take 1 tablet (10 mg total) by mouth nightly.    diclofenac (VOLTAREN) 50 MG EC tablet Take 1 tablet (50 mg total) by mouth 2 (two) times daily as needed (pain).    donepeziL (ARICEPT) 10 MG tablet Take 1 tablet (10 mg total) by mouth once daily.    DULoxetine (CYMBALTA) 60 MG capsule Take 1 capsule (60 mg total) by mouth 2 (two) times daily.    hydrOXYchloroQUINE (PLAQUENIL) 200 mg tablet Take 1 tablet (200 mg total) by mouth 2 (two) times daily.    leflunomide (ARAVA) 20 MG Tab Take 1 tablet (20 mg total) by mouth once daily.    memantine (NAMENDA) 10 MG Tab Take 1 tablet (10 mg total) by mouth 2 (two) times daily.    omeprazole (PRILOSEC) 40 MG capsule Take 1 capsule (40 mg total) by mouth once daily.    predniSONE (DELTASONE) 5 MG tablet Take 5 mg by mouth once daily.    pregabalin (LYRICA) 25 MG capsule Take 1 capsule (25 mg total) by mouth 2 (two) times daily.    QUEtiapine (SEROQUEL) 25 MG Tab Take one tablet twice daily. Okay to take an extra half to one tablet daily if needed for increased anxiety, irritability or insomnia.     Family History       Problem Relation (Age of Onset)    Dementia Father, Maternal Grandmother, Maternal Grandfather          Tobacco Use    Smoking status: Former     Types: Cigarettes    Smokeless tobacco: Never   Substance and Sexual Activity    Alcohol use: Not Currently     Drug use: Never    Sexual activity: Not on file     Review of Systems   Constitutional:  Negative for chills, fatigue and fever.   HENT:  Negative for congestion, rhinorrhea and sore throat.    Respiratory:  Negative for cough, chest tightness and shortness of breath.    Cardiovascular:  Negative for chest pain, palpitations and leg swelling.   Gastrointestinal:  Negative for abdominal distention, abdominal pain, constipation, diarrhea, nausea and vomiting.   Genitourinary:  Negative for dysuria.   Musculoskeletal:  Negative for arthralgias, back pain and joint swelling.   Neurological:  Negative for dizziness and headaches.   Psychiatric/Behavioral:  Negative for agitation and confusion.    Objective:     Vital Signs (Most Recent):  Temp: 98.6 °F (37 °C) (01/05/23 1722)  Pulse: 82 (01/05/23 1604)  Resp: 18 (01/05/23 1312)  BP: 124/78 (01/05/23 1604)  SpO2: 98 % (01/05/23 1604) Vital Signs (24h Range):  Temp:  [97.4 °F (36.3 °C)-98.6 °F (37 °C)] 98.6 °F (37 °C)  Pulse:  [69-87] 82  Resp:  [12-18] 18  SpO2:  [93 %-99 %] 98 %  BP: (124-222)/() 124/78     Weight: 74.8 kg (165 lb)  Body mass index is 28.32 kg/m².    Physical Exam  Constitutional:       General: She is not in acute distress.     Appearance: Normal appearance.   HENT:      Head: Normocephalic and atraumatic.   Eyes:      Extraocular Movements: Extraocular movements intact.      Pupils: Pupils are equal, round, and reactive to light.   Cardiovascular:      Rate and Rhythm: Normal rate and regular rhythm.      Pulses: Normal pulses.      Heart sounds: Normal heart sounds. No murmur heard.    No friction rub. No gallop.   Pulmonary:      Effort: Pulmonary effort is normal.      Breath sounds: Normal breath sounds. No wheezing, rhonchi or rales.   Abdominal:      General: Abdomen is flat. Bowel sounds are normal. There is no distension.      Palpations: Abdomen is soft.      Tenderness: There is no abdominal tenderness.   Musculoskeletal:          General: No swelling or tenderness. Normal range of motion.      Cervical back: Normal range of motion and neck supple. No tenderness.      Right lower leg: No edema.      Left lower leg: No edema.   Lymphadenopathy:      Cervical: No cervical adenopathy.   Skin:     Findings: No lesion or rash.   Neurological:      General: No focal deficit present.      Mental Status: She is alert and oriented to person, place, and time.      Cranial Nerves: No cranial nerve deficit.      Sensory: No sensory deficit.      Motor: No weakness.   Psychiatric:         Mood and Affect: Mood normal.         Behavior: Behavior normal.         Thought Content: Thought content normal.         CRANIAL NERVES     CN III, IV, VI   Pupils are equal, round, and reactive to light.     Significant Labs: All pertinent labs within the past 24 hours have been reviewed.    Significant Imaging: I have reviewed all pertinent imaging results/findings within the past 24 hours.

## 2023-01-06 NOTE — PT/OT/SLP EVAL
"Physical Therapy Evaluation    Patient Name:  Gisele Meraz   MRN:  [id number]    Recommendations:     Discharge Recommendations:  (pending)   Discharge Equipment Recommendations: none   Barriers to discharge:  medical dx; decreased functional mobility/independence     Assessment:     Gisele Meraz is a 67 y.o. female admitted with a medical diagnosis of HTN. Daughter-in-law reports that while in the ED pt tried to get up on her own and fell. Has a hx of alzheimer's, fibromyalgia and RA. She presents with the following impairments/functional limitations: weakness, impaired endurance, impaired balance, impaired functional mobility, impaired self care skills, pain.    Rehab Prognosis: Good; patient would benefit from acute skilled PT services to address these deficits and reach maximum level of function.    Recent Surgery: * No surgery found *      Plan:     During this hospitalization, patient to be seen 6 x/week to address the identified rehab impairments via gait training, therapeutic activities, therapeutic exercises and progress toward the following goals:    Plan of Care Expires:  01/07/23    Subjective     Chief Complaint: increased pain to LLE  Patient/Family Comments/goals: to get better  Pain/Comfort:  Pain Rating 1: 9/10  Location - Side 1: Left  Location 1: leg  Pain Addressed 1: Nurse notified    Patients cultural, spiritual, Anabaptist conflicts given the current situation: no    Living Environment:  Pt lives alone in a [place] that is down the road from her son and daughter-in-law. They both go to patients house daily to check on her in the morning and afternoons. Was going to PT ([facility] 2x/wk.  Prior to admission, patients level of function was independent.    Equipment used at home: none.  DME owned (not currently used): rolling walker.-- refused to use RW most of the time, but if having and "off" day, would agree to.    Upon discharge, patient will have assistance from family.    Objective: "     Communicated with NSG prior to session.  Patient found HOB elevated with telemetry, pulse ox (continuous), rollbelt  upon PT entry to room.    General Precautions: Standard, fall  Orthopedic Precautions:N/A   Braces: N/A  Respiratory Status: Room air  Vitals   BP: 169/89  Exams:  Cognitive Exam:  Patient is oriented to Person and Place  RLE Strength: WFL  LLE Strength: unable to test 2/2 pain    Functional Mobility:  Bed Mobility:     Supine to Sit: moderate assistance  Sit to Supine: moderate assistance    Pt unable to tolerate further mobility at this time 2/2 increased pain to LLE. Not appropriate to attempt standing. Spoke to RN about getting further imaging.   Will progress as able.       Patient left HOB elevated with all lines intact, call button in reach, bed alarm on, DIL present, and rollbelt donned .    GOALS:   Multidisciplinary Problems       Physical Therapy Goals          Problem: Physical Therapy    Goal Priority Disciplines Outcome Goal Variances Interventions   Physical Therapy Goal     PT, PT/OT Ongoing, Progressing     Description: Goals to be met by: 23     Patient will increase functional independence with mobility by performin. Supine to sit with Stand-by Assistance  2. Sit to supine with Stand-by Assistance  3. Sit to stand transfer TBD  4. Gait  TBD                         History:     Past Medical History:   Diagnosis Date    Acid reflux     Arthritis     Dementia     Patient on Memantine BID    Fibromyalgia     Pneumonia     RA (rheumatoid arthritis)        Past Surgical History:   Procedure Laterality Date    ADENOIDECTOMY       SECTION      HYSTERECTOMY      TONSILLECTOMY         Time Tracking:     PT Received On: 23  PT Start Time: 921     PT Stop Time: 945  PT Total Time (min): 24 min     Billable Minutes: Evaluation mod      2023

## 2023-01-06 NOTE — SUBJECTIVE & OBJECTIVE
Interval History: Complaining of L hip pain that started this morning, patient had a fall in ED on day of admission. PT has not been able to work with her due to pain     Review of Systems   Constitutional:  Negative for chills, fatigue and fever.   HENT:  Negative for congestion, rhinorrhea and sore throat.    Respiratory:  Negative for cough, chest tightness and shortness of breath.    Cardiovascular:  Negative for chest pain, palpitations and leg swelling.   Gastrointestinal:  Negative for abdominal distention, abdominal pain, constipation, diarrhea, nausea and vomiting.   Genitourinary:  Negative for dysuria.   Musculoskeletal:  Negative for arthralgias, back pain and joint swelling.   Neurological:  Negative for dizziness and headaches.   Psychiatric/Behavioral:  Negative for agitation and confusion.    Objective:     Vital Signs (Most Recent):  Temp: 99.1 °F (37.3 °C) (01/06/23 1119)  Pulse: 93 (01/06/23 1119)  Resp: 18 (01/06/23 1235)  BP: 109/67 (01/06/23 1119)  SpO2: (!) 90 % (01/06/23 1119)   Vital Signs (24h Range):  Temp:  [97.4 °F (36.3 °C)-99.1 °F (37.3 °C)] 99.1 °F (37.3 °C)  Pulse:  [80-93] 93  Resp:  [16-19] 18  SpO2:  [90 %-99 %] 90 %  BP: (109-169)/(67-94) 109/67     Weight: 74.8 kg (165 lb)  Body mass index is 28.32 kg/m².    Intake/Output Summary (Last 24 hours) at 1/6/2023 1422  Last data filed at 1/6/2023 0549  Gross per 24 hour   Intake 480 ml   Output 600 ml   Net -120 ml      Physical Exam  Constitutional:       General: She is not in acute distress.     Appearance: Normal appearance.   HENT:      Head: Normocephalic and atraumatic.   Eyes:      Extraocular Movements: Extraocular movements intact.      Pupils: Pupils are equal, round, and reactive to light.   Cardiovascular:      Rate and Rhythm: Normal rate and regular rhythm.      Pulses: Normal pulses.      Heart sounds: Normal heart sounds. No murmur heard.    No friction rub. No gallop.   Pulmonary:      Effort: Pulmonary effort is  normal.      Breath sounds: Normal breath sounds. No wheezing, rhonchi or rales.   Abdominal:      General: Abdomen is flat. Bowel sounds are normal. There is no distension.      Palpations: Abdomen is soft.      Tenderness: There is no abdominal tenderness.   Musculoskeletal:         General: No swelling or tenderness. Normal range of motion.      Cervical back: Normal range of motion and neck supple. No tenderness.      Right lower leg: No edema.      Left lower leg: No edema.      Comments: Limited ROM of L LE due to pain    Lymphadenopathy:      Cervical: No cervical adenopathy.   Skin:     Findings: No lesion or rash.   Neurological:      General: No focal deficit present.      Mental Status: She is alert and oriented to person, place, and time.      Cranial Nerves: No cranial nerve deficit.      Sensory: No sensory deficit.      Motor: No weakness.   Psychiatric:         Mood and Affect: Mood normal.         Behavior: Behavior normal.         Thought Content: Thought content normal.       Significant Labs: All pertinent labs within the past 24 hours have been reviewed.    Significant Imaging: I have reviewed all pertinent imaging results/findings within the past 24 hours.

## 2023-01-06 NOTE — ASSESSMENT & PLAN NOTE
No PMH of HTN, not taking any medications  Will start amlodipine 5 mg QD since blood pressure remains above goal  Continue hydralazine PRN for BP > 180/100

## 2023-01-06 NOTE — PROGRESS NOTES
[facility] - 3rd [facility] Medicine  Progress Note    Patient Name: Gisele Meraz  MRN: [id number]  Patient Class: IP- Inpatient   Admission Date: 1/4/2023  Length of Stay: 2 days  Attending Physician: Cindy Smith MD  Primary Care Provider: Cindy Almaguer MD        Subjective:     Principal Problem:<principal problem not specified>        HPI:  Ms Jaquze is a 66 y/o female patient with past medical history significant for Alzheimer disease, fibromyalgia, rheumatoid arthritis who presented to ED with complaint of elevated blood pressure. Most of history is obtained from daughter in law who is primary caregiver. She states patient started feeling dizzy Tuesday night, they called office and labs were done which showed elevated BUN and creatinine, urine dipstick was obtained at home which was positive for leukocyte esterase. Wednesday morning blood pressure was checked at home and was 200/91, they called office and were instructed to go to ED. Patient denies history of hypertension and is not taking any medications. In ED blood pressure responded to amlodipine and IV hydralazine. Per daughter in law patient was going to be discharged however she was noted to sway towards the right side when walking. CT and MRI head were negative for ischemic stroke. Blood pressure remains elevated but improved. UA was obtained in ED which was negative. Today patient denies any complaints. Physical therapy has been consulted and will work with her later today.       Overview/Hospital Course:  No notes on file    Interval History: Complaining of L hip pain that started this morning, patient had a fall in ED on day of admission. PT has not been able to work with her due to pain     Review of Systems   Constitutional:  Negative for chills, fatigue and fever.   HENT:  Negative for congestion, rhinorrhea and sore throat.    Respiratory:  Negative for cough, chest tightness and shortness  of breath.    Cardiovascular:  Negative for chest pain, palpitations and leg swelling.   Gastrointestinal:  Negative for abdominal distention, abdominal pain, constipation, diarrhea, nausea and vomiting.   Genitourinary:  Negative for dysuria.   Musculoskeletal:  Negative for arthralgias, back pain and joint swelling.   Neurological:  Negative for dizziness and headaches.   Psychiatric/Behavioral:  Negative for agitation and confusion.    Objective:     Vital Signs (Most Recent):  Temp: 99.1 °F (37.3 °C) (01/06/23 1119)  Pulse: 93 (01/06/23 1119)  Resp: 18 (01/06/23 1235)  BP: 109/67 (01/06/23 1119)  SpO2: (!) 90 % (01/06/23 1119)   Vital Signs (24h Range):  Temp:  [97.4 °F (36.3 °C)-99.1 °F (37.3 °C)] 99.1 °F (37.3 °C)  Pulse:  [80-93] 93  Resp:  [16-19] 18  SpO2:  [90 %-99 %] 90 %  BP: (109-169)/(67-94) 109/67     Weight: 74.8 kg (165 lb)  Body mass index is 28.32 kg/m².    Intake/Output Summary (Last 24 hours) at 1/6/2023 1422  Last data filed at 1/6/2023 0549  Gross per 24 hour   Intake 480 ml   Output 600 ml   Net -120 ml      Physical Exam  Constitutional:       General: She is not in acute distress.     Appearance: Normal appearance.   HENT:      Head: Normocephalic and atraumatic.   Eyes:      Extraocular Movements: Extraocular movements intact.      Pupils: Pupils are equal, round, and reactive to light.   Cardiovascular:      Rate and Rhythm: Normal rate and regular rhythm.      Pulses: Normal pulses.      Heart sounds: Normal heart sounds. No murmur heard.    No friction rub. No gallop.   Pulmonary:      Effort: Pulmonary effort is normal.      Breath sounds: Normal breath sounds. No wheezing, rhonchi or rales.   Abdominal:      General: Abdomen is flat. Bowel sounds are normal. There is no distension.      Palpations: Abdomen is soft.      Tenderness: There is no abdominal tenderness.   Musculoskeletal:         General: No swelling or tenderness. Normal range of motion.      Cervical back: Normal range  of motion and neck supple. No tenderness.      Right lower leg: No edema.      Left lower leg: No edema.      Comments: Limited ROM of L LE due to pain    Lymphadenopathy:      Cervical: No cervical adenopathy.   Skin:     Findings: No lesion or rash.   Neurological:      General: No focal deficit present.      Mental Status: She is alert and oriented to person, place, and time.      Cranial Nerves: No cranial nerve deficit.      Sensory: No sensory deficit.      Motor: No weakness.   Psychiatric:         Mood and Affect: Mood normal.         Behavior: Behavior normal.         Thought Content: Thought content normal.       Significant Labs: All pertinent labs within the past 24 hours have been reviewed.    Significant Imaging: I have reviewed all pertinent imaging results/findings within the past 24 hours.      Assessment/Plan:      Ataxic gait  Unclear etiology  CT and MRI head negative  Now that blood pressure is better controlled, will evaluate for any improvement   PT consulted, unable to work with them due to pain  Started morphine 2 mg Q 4 h PRN and Norco 5 mg Q 6 h PRN  Will need to work with PT to evaluate disposition at discharge       Hypertension  No PMH of HTN, not taking any medications  Blood pressure has been uncontrolled this morning, likely due to pain  Conitnue amlodipine 5 mg QD since blood pressure remains above goal  Continue hydralazine PRN for BP > 180/100      Rheumatoid arthritis  Continue home medications       Fibromyalgia  Continue home medications       Early onset Alzheimer's disease with behavioral disturbance  Continue home medications         VTE Risk Mitigation (From admission, onward)         Ordered     IP VTE HIGH RISK PATIENT  Once         01/04/23 2155     Place sequential compression device  Until discontinued         01/04/23 2155                Discharge Planning   DANIEL:      Code Status: Full Code   Is the patient medically ready for discharge?:     Reason for patient still  in hospital (select all that apply): Treatment  Discharge Plan A: Other                  Cindy Almaguer MD  Department of Hospital Medicine   [facility] - 3rd Floor Medical Telemetry

## 2023-01-06 NOTE — PLAN OF CARE
Problem: Occupational Therapy  Goal: Occupational Therapy Goal  Description: Goals to be met by: 1/20/23     Patient will increase functional independence with ADLs by performing:    UE Dressing with Stand-by Assistance.  Grooming task while seated EOB with Stand-by Assistance.  LE Dressing with Moderate Assistance and Assistive Devices as needed.  Toileting from bedside commode with Moderate Assistance for hygiene and clothing management.     Outcome: Ongoing, Progressing

## 2023-01-06 NOTE — CONSULTS
[facility] - 3rd Floor Medical Telemetry  Orthopedic Trauma  Consult Note    Patient Name: Gisele Meraz  MRN: [id number]  Admission Date: 2023  Hospital Length of Stay: 2 days  Attending Provider: Cindy Smith MD  Primary Care Provider: Cindy Almaguer MD        Inpatient consult to Orthopedics  Consult performed by: Rodrick Andrew DO  Consult ordered by: Cindy Smith MD      Subjective:         Chief Complaint:   Chief Complaint   Patient presents with    Hypertension     C/o dizziness and hypertension onset last night, last BP was 200/95 this morning. Able to ambulate with steady gait.        HPI:  Patient has dizziness.  Patient also has Alzheimer's.  Family bedside Patient fell onto the left hip has painful range of motion left hip displaced femoral neck fracture.  Dull achy pain left hip worse with motion better rest.  Pain medication also makes this better.  Nonradiating    Past Medical History:   Diagnosis Date    Acid reflux     Arthritis     Dementia     Patient on Memantine BID    Fibromyalgia     Pneumonia     RA (rheumatoid arthritis)        Past Surgical History:   Procedure Laterality Date    ADENOIDECTOMY       SECTION      HYSTERECTOMY      TONSILLECTOMY         Review of patient's allergies indicates:   Allergen Reactions    Penicillin Hives    Penicillins Swelling       Current Facility-Administered Medications   Medication    acetaminophen tablet 650 mg    amLODIPine tablet 5 mg    donepeziL tablet 10 mg    DULoxetine DR capsule 60 mg    hydrALAZINE injection 5 mg    HYDROcodone-acetaminophen 5-325 mg per tablet 1 tablet    leflunomide tablet 20 mg    melatonin tablet 6 mg    memantine tablet 10 mg    morphine injection 2 mg    ondansetron disintegrating tablet 8 mg    pantoprazole EC tablet 40 mg    predniSONE tablet 5 mg    QUEtiapine tablet 25 mg    sodium chloride 0.9% flush 10 mL     Family History       Problem Relation (Age of Onset)     "Dementia Father, Maternal Grandmother, Maternal Grandfather          Tobacco Use    Smoking status: Former     Types: Cigarettes    Smokeless tobacco: Never   Substance and Sexual Activity    Alcohol use: Not Currently    Drug use: Never    Sexual activity: Not on file       ROS:  Constitutional: Denies fever chills  Eyes: No change in vision  ENT: No ringing or current infections  CV: No chest pain  Resp: No labored breathing  MSK: Pain evident at site of injury located in HPI,   Integ: No signs of abrasions or lacerations  Neuro: No numbness or tingling  Lymphatic: No swelling outside the area of injury   Objective:     Vital Signs (Most Recent):  Temp: 99.1 °F (37.3 °C) (01/06/23 1119)  Pulse: 93 (01/06/23 1119)  Resp: 18 (01/06/23 1235)  BP: 109/67 (01/06/23 1119)  SpO2: (!) 90 % (01/06/23 1119)   Vital Signs (24h Range):  Temp:  [97.8 °F (36.6 °C)-99.1 °F (37.3 °C)] 99.1 °F (37.3 °C)  Pulse:  [80-93] 93  Resp:  [16-19] 18  SpO2:  [90 %-99 %] 90 %  BP: (109-169)/(67-94) 109/67     Weight: 74.8 kg (165 lb)  Height: 5' 4" (162.6 cm)  Body mass index is 28.32 kg/m².      Intake/Output Summary (Last 24 hours) at 1/6/2023 1630  Last data filed at 1/6/2023 1440  Gross per 24 hour   Intake 900 ml   Output 750 ml   Net 150 ml       Ortho/SPM Exam  General the patient is alert  no acute distress nontoxic-appearing appropriate affect.    Constitutional: Vital signs are examined and stable.  Resp: No signs of labored breathing               LLE: -Skin: No signs of new abrasions or lacerations, no scars           -MSK:  EHL/FHL, Gastroc/Tib anterior Strength 5/5, painful log roll           -Neuro:  Sensation intact to light touch L3-S1 dermatomes           -Lymphatic: No signs of lymphadenopathy           -CV: Capillary refill is less than 2 seconds. DP/PT pulses 2/4. Compartments soft and compressible                          Significant Labs:   Recent Lab Results         01/06/23  0413        Anion Gap 7.0       Baso # " 0.02       Basophil % 0.3       BUN 14.2       BUN/CREAT RATIO 15       Calcium 8.2       Chloride 106       CO2 26       Creatinine 0.92       eGFR 68  Comment:                      EGFR INTERPRETATION    Beginning 8/15/22 we are reporting the eGFRcr calculation as recommended by the National Kid[employer]. The eGFRcr equation has similar overall performance characteristics to the older equation, but the values may differ by more than 10% particularly at higher values of eGFRcr and younger adult ages.    NKF stages of chronic kidney disease (CKD)  Stage 1: Kidney damage with normal or increased eGFR (>90 mL/min/1.73 m^2)  Stage 2: Mild reduction in GFR (60-89 mL/min/1.73 m^2)  Stage 3a: Moderate reduction in GFR (45-59 mL/min/1.73 m^2)  Stage 3b: Moderate reduction in GFR (30-44 mL/min/1.73 m^2)  Stage 4: Severe reduction in GFR (15-29 mL/min/1.73 m^2)  Stage 5: Kidney failure (GFR <15 mL/min/1.73 m^2)           Eos # 0.11       Eosinophil % 1.5       Glucose 117       Hematocrit 33.2       Hemoglobin 10.7       Immature Grans (Abs) 0.02       Immature Granulocytes 0.3       Lymph # 1.07       LYMPH % 14.9       MCH 29.6       MCHC 32.2       MCV 92.0       Mono # 0.43       Mono % 6.0       MPV 11.3       Neut # 5.55       Neut % 77.0       nRBC 0.0       Platelets 179       Potassium 3.8       RBC 3.61       RDW 13.1       Sodium 139       WBC 7.2             All pertinent labs within the past 24 hours have been reviewed.  Recent Lab Results  (Last 5 results in the past 72 hours)        01/06/23  0413   01/05/23  0454   01/04/23  1535   01/04/23  1413   01/04/23  0954        Albumin/Globulin Ratio   1.3       1.3       Albumin   3.5       3.3       Alkaline Phosphatase   201       199       ALT   26       25       Anion Gap 7.0               Appearance, UA     Clear           aPTT       24.3  Comment: For Minimal Heparin Infusion, the goal aPTT 64-85 seconds corresponds to an anti-Xa of 0.3-0.5.    For Low  Intensity and High Intensity Heparin, the goal aPTT  seconds corresponds to an anti-Xa of 0.3-0.7         AST   23       22       Bacteria, UA     None Seen           Baso # 0.02   0.05       0.05       Basophil % 0.3   0.5       0.7       BILIRUBIN TOTAL   0.5       0.5       Bilirubin, UA     Negative           BUN 14.2   15.5       14.3       BUN/CREAT RATIO 15               Calcium 8.2   9.0       8.3       Chloride 106   108       105       CO2 26   27       29       Color, UA     Yellow           Creatinine 0.92   1.01       1.13       eGFR 68  Comment:                      EGFR INTERPRETATION    Beginning 8/15/22 we are reporting the eGFRcr calculation as recommended by the National Kidney Foundation. The eGFRcr equation has similar overall performance characteristics to the older equation, but the values may differ by more than 10% particularly at higher values of eGFRcr and younger adult ages.    NKF stages of chronic kidney disease (CKD)  Stage 1: Kidney damage with normal or increased eGFR (>90 mL/min/1.73 m^2)  Stage 2: Mild reduction in GFR (60-89 mL/min/1.73 m^2)  Stage 3a: Moderate reduction in GFR (45-59 mL/min/1.73 m^2)  Stage 3b: Moderate reduction in GFR (30-44 mL/min/1.73 m^2)  Stage 4: Severe reduction in GFR (15-29 mL/min/1.73 m^2)  Stage 5: Kidney failure (GFR <15 mL/min/1.73 m^2)       61       53       Eos # 0.11   0.14       0.13       Eosinophil % 1.5   1.4       1.7       Globulin, Total   2.6       2.5       Glucose 117   90       109       Glucose, UA     Negative           Hematocrit 33.2   37.1       35.3       Hemoglobin 10.7   11.5       11.0       Immature Grans (Abs) 0.02   0.05       0.01       Immature Granulocytes 0.3   0.5       0.1       INR       0.86         Ketones, UA     Negative           Leukocytes, UA     Negative           Lymph # 1.07   2.41       1.51       LYMPH % 14.9   23.9       19.7       MCH 29.6   29.3       29.4       MCHC 32.2   31.0       31.2        MCV 92.0   94.6       94.4       Mono # 0.43   0.72       0.59       Mono % 6.0   7.1       7.7       MPV 11.3   11.3       11.5       Neut # 5.55   6.73       5.37       Neut % 77.0   66.6       70.1       NITRITE UA     Negative           nRBC 0.0   0.0       0.0       Occult Blood UA     Negative           pH, UA     6.0           Platelets 179   246       227       Potassium 3.8   4.4       3.6       PROTEIN TOTAL   6.1       5.8       Protein, UA     Negative           Protime       11.7         RBC 3.61   3.92       3.74       RBC, UA     <5           RDW 13.1   12.9       12.8       Sodium 139   146       142       Specific Gravity,UA     1.009           Squam Epithel, UA     <5           Troponin I         <0.010       Urobilinogen, UA     0.2           WBC, UA     <5           WBC 7.2   10.1       7.7                               Significant Imaging: I have reviewed all pertinent imaging results/findings.  X-Ray Elbow Complete Left    Result Date: 1/5/2023  EXAMINATION: XR ELBOW COMPLETE 3 VIEW LEFT CLINICAL HISTORY: Unspecified fall, initial encounter COMPARISON: None. FINDINGS: No acute displaced fractures or dislocations. Joint spaces preserved. No blastic or lytic lesions. Soft tissues within normal limits.     No acute osseous abnormality. Electronically signed by: Marcus Stovall Date:    01/05/2023 Time:    10:03    X-Ray Hip 2 or 3 views Left (with Pelvis when performed)    Result Date: 1/6/2023  EXAMINATION: XR HIP WITH PELVIS WHEN PERFORMED, 2 OR 3 VIEWS LEFT CLINICAL HISTORY: hip pain after fall; COMPARISON: None. FINDINGS: There is a subcapital fracture of the left hip with no other definite fractures or dislocations There are degenerative changes of the inferior medial aspect of the right hip joint with degenerative changes of the lumbosacral spine No blastic or lytic lesions. Soft tissues within normal limits.     Fracture as above. Degenerative changes. Electronically signed by: Marcus  Sia Date:    01/06/2023 Time:    13:52    X-Ray Knee 1 or 2 View Left    Result Date: 1/5/2023  EXAMINATION: XR KNEE 1 OR 2 VIEW LEFT CLINICAL HISTORY: Unspecified fall, initial encounter COMPARISON: None. FINDINGS: No acute displaced fractures or dislocations. There is some narrowing of the medial compartment of the knee joint articular spaces are otherwise preserved with smooth articular surfaces No blastic or lytic lesions. Soft tissues within normal limits.     Degenerative changes. Electronically signed by: Marcus Stovall Date:    01/05/2023 Time:    10:16    CT Head Without Contrast    Result Date: 1/5/2023  EXAMINATION: CT HEAD WITHOUT CONTRAST CLINICAL HISTORY: Head trauma, minor (Age >= 65y); TECHNIQUE: CT imaging of the head performed from the skull base to the vertex without intravenous contrast.  mGycm. Automatic exposure control, adjustment of mA/kV or iterative reconstruction technique was used to reduce radiation. COMPARISON: Yesterday FINDINGS: There is no acute cortical infarct, hemorrhage or mass lesion.  No new parenchymal attenuation abnormality.  Ventricular size is stable. Visualized paranasal sinuses and mastoid air cells are clear.  Calvarium grossly intact.     No acute intracranial findings or significant interval change compared to yesterday. No significant discrepancy with the preliminary report. Electronically signed by: Jaime Tovar Date:    01/05/2023 Time:    06:39    CT Head Without Contrast    Result Date: 1/4/2023  EXAMINATION: CT HEAD WITHOUT CONTRAST CLINICAL HISTORY: Dizziness, persistent/recurrent, cardiac or vascular cause suspected; TECHNIQUE: CT imaging of the head performed from the skull base to the vertex without intravenous contrast.  mGycm. Automatic exposure control, adjustment of mA/kV or iterative reconstruction technique was used to reduce radiation. COMPARISON: 22 August 2022 FINDINGS: There is no acute cortical infarct, hemorrhage or mass  lesion.  There is similar patchy hypoattenuation in the cerebral white matter.  There is no new parenchymal attenuation abnormality.  Ventricular size is stable. Visualized paranasal sinuses and mastoid air cells are clear.     No acute intracranial findings. Electronically signed by: Jaime Tovar Date:    01/04/2023 Time:    10:07    MRI Brain Without Contrast    Result Date: 1/4/2023  EXAMINATION MRI BRAIN WITHOUT CONTRAST CLINICAL HISTORY Dizziness, non-specific;Dizziness, persistent/recurrent, cardiac or vascular cause suspected; TECHNIQUE Multiplanar, multisequence MR images were obtained without the intravenous administration of gadolinium-based contrast media. COMPARISON None available at the time of initial interpretation. FINDINGS Exam quality: adequate for evaluation Brain parenchyma: No restricted diffusion or other suggestion of acute ischemic insult. White matter T2/FLAIR hyperintense signal without associated restricted diffusion statistically represents changes of chronic microvascular disease. No findings to suggest intracranial hemorrhage. No discrete mass, localized mass effect, or midline shift. Differentiation of the gray-white border is grossly preserved. CSF spaces: Normal in size and configuration of the ventricles. No abnormal extra-axial fluid. The basal cisterns are preserved. Sulcal volume appears normal for patient age. Sella/Suprasellar regions: No abnormalities. Other findings: Normal flow signal voids within the large intracranial vessels or dural sinuses. No focal abnormalities of the regional osseous structures and extracranial soft tissues. Mastoid air cells are well aerated. Paranasal sinuses are clear, with no fluid level. IMPRESSION 1. No acute intracranial abnormality. 2. Age-related atrophy and chronic sequela of white matter microvascular disease. 3. Additional chronic secondary details discussed above. Electronically signed by: Glen Maharaj Date:    01/04/2023  Time:    21:11    X-Ray Chest AP Portable    Result Date: 1/4/2023  EXAMINATION: XR CHEST AP PORTABLE CLINICAL HISTORY: hypertension;, . COMPARISON: January 10, 2022 FINDINGS: No alveolar consolidation, effusion, or pneumothorax is seen.   The thoracic aorta is normal  cardiac silhouette, central pulmonary vessels and mediastinum are normal in size and are grossly unremarkable.   visualized osseous structures are grossly unremarkable.     No acute chest disease is identified. Electronically signed by: Marcus Stovall Date:    01/04/2023 Time:    11:39    X-Ray Pelvis Routine AP    Result Date: 1/5/2023  EXAMINATION: XR PELVIS ROUTINE AP CLINICAL HISTORY: fall; COMPARISON: None. FINDINGS: No acute displaced fractures or dislocations. There is some narrowing of the inferior medial aspect of both hip joints with some degenerative changes of the sacroiliac joints and lumbosacral spine articular spaces are otherwise preserved with smooth articular surfaces No blastic or lytic lesions. Soft tissues within normal limits.     Degenerative changes. Electronically signed by: Marcus Stovall Date:    01/05/2023 Time:    10:22    X-Ray Shoulder 2 or More Views Left    Result Date: 1/5/2023  EXAMINATION: XR SHOULDER COMPLETE 2 OR MORE VIEWS LEFT CLINICAL HISTORY: fall; COMPARISON: None. March 22, 2022 FINDINGS: No acute displaced fractures or dislocations. There is some narrowing of the acromioclavicular joint glenohumeral joint essentially preserved with smooth articular surfaces No blastic or lytic lesions. Soft tissues within normal limits.     Degenerative changes. 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  Patient has a displaced left femoral neck fracture meeting surgical indications for hip hemiarthroplasty.  Patient is a poor historian.  Patient understands the risks and benefits of procedure will reach out to the family members as well.  Plan for surgery tomorrow.  NPO at midnight.      I explained that surgery and the nature of their condition are not without risks. These include, but are not limited to, bleeding, infection, neurovascular compromise, malunion, nonunion, hardware complications, wound complications, scarring, cosmetic defects, need for later and/or repeated surgeries, pain, loss of ROM, loss of function, PTOA, deformity, stance/gait and/or functional abnormalities, thromboembolic complications, compartment syndrome, loss of limb, loss of life, anesthetic complications, and other imponderables. I explained that these can occur despite the adequacy of treatments rendered, and that their risks are heightened given the nature of their condition. They verbalized understanding. They would like to continue with surgery at this time. If appropriate family was involved with surgical discussion.             This note/OR report was created with the assistance of  voice recognition software or phone  dictation.  There may be transcription errors as a result of using this technology however minimal. Effort has been made to assure accuracy of transcription but any obvious errors or omissions should be clarified with the author of the document.       Rodrick Andrew, DO   Orthopedic Trauma Surgery  [facility] - 3rd Floor Medical Telemetry

## 2023-01-06 NOTE — PLAN OF CARE
Problem: Physical Therapy  Goal: Physical Therapy Goal  Description: Goals to be met by: 23     Patient will increase functional independence with mobility by performin. Supine to sit with Stand-by Assistance  2. Sit to supine with Stand-by Assistance  3. Sit to stand transfer TBD  4. Gait  TBD    Outcome: Ongoing, Progressing

## 2023-01-06 NOTE — ASSESSMENT & PLAN NOTE
Unclear etiology  CT and MRI head negative  Now that blood pressure is better controlled, will evaluate for any improvement   PT consulted, will follow up on recommendations

## 2023-01-06 NOTE — PT/OT/SLP EVAL
Occupational Therapy   Evaluation    Name: Gisele Meraz  MRN: [id number]  Admitting Diagnosis: ataxic gait, HTN, RA, fibromyalgia, early onset Alzheimer's disease with behavioral disturbance  Recent Surgery: * No surgery found *      Recommendations:     Discharge Recommendations: nursing facility, skilled  Discharge Equipment Recommendations: bedside commode, dressing devices, shower chair vs TTB (TBD, pending progress)  Barriers to discharge: medical dx    Assessment:     Gisele Meraz is a 67 y.o. female with a medical diagnosis of ataxic gait, HTN, RA, fibromyalgia, early onset Alzheimer's disease with behavioral disturbance. She presents with pain on proximal aspect of LLE. Performance deficits affecting function: weakness, impaired endurance, impaired self care skills, impaired functional mobility, impaired balance, decreased lower extremity function, decreased safety awareness, pain.      Rehab Prognosis: Fair; patient would benefit from acute skilled OT services to address these deficits and reach maximum level of function.       Plan:     Patient to be seen 6 x/week to address the above listed problems via self-care/home management, therapeutic activities, therapeutic exercises  Plan of Care Expires: 01/20/23  Plan of Care Reviewed with: patient (daughter-in-law)    Subjective     Chief Complaint: Pt c/o pain on proximal aspect of LLE, rating pain 9.5/10 with mvt. RN notified of pt's pain and her request for pain medication.   OT also spoke with RN about further imaging.  Patient/Family Comments/goals: Return to PLOF.  Pt's DIL present, and reported that pt has h/o 2 falls.    Occupational Profile:  Living Environment: Lives alone with dog in [facility] with small threshold to enter. Utilized walk-in shower with small threshold to enter. Pt also owns a tub/shower.  Previous level of function: Independent with UB dressing and toileting tasks, required min A to perform LB dressing task and SBA to perform showering  "task, dep A with IADLs, and pt was not driving.  Equipment Used at Home: walker, rolling (utilized "sometimes")  Assistance upon Discharge: Pt's son and DIL live nearby and visited/assist pt in AM and PM.    Patients cultural, spiritual, Islam conflicts given the current situation: no    Objective:     Communicated with: RN prior to session. Patient found HOB elevated with blood pressure cuff, telemetry, pulse ox (continuous), PureWick, bed alarm, LLE elevated on pillow, and posey roll belt donned upon OT entry to room.    General Precautions: Standard, fall  Respiratory Status: Room air  Vitals:  BP: 127/73  TX: 90, 88  O2: 92-93%    Occupational Performance:    Bed Mobility:    Pt t/f from lying with HOB elevated to seated EOB with max A provided, using bed rail. Pt required assist to lift LLE and trunk.  Patient completed Sit to Supine with moderate assistance provided, requiring assist to lift BLEs.    Functional Mobility/Transfers:  Did not attempt tasks 2/2 pt's increased pain in LLE.     Activities of Daily Living:  Feeding:  Setup A provided for task.    Cognitive/Visual Perceptual:  Cognitive/Psychosocial Skills:     -       Oriented to: Person   -       Follows Commands/attention:Follows one-step commands and Follows two-step commands  -       Safety awareness/insight to disability: impaired     Physical Exam:  Balance:    -       Pt demonstrated R trunk lean while seated EOB (in efforts to decrease pain), requiring min-mod A to maintain sitting balance.  Upper Extremity Range of Motion:     -       BUEs: WFL except mod deficits noted in AROM of B shoulder flex  Upper Extremity Strength:    -       BUEs: Grossly 4/5 except B shoulder flex=3-/5    Patient left HOB elevated with all lines intact, call button in reach, bed alarm on, RN notified, pt's DIL present, and posey roll belt re-donned.    GOALS:   Multidisciplinary Problems       Occupational Therapy Goals          Problem: Occupational Therapy "    Goal Priority Disciplines Outcome Interventions   Occupational Therapy Goal     OT, PT/OT Ongoing, Progressing    Description: Goals to be met by: 23     Patient will increase functional independence with ADLs by performing:    UE Dressing with Stand-by Assistance.  Grooming task while seated EOB with Stand-by Assistance.  LE Dressing with Moderate Assistance and Assistive Devices as needed.  Toileting from bedside commode with Moderate Assistance for hygiene and clothing management.                            History:     Past Medical History:   Diagnosis Date    Acid reflux     Arthritis     Dementia     Patient on Memantine BID    Fibromyalgia     Pneumonia     RA (rheumatoid arthritis)          Past Surgical History:   Procedure Laterality Date    ADENOIDECTOMY       SECTION      HYSTERECTOMY      TONSILLECTOMY         Time Tracking:     OT Date of Treatment: 23  OT Start Time: 1038  OT Stop Time: 1114  OT Total Time (min): 36 min    Billable Minutes: Evaluation Mod complexity 36 mins    2023

## 2023-01-07 ENCOUNTER — ANESTHESIA EVENT (OUTPATIENT)
Dept: SURGERY | Facility: HOSPITAL | Age: 68
DRG: 522 | End: 2023-01-07
Payer: MEDICARE

## 2023-01-07 ENCOUNTER — ANESTHESIA (OUTPATIENT)
Dept: SURGERY | Facility: HOSPITAL | Age: 68
DRG: 522 | End: 2023-01-07
Payer: MEDICARE

## 2023-01-07 LAB
ABORH RETYPE: NORMAL
ANION GAP SERPL CALC-SCNC: 7 MEQ/L
BASOPHILS # BLD AUTO: 0.04 X10(3)/MCL (ref 0–0.2)
BASOPHILS NFR BLD AUTO: 0.5 %
BUN SERPL-MCNC: 12.8 MG/DL (ref 9.8–20.1)
CALCIUM SERPL-MCNC: 8.2 MG/DL (ref 8.4–10.2)
CHLORIDE SERPL-SCNC: 107 MMOL/L (ref 98–107)
CO2 SERPL-SCNC: 26 MMOL/L (ref 23–31)
CREAT SERPL-MCNC: 0.83 MG/DL (ref 0.55–1.02)
CREAT/UREA NIT SERPL: 15
EOSINOPHIL # BLD AUTO: 0.19 X10(3)/MCL (ref 0–0.9)
EOSINOPHIL NFR BLD AUTO: 2.5 %
ERYTHROCYTE [DISTWIDTH] IN BLOOD BY AUTOMATED COUNT: 13.1 % (ref 11–14.5)
GFR SERPLBLD CREATININE-BSD FMLA CKD-EPI: 77 MLS/MIN/1.73/M2
GLUCOSE SERPL-MCNC: 105 MG/DL (ref 82–115)
GROUP & RH: NORMAL
HCT VFR BLD AUTO: 33.1 % (ref 37–47)
HGB BLD-MCNC: 10.4 GM/DL (ref 12–16)
IMM GRANULOCYTES # BLD AUTO: 0.04 X10(3)/MCL (ref 0–0.04)
IMM GRANULOCYTES NFR BLD AUTO: 0.5 %
INDIRECT COOMBS GEL: NORMAL
LYMPHOCYTES # BLD AUTO: 1.63 X10(3)/MCL (ref 0.6–4.6)
LYMPHOCYTES NFR BLD AUTO: 21.8 %
MCH RBC QN AUTO: 29.8 PG
MCHC RBC AUTO-ENTMCNC: 31.4 MG/DL (ref 33–36)
MCV RBC AUTO: 94.8 FL (ref 80–94)
MONOCYTES # BLD AUTO: 0.75 X10(3)/MCL (ref 0.1–1.3)
MONOCYTES NFR BLD AUTO: 10 %
NEUTROPHILS # BLD AUTO: 4.82 X10(3)/MCL (ref 2.1–9.2)
NEUTROPHILS NFR BLD AUTO: 64.7 %
NRBC BLD AUTO-RTO: 0 % (ref 0–1)
PLATELET # BLD AUTO: 167 X10(3)/MCL (ref 140–371)
PMV BLD AUTO: 11.6 FL (ref 9.4–12.4)
POTASSIUM SERPL-SCNC: 3.9 MMOL/L (ref 3.5–5.1)
RBC # BLD AUTO: 3.49 X10(6)/MCL (ref 4.2–5.4)
SODIUM SERPL-SCNC: 140 MMOL/L (ref 136–145)
WBC # SPEC AUTO: 7.5 X10(3)/MCL (ref 4.5–11.5)

## 2023-01-07 PROCEDURE — 27236 PR FEMORAL FX, OPEN TX: ICD-10-PCS | Mod: AS,LT,, | Performed by: PHYSICIAN ASSISTANT

## 2023-01-07 PROCEDURE — 25000003 PHARM REV CODE 250: Performed by: PHYSICIAN ASSISTANT

## 2023-01-07 PROCEDURE — 37000009 HC ANESTHESIA EA ADD 15 MINS: Performed by: ORTHOPAEDIC SURGERY

## 2023-01-07 PROCEDURE — 88305 TISSUE EXAM BY PATHOLOGIST: CPT | Performed by: ORTHOPAEDIC SURGERY

## 2023-01-07 PROCEDURE — 25000003 PHARM REV CODE 250: Performed by: INTERNAL MEDICINE

## 2023-01-07 PROCEDURE — 63600175 PHARM REV CODE 636 W HCPCS: Performed by: ANESTHESIOLOGY

## 2023-01-07 PROCEDURE — 63600175 PHARM REV CODE 636 W HCPCS: Performed by: PHYSICIAN ASSISTANT

## 2023-01-07 PROCEDURE — 25000003 PHARM REV CODE 250: Performed by: STUDENT IN AN ORGANIZED HEALTH CARE EDUCATION/TRAINING PROGRAM

## 2023-01-07 PROCEDURE — 99233 PR SUBSEQUENT HOSPITAL CARE,LEVL III: ICD-10-PCS | Mod: ,,, | Performed by: INTERNAL MEDICINE

## 2023-01-07 PROCEDURE — C1776 JOINT DEVICE (IMPLANTABLE): HCPCS | Performed by: ORTHOPAEDIC SURGERY

## 2023-01-07 PROCEDURE — 36415 COLL VENOUS BLD VENIPUNCTURE: CPT | Performed by: STUDENT IN AN ORGANIZED HEALTH CARE EDUCATION/TRAINING PROGRAM

## 2023-01-07 PROCEDURE — 99233 SBSQ HOSP IP/OBS HIGH 50: CPT | Mod: ,,, | Performed by: INTERNAL MEDICINE

## 2023-01-07 PROCEDURE — 63600175 PHARM REV CODE 636 W HCPCS

## 2023-01-07 PROCEDURE — 25000003 PHARM REV CODE 250: Performed by: NURSE ANESTHETIST, CERTIFIED REGISTERED

## 2023-01-07 PROCEDURE — 51701 INSERT BLADDER CATHETER: CPT

## 2023-01-07 PROCEDURE — 36000710: Performed by: ORTHOPAEDIC SURGERY

## 2023-01-07 PROCEDURE — 99232 SBSQ HOSP IP/OBS MODERATE 35: CPT | Mod: 57,,, | Performed by: ORTHOPAEDIC SURGERY

## 2023-01-07 PROCEDURE — C1713 ANCHOR/SCREW BN/BN,TIS/BN: HCPCS | Performed by: ORTHOPAEDIC SURGERY

## 2023-01-07 PROCEDURE — 86923 COMPATIBILITY TEST ELECTRIC: CPT | Performed by: ANESTHESIOLOGY

## 2023-01-07 PROCEDURE — 25000003 PHARM REV CODE 250: Performed by: ORTHOPAEDIC SURGERY

## 2023-01-07 PROCEDURE — 21400001 HC TELEMETRY ROOM

## 2023-01-07 PROCEDURE — 36000711: Performed by: ORTHOPAEDIC SURGERY

## 2023-01-07 PROCEDURE — 27236 TREAT THIGH FRACTURE: CPT | Mod: LT,,, | Performed by: ORTHOPAEDIC SURGERY

## 2023-01-07 PROCEDURE — 99232 PR SUBSEQUENT HOSPITAL CARE,LEVL II: ICD-10-PCS | Mod: 57,,, | Performed by: ORTHOPAEDIC SURGERY

## 2023-01-07 PROCEDURE — 37000008 HC ANESTHESIA 1ST 15 MINUTES: Performed by: ORTHOPAEDIC SURGERY

## 2023-01-07 PROCEDURE — 27236 TREAT THIGH FRACTURE: CPT | Mod: AS,LT,, | Performed by: PHYSICIAN ASSISTANT

## 2023-01-07 PROCEDURE — 86900 BLOOD TYPING SEROLOGIC ABO: CPT | Performed by: ANESTHESIOLOGY

## 2023-01-07 PROCEDURE — S5010 5% DEXTROSE AND 0.45% SALINE: HCPCS | Performed by: INTERNAL MEDICINE

## 2023-01-07 PROCEDURE — 63600175 PHARM REV CODE 636 W HCPCS: Performed by: NURSE ANESTHETIST, CERTIFIED REGISTERED

## 2023-01-07 PROCEDURE — 80048 BASIC METABOLIC PNL TOTAL CA: CPT | Performed by: STUDENT IN AN ORGANIZED HEALTH CARE EDUCATION/TRAINING PROGRAM

## 2023-01-07 PROCEDURE — 63600175 PHARM REV CODE 636 W HCPCS: Performed by: STUDENT IN AN ORGANIZED HEALTH CARE EDUCATION/TRAINING PROGRAM

## 2023-01-07 PROCEDURE — 27236 PR FEMORAL FX, OPEN TX: ICD-10-PCS | Mod: LT,,, | Performed by: ORTHOPAEDIC SURGERY

## 2023-01-07 PROCEDURE — 71000033 HC RECOVERY, INTIAL HOUR: Performed by: ORTHOPAEDIC SURGERY

## 2023-01-07 PROCEDURE — 63600175 PHARM REV CODE 636 W HCPCS: Performed by: ORTHOPAEDIC SURGERY

## 2023-01-07 PROCEDURE — 27201423 OPTIME MED/SURG SUP & DEVICES STERILE SUPPLY: Performed by: ORTHOPAEDIC SURGERY

## 2023-01-07 PROCEDURE — 25000003 PHARM REV CODE 250

## 2023-01-07 PROCEDURE — 85025 COMPLETE CBC W/AUTO DIFF WBC: CPT | Performed by: STUDENT IN AN ORGANIZED HEALTH CARE EDUCATION/TRAINING PROGRAM

## 2023-01-07 PROCEDURE — 94761 N-INVAS EAR/PLS OXIMETRY MLT: CPT

## 2023-01-07 DEVICE — SET CABLE BEADED 2MM: Type: IMPLANTABLE DEVICE | Site: HIP | Status: FUNCTIONAL

## 2023-01-07 DEVICE — BIPOLAR COMPONENT
Type: IMPLANTABLE DEVICE | Site: HIP | Status: FUNCTIONAL
Brand: UHR

## 2023-01-07 DEVICE — CERAMIC V40 FEMORAL HEAD
Type: IMPLANTABLE DEVICE | Site: HIP | Status: FUNCTIONAL
Brand: BIOLOX

## 2023-01-07 DEVICE — IMPLANTABLE DEVICE: Type: IMPLANTABLE DEVICE | Site: HIP | Status: FUNCTIONAL

## 2023-01-07 DEVICE — 132 DEGREE NECK ANGLE V40 HIP STEM
Type: IMPLANTABLE DEVICE | Site: HIP | Status: FUNCTIONAL
Brand: SECUR-FIT

## 2023-01-07 RX ORDER — HYDROMORPHONE HYDROCHLORIDE 2 MG/ML
0.4 INJECTION, SOLUTION INTRAMUSCULAR; INTRAVENOUS; SUBCUTANEOUS EVERY 5 MIN PRN
Status: CANCELLED | OUTPATIENT
Start: 2023-01-07

## 2023-01-07 RX ORDER — AMLODIPINE BESYLATE 2.5 MG/1
2.5 TABLET ORAL DAILY
Status: DISCONTINUED | OUTPATIENT
Start: 2023-01-08 | End: 2023-01-14

## 2023-01-07 RX ORDER — ONDANSETRON 2 MG/ML
4 INJECTION INTRAMUSCULAR; INTRAVENOUS DAILY PRN
Status: CANCELLED | OUTPATIENT
Start: 2023-01-07

## 2023-01-07 RX ORDER — ROCURONIUM BROMIDE 10 MG/ML
INJECTION, SOLUTION INTRAVENOUS
Status: DISCONTINUED | OUTPATIENT
Start: 2023-01-07 | End: 2023-01-07

## 2023-01-07 RX ORDER — LABETALOL HYDROCHLORIDE 5 MG/ML
INJECTION, SOLUTION INTRAVENOUS
Status: COMPLETED
Start: 2023-01-07 | End: 2023-01-07

## 2023-01-07 RX ORDER — FENTANYL CITRATE 50 UG/ML
25 INJECTION, SOLUTION INTRAMUSCULAR; INTRAVENOUS EVERY 5 MIN PRN
Status: DISCONTINUED | OUTPATIENT
Start: 2023-01-07 | End: 2023-01-18 | Stop reason: HOSPADM

## 2023-01-07 RX ORDER — HYDROMORPHONE HYDROCHLORIDE 2 MG/ML
0.2 INJECTION, SOLUTION INTRAMUSCULAR; INTRAVENOUS; SUBCUTANEOUS EVERY 5 MIN PRN
Status: CANCELLED | OUTPATIENT
Start: 2023-01-07

## 2023-01-07 RX ORDER — MIDAZOLAM HYDROCHLORIDE 1 MG/ML
1 INJECTION INTRAMUSCULAR; INTRAVENOUS ONCE AS NEEDED
Status: DISCONTINUED | OUTPATIENT
Start: 2023-01-07 | End: 2023-01-18 | Stop reason: HOSPADM

## 2023-01-07 RX ORDER — IPRATROPIUM BROMIDE AND ALBUTEROL SULFATE 2.5; .5 MG/3ML; MG/3ML
3 SOLUTION RESPIRATORY (INHALATION)
Status: CANCELLED | OUTPATIENT
Start: 2023-01-07

## 2023-01-07 RX ORDER — DEXTROSE MONOHYDRATE AND SODIUM CHLORIDE 5; .45 G/100ML; G/100ML
INJECTION, SOLUTION INTRAVENOUS CONTINUOUS
Status: DISCONTINUED | OUTPATIENT
Start: 2023-01-07 | End: 2023-01-08

## 2023-01-07 RX ORDER — SENNOSIDES 8.6 MG/1
8.6 TABLET ORAL DAILY PRN
Status: DISCONTINUED | OUTPATIENT
Start: 2023-01-07 | End: 2023-01-18 | Stop reason: HOSPADM

## 2023-01-07 RX ORDER — ACETAMINOPHEN 10 MG/ML
INJECTION, SOLUTION INTRAVENOUS
Status: DISCONTINUED | OUTPATIENT
Start: 2023-01-07 | End: 2023-01-07

## 2023-01-07 RX ORDER — DIPHENHYDRAMINE HCL 25 MG
25 CAPSULE ORAL EVERY 4 HOURS PRN
Status: DISCONTINUED | OUTPATIENT
Start: 2023-01-07 | End: 2023-01-18 | Stop reason: HOSPADM

## 2023-01-07 RX ORDER — SODIUM CHLORIDE, SODIUM GLUCONATE, SODIUM ACETATE, POTASSIUM CHLORIDE AND MAGNESIUM CHLORIDE 30; 37; 368; 526; 502 MG/100ML; MG/100ML; MG/100ML; MG/100ML; MG/100ML
INJECTION, SOLUTION INTRAVENOUS CONTINUOUS
Status: DISCONTINUED | OUTPATIENT
Start: 2023-01-07 | End: 2023-01-07

## 2023-01-07 RX ORDER — EPINEPHRINE 1 MG/ML
INJECTION, SOLUTION, CONCENTRATE INTRAVENOUS
Status: DISCONTINUED | OUTPATIENT
Start: 2023-01-07 | End: 2023-01-07 | Stop reason: HOSPADM

## 2023-01-07 RX ORDER — KETOROLAC TROMETHAMINE 30 MG/ML
INJECTION, SOLUTION INTRAMUSCULAR; INTRAVENOUS
Status: DISCONTINUED | OUTPATIENT
Start: 2023-01-07 | End: 2023-01-07 | Stop reason: HOSPADM

## 2023-01-07 RX ORDER — CLINDAMYCIN PHOSPHATE 150 MG/ML
INJECTION, SOLUTION INTRAVENOUS
Status: DISPENSED
Start: 2023-01-07 | End: 2023-01-08

## 2023-01-07 RX ORDER — KETOROLAC TROMETHAMINE 30 MG/ML
INJECTION, SOLUTION INTRAMUSCULAR; INTRAVENOUS
Status: DISCONTINUED | OUTPATIENT
Start: 2023-01-07 | End: 2023-01-07

## 2023-01-07 RX ORDER — ONDANSETRON 4 MG/1
8 TABLET, ORALLY DISINTEGRATING ORAL EVERY 6 HOURS PRN
Status: DISCONTINUED | OUTPATIENT
Start: 2023-01-07 | End: 2023-01-18 | Stop reason: HOSPADM

## 2023-01-07 RX ORDER — METHOCARBAMOL 750 MG/1
750 TABLET, FILM COATED ORAL 3 TIMES DAILY
Status: DISCONTINUED | OUTPATIENT
Start: 2023-01-07 | End: 2023-01-18 | Stop reason: HOSPADM

## 2023-01-07 RX ORDER — CLINDAMYCIN PHOSPHATE 900 MG/50ML
900 INJECTION, SOLUTION INTRAVENOUS
Status: DISPENSED | OUTPATIENT
Start: 2023-01-07 | End: 2023-01-08

## 2023-01-07 RX ORDER — PROPOFOL 10 MG/ML
VIAL (ML) INTRAVENOUS
Status: DISCONTINUED | OUTPATIENT
Start: 2023-01-07 | End: 2023-01-07

## 2023-01-07 RX ORDER — PHENYLEPHRINE HYDROCHLORIDE 10 MG/ML
INJECTION INTRAVENOUS
Status: DISCONTINUED | OUTPATIENT
Start: 2023-01-07 | End: 2023-01-07

## 2023-01-07 RX ORDER — LIDOCAINE HYDROCHLORIDE 10 MG/ML
1 INJECTION, SOLUTION EPIDURAL; INFILTRATION; INTRACAUDAL; PERINEURAL ONCE
Status: DISCONTINUED | OUTPATIENT
Start: 2023-01-07 | End: 2023-01-18 | Stop reason: HOSPADM

## 2023-01-07 RX ORDER — ONDANSETRON 2 MG/ML
INJECTION INTRAMUSCULAR; INTRAVENOUS
Status: DISCONTINUED | OUTPATIENT
Start: 2023-01-07 | End: 2023-01-07

## 2023-01-07 RX ORDER — SODIUM CHLORIDE 9 MG/ML
INJECTION, SOLUTION INTRAVENOUS
Status: COMPLETED | OUTPATIENT
Start: 2023-01-07 | End: 2023-01-07

## 2023-01-07 RX ORDER — BACITRACIN 500 [USP'U]/G
OINTMENT TOPICAL
Status: DISCONTINUED | OUTPATIENT
Start: 2023-01-07 | End: 2023-01-07 | Stop reason: HOSPADM

## 2023-01-07 RX ORDER — METHOCARBAMOL 100 MG/ML
INJECTION, SOLUTION INTRAMUSCULAR; INTRAVENOUS
Status: COMPLETED
Start: 2023-01-07 | End: 2023-01-07

## 2023-01-07 RX ORDER — MEPERIDINE HYDROCHLORIDE 25 MG/ML
12.5 INJECTION INTRAMUSCULAR; INTRAVENOUS; SUBCUTANEOUS EVERY 10 MIN PRN
Status: CANCELLED | OUTPATIENT
Start: 2023-01-07 | End: 2023-01-08

## 2023-01-07 RX ORDER — ROPIVACAINE HYDROCHLORIDE 5 MG/ML
INJECTION, SOLUTION EPIDURAL; INFILTRATION; PERINEURAL
Status: DISCONTINUED | OUTPATIENT
Start: 2023-01-07 | End: 2023-01-07 | Stop reason: HOSPADM

## 2023-01-07 RX ORDER — FENTANYL CITRATE 50 UG/ML
INJECTION, SOLUTION INTRAMUSCULAR; INTRAVENOUS
Status: COMPLETED
Start: 2023-01-07 | End: 2023-01-07

## 2023-01-07 RX ORDER — LIDOCAINE HYDROCHLORIDE 20 MG/ML
INJECTION, SOLUTION EPIDURAL; INFILTRATION; INTRACAUDAL; PERINEURAL
Status: DISCONTINUED | OUTPATIENT
Start: 2023-01-07 | End: 2023-01-07

## 2023-01-07 RX ORDER — PROCHLORPERAZINE EDISYLATE 5 MG/ML
5 INJECTION INTRAMUSCULAR; INTRAVENOUS EVERY 30 MIN PRN
Status: CANCELLED | OUTPATIENT
Start: 2023-01-07

## 2023-01-07 RX ORDER — OXYCODONE AND ACETAMINOPHEN 10; 325 MG/1; MG/1
1 TABLET ORAL EVERY 4 HOURS PRN
Status: DISCONTINUED | OUTPATIENT
Start: 2023-01-07 | End: 2023-01-18 | Stop reason: HOSPADM

## 2023-01-07 RX ORDER — ENOXAPARIN SODIUM 100 MG/ML
40 INJECTION SUBCUTANEOUS EVERY 24 HOURS
Status: DISCONTINUED | OUTPATIENT
Start: 2023-01-07 | End: 2023-01-18 | Stop reason: HOSPADM

## 2023-01-07 RX ORDER — OXYCODONE AND ACETAMINOPHEN 5; 325 MG/1; MG/1
1 TABLET ORAL EVERY 4 HOURS PRN
Status: DISCONTINUED | OUTPATIENT
Start: 2023-01-07 | End: 2023-01-09

## 2023-01-07 RX ORDER — VANCOMYCIN HYDROCHLORIDE 1 G/20ML
INJECTION, POWDER, LYOPHILIZED, FOR SOLUTION INTRAVENOUS
Status: DISCONTINUED | OUTPATIENT
Start: 2023-01-07 | End: 2023-01-07 | Stop reason: HOSPADM

## 2023-01-07 RX ORDER — MORPHINE SULFATE 10 MG/ML
INJECTION INTRAMUSCULAR; INTRAVENOUS; SUBCUTANEOUS
Status: DISCONTINUED | OUTPATIENT
Start: 2023-01-07 | End: 2023-01-07 | Stop reason: HOSPADM

## 2023-01-07 RX ORDER — MIDAZOLAM HYDROCHLORIDE 1 MG/ML
INJECTION INTRAMUSCULAR; INTRAVENOUS
Status: DISCONTINUED | OUTPATIENT
Start: 2023-01-07 | End: 2023-01-07

## 2023-01-07 RX ORDER — DEXAMETHASONE SODIUM PHOSPHATE 4 MG/ML
INJECTION, SOLUTION INTRA-ARTICULAR; INTRALESIONAL; INTRAMUSCULAR; INTRAVENOUS; SOFT TISSUE
Status: DISCONTINUED | OUTPATIENT
Start: 2023-01-07 | End: 2023-01-07

## 2023-01-07 RX ORDER — FENTANYL CITRATE 50 UG/ML
INJECTION, SOLUTION INTRAMUSCULAR; INTRAVENOUS
Status: DISCONTINUED | OUTPATIENT
Start: 2023-01-07 | End: 2023-01-07

## 2023-01-07 RX ORDER — CLINDAMYCIN PHOSPHATE 900 MG/50ML
INJECTION, SOLUTION INTRAVENOUS
Status: DISCONTINUED | OUTPATIENT
Start: 2023-01-07 | End: 2023-01-07

## 2023-01-07 RX ORDER — HYDROCODONE BITARTRATE AND ACETAMINOPHEN 500; 5 MG/1; MG/1
TABLET ORAL
Status: DISCONTINUED | OUTPATIENT
Start: 2023-01-07 | End: 2023-01-18 | Stop reason: HOSPADM

## 2023-01-07 RX ORDER — MORPHINE SULFATE 4 MG/ML
4 INJECTION, SOLUTION INTRAMUSCULAR; INTRAVENOUS EVERY 6 HOURS PRN
Status: DISCONTINUED | OUTPATIENT
Start: 2023-01-07 | End: 2023-01-18 | Stop reason: HOSPADM

## 2023-01-07 RX ADMIN — Medication 6 MG: at 10:01

## 2023-01-07 RX ADMIN — SENNOSIDES 8.6 MG: 8.6 TABLET, FILM COATED ORAL at 10:01

## 2023-01-07 RX ADMIN — MORPHINE SULFATE 2 MG: 4 INJECTION INTRAVENOUS at 02:01

## 2023-01-07 RX ADMIN — FENTANYL CITRATE 100 MCG: 50 INJECTION, SOLUTION INTRAMUSCULAR; INTRAVENOUS at 02:01

## 2023-01-07 RX ADMIN — METHOCARBAMOL TABLETS 750 MG: 750 TABLET, COATED ORAL at 10:01

## 2023-01-07 RX ADMIN — SUGAMMADEX 200 MG: 100 INJECTION, SOLUTION INTRAVENOUS at 02:01

## 2023-01-07 RX ADMIN — LABETALOL HYDROCHLORIDE 10 MG: 5 INJECTION, SOLUTION INTRAVENOUS at 03:01

## 2023-01-07 RX ADMIN — FENTANYL CITRATE 25 MCG: 50 INJECTION, SOLUTION INTRAMUSCULAR; INTRAVENOUS at 12:01

## 2023-01-07 RX ADMIN — MORPHINE SULFATE 2 MG: 4 INJECTION INTRAVENOUS at 07:01

## 2023-01-07 RX ADMIN — MEMANTINE 10 MG: 5 TABLET ORAL at 10:01

## 2023-01-07 RX ADMIN — DEXTROSE AND SODIUM CHLORIDE: 5; 450 INJECTION, SOLUTION INTRAVENOUS at 06:01

## 2023-01-07 RX ADMIN — DULOXETINE 60 MG: 30 CAPSULE, DELAYED RELEASE ORAL at 10:01

## 2023-01-07 RX ADMIN — QUETIAPINE FUMARATE 25 MG: 25 TABLET ORAL at 10:01

## 2023-01-07 RX ADMIN — METHOCARBAMOL 750 MG: 100 INJECTION INTRAMUSCULAR; INTRAVENOUS at 03:01

## 2023-01-07 RX ADMIN — LIDOCAINE HYDROCHLORIDE 50 MG: 20 INJECTION, SOLUTION EPIDURAL; INFILTRATION; INTRACAUDAL; PERINEURAL at 01:01

## 2023-01-07 RX ADMIN — MORPHINE SULFATE 4 MG: 4 INJECTION INTRAVENOUS at 06:01

## 2023-01-07 RX ADMIN — DIPHENHYDRAMINE HYDROCHLORIDE 25 MG: 25 CAPSULE ORAL at 10:01

## 2023-01-07 RX ADMIN — ROCURONIUM BROMIDE 50 MG: 10 INJECTION, SOLUTION INTRAVENOUS at 01:01

## 2023-01-07 RX ADMIN — PHENYLEPHRINE HYDROCHLORIDE 100 MCG: 10 INJECTION INTRAVENOUS at 01:01

## 2023-01-07 RX ADMIN — ENOXAPARIN SODIUM 40 MG: 40 INJECTION SUBCUTANEOUS at 04:01

## 2023-01-07 RX ADMIN — SODIUM CHLORIDE, SODIUM GLUCONATE, SODIUM ACETATE, POTASSIUM CHLORIDE AND MAGNESIUM CHLORIDE: 526; 502; 368; 37; 30 INJECTION, SOLUTION INTRAVENOUS at 12:01

## 2023-01-07 RX ADMIN — MIDAZOLAM HYDROCHLORIDE 2 MG: 1 INJECTION, SOLUTION INTRAMUSCULAR; INTRAVENOUS at 12:01

## 2023-01-07 RX ADMIN — OXYCODONE HYDROCHLORIDE AND ACETAMINOPHEN 1 TABLET: 5; 325 TABLET ORAL at 10:01

## 2023-01-07 RX ADMIN — DEXAMETHASONE SODIUM PHOSPHATE 4 MG: 4 INJECTION, SOLUTION INTRA-ARTICULAR; INTRALESIONAL; INTRAMUSCULAR; INTRAVENOUS; SOFT TISSUE at 02:01

## 2023-01-07 RX ADMIN — ONDANSETRON 4 MG: 2 INJECTION INTRAMUSCULAR; INTRAVENOUS at 02:01

## 2023-01-07 RX ADMIN — PROPOFOL 120 MG: 10 INJECTION, EMULSION INTRAVENOUS at 01:01

## 2023-01-07 RX ADMIN — ACETAMINOPHEN 1000 MG: 10 INJECTION, SOLUTION INTRAVENOUS at 02:01

## 2023-01-07 RX ADMIN — CLINDAMYCIN PHOSPHATE 600 MG: 900 INJECTION, SOLUTION INTRAVENOUS at 01:01

## 2023-01-07 RX ADMIN — FENTANYL CITRATE 100 MCG: 50 INJECTION, SOLUTION INTRAMUSCULAR; INTRAVENOUS at 01:01

## 2023-01-07 RX ADMIN — KETOROLAC TROMETHAMINE 30 MG: 30 INJECTION, SOLUTION INTRAMUSCULAR at 02:01

## 2023-01-07 NOTE — PROGRESS NOTES
Subjective:  The patient has no complaints.  She is a few hours postop.  The family member with her complaint of the patient's dizziness that was her presenting symptom.  An off-balance sensation that cause her to fall.  She would veer to the right.  This is a new phenomena.  Also the high blood pressure is a new issue.  It has been intermittently elevated.    There is concerned that her urine output has been quite low overnight.    Objective:  Vital signs are stable.  Most recent blood pressure readings 121/75.  However in the room the pressure was a bit lower.  Heart rate 84.  Respiratory rate 18.  O2 sat 99%.    General appearance is that of an elderly woman in no acute distress.  She is certainly confused and her speech is somewhat tangential at best.    Heart has a regular rate and rhythm.  Lungs clear.  Abdomen nontender.  Extremities without clubbing cyanosis or edema.    Laboratory studies include a stable CBC with H&H of 10 and 33.  White count 7500.  Electrolytes fine.  BUN creatinine normal.     Assessment:  1.  Recent complaints of dizziness.  Associated with high blood pressure.  Suspect vertigo causing her blood pressure to elevate.    2. Recent fall with hip fracture.  Status post surgical repair today.    3. Labile blood pressure readings.  No history of hypertension.    4. Reduced urine output over the past 24 hours    5. History of dementia     Plan:  She will receive IV fluid overnight.  Will do a bladder scan and if necessary leave a Salgado instead of the pure week device.  Update blood work in the morning.  We will consider the possibility of doing a 24 urine for pheochromocytoma.

## 2023-01-07 NOTE — TRANSFER OF CARE
"Anesthesia Transfer of Care Note    Patient: Gisele Meraz    Procedure(s) Performed: Procedure(s) (LRB):  HEMIARTHROPLASTY, HIP (Left)    Patient location: PACU    Anesthesia Type: general    Transport from OR: Transported from OR on room air with adequate spontaneous ventilation    Post pain: adequate analgesia    Post assessment: no apparent anesthetic complications    Post vital signs: stable    Level of consciousness: sedated    Nausea/Vomiting: no nausea/vomiting    Complications: none    Transfer of care protocol was followed      Last vitals:   Visit Vitals  BP (!) 165/85 (BP Location: Left arm, Patient Position: Lying)   Pulse 85   Temp 36.7 °C (98 °F) (Oral)   Resp 18   Ht 5' 4" (1.626 m)   Wt 74.8 kg (165 lb)   SpO2 (!) 91%   BMI 28.32 kg/m²     "

## 2023-01-07 NOTE — PROGRESS NOTES
"[facility] 3rd Floor Medical Telemetry  Orthopedics  Progress Note    Patient Name: Gisele Meraz  MRN: [id number]  Admission Date: 1/4/2023  Hospital Length of Stay: 3 days  Attending Provider: Cindy Smith MD  Primary Care Provider: Cindy Almaguer MD  Follow-up For: Procedure(s) (LRB):  HEMIARTHROPLASTY, HIP (Left)    Post-Operative Day: Day of Surgery  Subjective:     Principal Problem:<principal problem not specified>    Principal Orthopedic Problem: Day of Surgery       Interval History:  Patient has left hip pain still.  She has a left femoral neck fracture.  NPO over midnight.  Dull achy left hip pain worse with range of motion better rest.    Review of patient's allergies indicates:   Allergen Reactions    Penicillin Hives    Penicillins Swelling       Current Facility-Administered Medications   Medication    acetaminophen tablet 650 mg    amLODIPine tablet 5 mg    donepeziL tablet 10 mg    DULoxetine DR capsule 60 mg    hydrALAZINE injection 5 mg    HYDROcodone-acetaminophen 5-325 mg per tablet 1 tablet    leflunomide tablet 20 mg    melatonin tablet 6 mg    memantine tablet 10 mg    morphine injection 2 mg    ondansetron disintegrating tablet 8 mg    pantoprazole EC tablet 40 mg    predniSONE tablet 5 mg    QUEtiapine tablet 25 mg    sodium chloride 0.9% flush 10 mL     Objective:     Vital Signs (Most Recent):  Temp: 98.7 °F (37.1 °C) (01/07/23 0405)  Pulse: 89 (01/07/23 0405)  Resp: 19 (01/07/23 0405)  BP: (!) 145/84 (01/07/23 0405)  SpO2: 95 % (01/07/23 0405)   Vital Signs (24h Range):  Temp:  [98.1 °F (36.7 °C)-99.1 °F (37.3 °C)] 98.7 °F (37.1 °C)  Pulse:  [86-93] 89  Resp:  [18-20] 19  SpO2:  [90 %-95 %] 95 %  BP: (109-153)/(67-90) 145/84     Weight: 74.8 kg (165 lb)  Height: 5' 4" (162.6 cm)  Body mass index is 28.32 kg/m².      Intake/Output Summary (Last 24 hours) at 1/7/2023 0709  Last data filed at 1/7/2023 0449  Gross per 24 hour   Intake 420 ml   Output 250 ml "   Net 170 ml       Physical Exam:     General the patient is alertno acute distress nontoxic-appearing appropriate affect.    Constitutional: Vital signs are examined and stable.  Resp: No signs of labored breathing               LLE: -Skin: painful log roll No signs of new abrasions or lacerations, no scars           -MSK: EHL/FHL, Gastroc/Tib anterior Strength 5/5           -Neuro:  Sensation intact to light touch L3-S1 dermatomes           -Lymphatic: No signs of lymphadenopathy           -CV: Capillary refill is less than 2 seconds. DP/PT pulses 2/4. Compartments soft and compressible                          Diagnostic Findings:   Significant Labs:   Recent Lab Results  (Last 5 results in the past 72 hours)        01/07/23  0333   01/06/23  0413   01/05/23  0454   01/04/23  1535   01/04/23  1413        Albumin/Globulin Ratio     1.3           Albumin     3.5           Alkaline Phosphatase     201           ALT     26           Anion Gap 7.0   7.0             Appearance, UA       Clear         aPTT         24.3  Comment: For Minimal Heparin Infusion, the goal aPTT 64-85 seconds corresponds to an anti-Xa of 0.3-0.5.    For Low Intensity and High Intensity Heparin, the goal aPTT  seconds corresponds to an anti-Xa of 0.3-0.7       AST     23           Bacteria, UA       None Seen         Baso # 0.04   0.02   0.05           Basophil % 0.5   0.3   0.5           BILIRUBIN TOTAL     0.5           Bilirubin, UA       Negative         BUN 12.8   14.2   15.5           BUN/CREAT RATIO 15   15             Calcium 8.2   8.2   9.0           Chloride 107   106   108           CO2 26   26   27           Color, UA       Yellow         Creatinine 0.83   0.92   1.01           eGFR 77  Comment:                      EGFR INTERPRETATION    Beginning 8/15/22 we are reporting the eGFRcr calculation as recommended by the National Kid[employer]. The eGFRcr equation has similar overall performance characteristics to the older  equation, but the values may differ by more than 10% particularly at higher values of eGFRcr and younger adult ages.    NKF stages of chronic kidney disease (CKD)  Stage 1: Kidney damage with normal or increased eGFR (>90 mL/min/1.73 m^2)  Stage 2: Mild reduction in GFR (60-89 mL/min/1.73 m^2)  Stage 3a: Moderate reduction in GFR (45-59 mL/min/1.73 m^2)  Stage 3b: Moderate reduction in GFR (30-44 mL/min/1.73 m^2)  Stage 4: Severe reduction in GFR (15-29 mL/min/1.73 m^2)  Stage 5: Kidney failure (GFR <15 mL/min/1.73 m^2)       68  Comment:                      EGFR INTERPRETATION    Beginning 8/15/22 we are reporting the eGFRcr calculation as recommended by the National Kid[employer] Foundation. The eGFRcr equation has similar overall performance characteristics to the older equation, but the values may differ by more than 10% particularly at higher values of eGFRcr and younger adult ages.    NKF stages of chronic kidney disease (CKD)  Stage 1: Kidney damage with normal or increased eGFR (>90 mL/min/1.73 m^2)  Stage 2: Mild reduction in GFR (60-89 mL/min/1.73 m^2)  Stage 3a: Moderate reduction in GFR (45-59 mL/min/1.73 m^2)  Stage 3b: Moderate reduction in GFR (30-44 mL/min/1.73 m^2)  Stage 4: Severe reduction in GFR (15-29 mL/min/1.73 m^2)  Stage 5: Kidney failure (GFR <15 mL/min/1.73 m^2)       61           Eos # 0.19   0.11   0.14           Eosinophil % 2.5   1.5   1.4           Globulin, Total     2.6           Glucose 105   117   90           Glucose, UA       Negative         Hematocrit 33.1   33.2   37.1           Hemoglobin 10.4   10.7   11.5           Immature Grans (Abs) 0.04   0.02   0.05           Immature Granulocytes 0.5   0.3   0.5           INR         0.86       Ketones, UA       Negative         Leukocytes, UA       Negative         Lymph # 1.63   1.07   2.41           LYMPH % 21.8   14.9   23.9           MCH 29.8   29.6   29.3           MCHC 31.4   32.2   31.0           MCV 94.8   92.0   94.6            Mono # 0.75   0.43   0.72           Mono % 10.0   6.0   7.1           MPV 11.6   11.3   11.3           Neut # 4.82   5.55   6.73           Neut % 64.7   77.0   66.6           NITRITE UA       Negative         nRBC 0.0   0.0   0.0           Occult Blood UA       Negative         pH, UA       6.0         Platelets 167   179   246           Potassium 3.9   3.8   4.4           PROTEIN TOTAL     6.1           Protein, UA       Negative         Protime         11.7       RBC 3.49   3.61   3.92           RBC, UA       <5         RDW 13.1   13.1   12.9           Sodium 140   139   146           Specific Gravity,UA       1.009         Squam Epithel, UA       <5         Urobilinogen, UA       0.2         WBC, UA       <5         WBC 7.5   7.2   10.1                                   Significant Imaging: I have reviewed all pertinent imaging results/findings.    Assessment/Plan:     Active Diagnoses:    Diagnosis Date Noted POA    Left hip pain [M25.552] 01/06/2023 No    Hypertension [I10] 01/05/2023 Yes    Ataxic gait [R26.0] 01/05/2023 Yes    Fibromyalgia [M79.7] 05/16/2022 Yes    Rheumatoid arthritis [M06.9] 05/16/2022 Yes    Early onset Alzheimer's disease with behavioral disturbance [G30.0, F02.818] 03/29/2021 Yes      Problems Resolved During this Admission:       Patient has been NPO over midnight.  Planning for surgery today on her left femoral neck fracture.  Plan for hip hemiarthroplasty.  Patient has advancing to Alzheimer's.  Family was agreeable yesterday for surgery agreeable today.  Understand risks and benefits with this procedure.    I explained that surgery and the nature of their condition are not without risks. These include, but are not limited to, bleeding, infection, neurovascular compromise, malunion, nonunion, hardware complications, wound complications, scarring, cosmetic defects, need for later and/or repeated surgeries, pain, loss of ROM, loss of function, PTOA, deformity, stance/gait and/or  functional abnormalities, thromboembolic complications, compartment syndrome, loss of limb, loss of life, anesthetic complications, and other imponderables. I explained that these can occur despite the adequacy of treatments rendered, and that their risks are heightened given the nature of their condition. They verbalized understanding. They would like to continue with surgery at this time. If appropriate family was involved with surgical discussion.       This note/OR report was created with the assistance of  voice recognition software or phone  dictation.  There may be transcription errors as a result of using this technology however minimal. Effort has been made to assure accuracy of transcription but any obvious errors or omissions should be clarified with the author of the document.           Rodrick Andrew, DO  Orthopedic Trauma Surgery  [facility]

## 2023-01-07 NOTE — OP NOTE
OPERATIVE REPORT    Patient: Gisele Meraz   : 1955    MRN: [id number]  Date: 2023      Surgeon:Rodrick Andrew DO  Assistant: Naveen Rudd was essential, part of the procedure including deep hardware placement and deep closure.  No senior assistant was availible  Preoperative Diagnosis:  Left femoral neck fracture  Postoperative Diagnosis: Same  Procedure:  Left Hip Hemiarthroplasty CPT 43900  Anesthesiologist: Stephen Montaño Jr., MD  OR Staff: Circulator: Yolis Kiran RN  Relief Scrub: ST Laly  Scrub Person: Hermilo Patel  Implants:   Implant Name Type Inv. Item Serial No.  Lot No. LRB No. Used Action   DEVICE CLSR PDO 1CT-1SPRL 12IN - [id number]  DEVICE CLSR PDO 1CT-1SPRL 12IN  [employer] [id number] Left 1 Wasted   SET CABLE BEADED 2MM - [id number]  SET CABLE BEADED 2MM  [employer] [id number] Left 1 Implanted   STEM HIP SECURFIT SZ 7 132 DEG - [id number]  STEM HIP SECURFIT SZ 7 132 DEG  [employer] [id number] Left 1 Implanted   HEAD UNIVERSAL SYS 45MM X 28MM - [id number]  HEAD UNIVERSAL SYS 45MM X 28MM  [employer] [id number] Left 1 Implanted   HEAD V40 BIOLOX TAPR 28MM 0 - [id number]  HEAD V40 BIOLOX TAPR 28MM 0  [employer] [id number] Left 1 Implanted     EBL: 50cc  Complications: None  Disposition: To PACU, stable    Indications: Gisele Meraz is a 67 y.o. female presenting with the aforementioned injuries/findings. The procedure is indicated to best obtain hip stability to allow early mobilization while decreasing fracture pain.  The patient is suffering from dementia. After thorough discussion of the risks, benefits, expected outcomes, and alternatives to surgical intervention, the patient's family agreed to proceed with surgical treatment.  Specific risks discussed included, but were not limited to: superficial or deep infection, wound healing complications, DVT/PE, significant bleeding requiring transfusion, damage to named anatomic  structures in the immediate area including named neurovascular structures, dislocation, iatrogenic or delayed fracture, and general risks of anesthesia.  The patient voiced understanding and written as well as verbal consent was obtained by myself prior to the procedure.    Procedure Note:  The patient was brought back to the OR and placed supine on the OR table. After successful induction of anesthesia by anesthesia staff, the patient was positioned in the lateral decubitus position and all bony prominences were padded appropriately; an axillary roll was also utilized. The surgical field was then provisionally cleansed and then prepped and draped in the usual sterile fashion.    At this time a time-out was performed, with the correct patient, site, and procedure identified.  The universal time out as well as sign your site protocols were followed.  Preoperative antibiotics were verified as administered.     We performed a Garcia-Swain approach to the femoral neck in the usual manner, and we came upon the femoral neck fracture without difficulty.  The area was irrigated and the femoral neck fracture found to be minimally comminuted and displaced.  We then completed a T-shaped capsulotomy was performed and the femoral head was removed without difficulty; this was sized on the back table.  We irrigated the acetabulum and removed debris.  The head size was then checked with a sizer into the acetabulum.      The proximal femur was elevated into the surgical field and a femoral neck cut was made.  Placed cable on proximal femur to protect calcar avoid neurovasc structures. The box osteotome was then used, followed by a canal finder.  We began serial broaching until a solid fit was achieved.  No femoral neck fracture propagation distally was seen.  The broach was used to trial, and we then relocated the hip; the hip was found to be stable and length appeared appropriate.  We then dislocated the trial, removed the  components, irrigated the surgical site using a copious amount of sterile saline, and then placed our definitive implants into position.  The hip was relocated and ROM was found to be excellent, without dislocation.  The tagged tendons were repaired, and the incisions were then irrigated using copious sterile saline and then closed in layered fashion.  The surgical sites were infiltrated using local anesthetic, and the leg was sterilely cleansed and dressed.  The patient was placed back into a supine position.    The patient was then subsequently extubated and transferred to to PACU in a stable condition.    All sponge and needle counts were correct at the end of the case.  I was present and participated in all aspects of the procedure.    Prognosis:  The patient will be kept WBAT on the ipsilateral extremity with 6 weeks without external or internal rotation of the hip.  DVT prophylaxis is indicated for this patient and procedure.  The patient is at risk of infection, pain, instability, and more, and this was discussed with the patient postoperatively.        This note/OR report was created with the assistance of  voice recognition software or phone  dictation.  There may be transcription errors as a result of using this technology however minimal. Effort has been made to assure accuracy of transcription but any obvious errors or omissions should be clarified with the author of the document.       Rodrick nAdrew, DO  Orthopedic Trauma Surgery

## 2023-01-07 NOTE — ANESTHESIA PROCEDURE NOTES
Intubation    Date/Time: 1/7/2023 1:07 PM  Performed by: Barrera Helton CRNA  Authorized by: Stephen Montaño Jr., MD     Intubation:     Induction:  Intravenous    Intubated:  Postinduction    Mask Ventilation:  Easy with oral airway    Attempts:  1    Attempted By:  CRNA    Method of Intubation:  Direct    Blade:  Shook 2    Laryngeal View Grade: Grade IIA - cords partially seen      Difficult Airway Encountered?: No      Complications:  None    Airway Device:  Oral endotracheal tube    Airway Device Size:  7.5    Style/Cuff Inflation:  Cuffed (inflated to minimal occlusive pressure)    Inflation Amount (mL):  6    Tube secured:  21    Secured at:  The lips    Placement Verified By:  Capnometry    Complicating Factors:  None    Findings Post-Intubation:  BS equal bilateral and atraumatic/condition of teeth unchanged

## 2023-01-07 NOTE — PT/OT/SLP PROGRESS
Physical Therapy      Patient Name:  Gisele Meraz   MRN:  [id number]    Patient with new finding of L femoral neck fx. Ortho is planning to take pt to sx today. Will follow-up post-op as appropriate.

## 2023-01-07 NOTE — ANESTHESIA PREPROCEDURE EVALUATION
01/07/2023  Gisele Meraz is a 67 y.o., female 4th with presyncope and hypertension.  Further workup revealed UTI.  Patient had a subsequent fall in the ER with closed left femoral neck fracture.  Patient presents today for left hip hemiarthroplasty and has been medically optimized by Internal Medicine.  EKG normal sinus rhythm      Pre-op Assessment    I have reviewed the Patient Summary Reports.    I have reviewed the NPO Status.   I have reviewed the Medications.     Review of Systems  Anesthesia Hx:   Denies Personal Hx of Anesthesia complications.   Social:  Non-Smoker    Cardiovascular:   Hypertension  Functional Capacity good / => 4 METS    Hepatic/GI:   GERD    Neurological:  Dementia        Physical Exam  General: Well nourished, Cooperative, Alert and Oriented    Airway:  Mouth Opening: Normal  TM Distance: Normal  Tongue: Normal  Neck ROM: Normal ROM    Dental:  Intact    Chest/Lungs:  Clear to auscultation, Normal Respiratory Rate    Heart:  Rate: Normal  Rhythm: Regular Rhythm        Anesthesia Plan  Type of Anesthesia, risks & benefits discussed:    Anesthesia Type: Gen ETT  Intra-op Monitoring Plan: Standard ASA Monitors  Post Op Pain Control Plan: multimodal analgesia and IV/PO Opioids PRN  Induction:  IV  Airway Plan: Direct  Informed Consent: Informed consent signed with the Patient and all parties understand the risks and agree with anesthesia plan.  All questions answered.   ASA Score: 3  Day of Surgery Review of History & Physical: H&P Update referred to the surgeon/provider.    Ready For Surgery From Anesthesia Perspective.     .

## 2023-01-08 LAB
ALBUMIN SERPL-MCNC: 2.7 G/DL (ref 3.4–4.8)
ALBUMIN/GLOB SERPL: 1.1 RATIO (ref 1.1–2)
ALP SERPL-CCNC: 136 UNIT/L (ref 40–150)
ALT SERPL-CCNC: 17 UNIT/L (ref 0–55)
AST SERPL-CCNC: 17 UNIT/L (ref 5–34)
BASOPHILS # BLD AUTO: 0.04 X10(3)/MCL (ref 0–0.2)
BASOPHILS NFR BLD AUTO: 0.4 %
BILIRUBIN DIRECT+TOT PNL SERPL-MCNC: 0.6 MG/DL
BUN SERPL-MCNC: 20 MG/DL (ref 9.8–20.1)
CALCIUM SERPL-MCNC: 8 MG/DL (ref 8.4–10.2)
CHLORIDE SERPL-SCNC: 100 MMOL/L (ref 98–107)
CO2 SERPL-SCNC: 26 MMOL/L (ref 23–31)
CREAT SERPL-MCNC: 0.93 MG/DL (ref 0.55–1.02)
EOSINOPHIL # BLD AUTO: 0.01 X10(3)/MCL (ref 0–0.9)
EOSINOPHIL NFR BLD AUTO: 0.1 %
ERYTHROCYTE [DISTWIDTH] IN BLOOD BY AUTOMATED COUNT: 13 % (ref 11–14.5)
GFR SERPLBLD CREATININE-BSD FMLA CKD-EPI: 68 MLS/MIN/1.73/M2
GLOBULIN SER-MCNC: 2.4 GM/DL (ref 2.4–3.5)
GLUCOSE SERPL-MCNC: 130 MG/DL (ref 82–115)
HCT VFR BLD AUTO: 28.9 % (ref 37–47)
HGB BLD-MCNC: 8.9 GM/DL (ref 12–16)
IMM GRANULOCYTES # BLD AUTO: 0.03 X10(3)/MCL (ref 0–0.04)
IMM GRANULOCYTES NFR BLD AUTO: 0.3 %
LYMPHOCYTES # BLD AUTO: 1.01 X10(3)/MCL (ref 0.6–4.6)
LYMPHOCYTES NFR BLD AUTO: 10.9 %
MCH RBC QN AUTO: 29.4 PG
MCHC RBC AUTO-ENTMCNC: 30.8 MG/DL (ref 33–36)
MCV RBC AUTO: 95.4 FL (ref 80–94)
MONOCYTES # BLD AUTO: 1.06 X10(3)/MCL (ref 0.1–1.3)
MONOCYTES NFR BLD AUTO: 11.4 %
NEUTROPHILS # BLD AUTO: 7.15 X10(3)/MCL (ref 2.1–9.2)
NEUTROPHILS NFR BLD AUTO: 76.9 %
NRBC BLD AUTO-RTO: 0 % (ref 0–1)
PLATELET # BLD AUTO: 186 X10(3)/MCL (ref 140–371)
PMV BLD AUTO: 11.9 FL (ref 9.4–12.4)
POTASSIUM SERPL-SCNC: 4.4 MMOL/L (ref 3.5–5.1)
PROT SERPL-MCNC: 5.1 GM/DL (ref 5.8–7.6)
RBC # BLD AUTO: 3.03 X10(6)/MCL (ref 4.2–5.4)
SODIUM SERPL-SCNC: 136 MMOL/L (ref 136–145)
WBC # SPEC AUTO: 9.3 X10(3)/MCL (ref 4.5–11.5)

## 2023-01-08 PROCEDURE — 85025 COMPLETE CBC W/AUTO DIFF WBC: CPT | Performed by: PHYSICIAN ASSISTANT

## 2023-01-08 PROCEDURE — S5010 5% DEXTROSE AND 0.45% SALINE: HCPCS | Performed by: INTERNAL MEDICINE

## 2023-01-08 PROCEDURE — 36415 COLL VENOUS BLD VENIPUNCTURE: CPT | Performed by: PHYSICIAN ASSISTANT

## 2023-01-08 PROCEDURE — 25000003 PHARM REV CODE 250: Performed by: INTERNAL MEDICINE

## 2023-01-08 PROCEDURE — 25000003 PHARM REV CODE 250: Performed by: PHYSICIAN ASSISTANT

## 2023-01-08 PROCEDURE — 21400001 HC TELEMETRY ROOM

## 2023-01-08 PROCEDURE — 97164 PT RE-EVAL EST PLAN CARE: CPT

## 2023-01-08 PROCEDURE — 63600175 PHARM REV CODE 636 W HCPCS: Performed by: PHYSICIAN ASSISTANT

## 2023-01-08 PROCEDURE — 25000003 PHARM REV CODE 250: Performed by: STUDENT IN AN ORGANIZED HEALTH CARE EDUCATION/TRAINING PROGRAM

## 2023-01-08 PROCEDURE — 63600175 PHARM REV CODE 636 W HCPCS: Performed by: STUDENT IN AN ORGANIZED HEALTH CARE EDUCATION/TRAINING PROGRAM

## 2023-01-08 PROCEDURE — 99232 SBSQ HOSP IP/OBS MODERATE 35: CPT | Mod: ,,, | Performed by: INTERNAL MEDICINE

## 2023-01-08 PROCEDURE — 80053 COMPREHEN METABOLIC PANEL: CPT | Performed by: PHYSICIAN ASSISTANT

## 2023-01-08 PROCEDURE — 99232 PR SUBSEQUENT HOSPITAL CARE,LEVL II: ICD-10-PCS | Mod: ,,, | Performed by: INTERNAL MEDICINE

## 2023-01-08 RX ORDER — POLYETHYLENE GLYCOL 3350 17 G/17G
17 POWDER, FOR SOLUTION ORAL DAILY
Status: DISCONTINUED | OUTPATIENT
Start: 2023-01-08 | End: 2023-01-18 | Stop reason: HOSPADM

## 2023-01-08 RX ADMIN — CLINDAMYCIN PHOSPHATE 900 MG: 900 INJECTION, SOLUTION INTRAVENOUS at 12:01

## 2023-01-08 RX ADMIN — OXYCODONE AND ACETAMINOPHEN 1 TABLET: 10; 325 TABLET ORAL at 01:01

## 2023-01-08 RX ADMIN — Medication 6 MG: at 07:01

## 2023-01-08 RX ADMIN — DIPHENHYDRAMINE HYDROCHLORIDE 25 MG: 25 CAPSULE ORAL at 01:01

## 2023-01-08 RX ADMIN — MORPHINE SULFATE 2 MG: 4 INJECTION INTRAVENOUS at 10:01

## 2023-01-08 RX ADMIN — CLINDAMYCIN PHOSPHATE 900 MG: 900 INJECTION, SOLUTION INTRAVENOUS at 08:01

## 2023-01-08 RX ADMIN — METHOCARBAMOL TABLETS 750 MG: 750 TABLET, COATED ORAL at 07:01

## 2023-01-08 RX ADMIN — DULOXETINE 60 MG: 30 CAPSULE, DELAYED RELEASE ORAL at 07:01

## 2023-01-08 RX ADMIN — AMLODIPINE BESYLATE 2.5 MG: 2.5 TABLET ORAL at 10:01

## 2023-01-08 RX ADMIN — OXYCODONE AND ACETAMINOPHEN 1 TABLET: 10; 325 TABLET ORAL at 07:01

## 2023-01-08 RX ADMIN — DEXTROSE AND SODIUM CHLORIDE: 5; 450 INJECTION, SOLUTION INTRAVENOUS at 04:01

## 2023-01-08 RX ADMIN — DEXTROSE AND SODIUM CHLORIDE: 5; 450 INJECTION, SOLUTION INTRAVENOUS at 02:01

## 2023-01-08 RX ADMIN — METHOCARBAMOL TABLETS 750 MG: 750 TABLET, COATED ORAL at 02:01

## 2023-01-08 RX ADMIN — QUETIAPINE FUMARATE 25 MG: 25 TABLET ORAL at 07:01

## 2023-01-08 RX ADMIN — PREDNISONE 5 MG: 5 TABLET ORAL at 08:01

## 2023-01-08 RX ADMIN — LEFLUNOMIDE 20 MG: 20 TABLET, FILM COATED ORAL at 10:01

## 2023-01-08 RX ADMIN — POLYETHYLENE GLYCOL 3350 17 G: 17 POWDER, FOR SOLUTION ORAL at 05:01

## 2023-01-08 RX ADMIN — ENOXAPARIN SODIUM 40 MG: 40 INJECTION SUBCUTANEOUS at 04:01

## 2023-01-08 RX ADMIN — PANTOPRAZOLE SODIUM 40 MG: 40 TABLET, DELAYED RELEASE ORAL at 08:01

## 2023-01-08 RX ADMIN — METHOCARBAMOL TABLETS 750 MG: 750 TABLET, COATED ORAL at 08:01

## 2023-01-08 RX ADMIN — DONEPEZIL HYDROCHLORIDE 10 MG: 5 TABLET, FILM COATED ORAL at 08:01

## 2023-01-08 RX ADMIN — MEMANTINE 10 MG: 5 TABLET ORAL at 08:01

## 2023-01-08 RX ADMIN — QUETIAPINE FUMARATE 25 MG: 25 TABLET ORAL at 08:01

## 2023-01-08 RX ADMIN — DULOXETINE 60 MG: 30 CAPSULE, DELAYED RELEASE ORAL at 08:01

## 2023-01-08 RX ADMIN — MEMANTINE 10 MG: 5 TABLET ORAL at 07:01

## 2023-01-08 NOTE — ANESTHESIA POSTPROCEDURE EVALUATION
Anesthesia Post Evaluation    Patient: Gisele Meraz    Procedure(s) Performed: Procedure(s) (LRB):  HEMIARTHROPLASTY, HIP (Left)    Final Anesthesia Type: general      Patient location during evaluation: floor  Patient participation: Yes- Able to Participate  Level of consciousness: awake and alert  Post-procedure vital signs: reviewed and stable  Pain management: adequate  Airway patency: patent    PONV status at discharge: No PONV  Anesthetic complications: no      Cardiovascular status: blood pressure returned to baseline  Respiratory status: spontaneous ventilation and nasal cannula  Hydration status: euvolemic  Follow-up not needed.          Vitals Value Taken Time   /74 01/08/23 0801   Temp 36.9 °C (98.5 °F) 01/08/23 0801   Pulse 100 01/08/23 0801   Resp 20 01/08/23 0801   SpO2 100 % 01/08/23 0801         Event Time   Out of Recovery 01/07/2023 15:45:00         Pain/Tasha Score: Pain Rating Prior to Med Admin: 8 (1/8/2023  7:02 AM)  Pain Rating Post Med Admin: 2 (1/7/2023  3:22 AM)

## 2023-01-08 NOTE — PROGRESS NOTES
Subjective:  The patient feels fine.  She has no complaints.  Her daughter states that her personality is not usually like this.  She is eating and drinking fairly well.      Objective:  She is afebrile.  Vital signs stable.  Blood pressures remain well controlled.    General appearance is that of a euphoric elderly woman in no acute distress.  Heart has a regular rate and rhythm.  Lungs clear.  Abdomen nontender.  Extremities without clubbing cyanosis or edema.  Laboratory studies show a slight drop in her H&H down to 8.9 and 28.9.  White count 9300 and platelet count 186.  Electrolytes are fine.  BUN creatinine normal.  Blood sugar 130.    Assessment:  1. Recent complaints of dizziness.  Associated with intermittent hypertension.  Seems to be improving.    2. Recent fall with hip fracture.  Stable status post surgery     3. Labile blood pressure.  Pressure okay now on low-dose antihypertensive therapy.    4. Dementia    Plan:  Will discontinue IV fluid.  Repeat blood work tomorrow.  Will hold off on checking workup for pheochromocytoma.  Will add laxative as she has not had a bowel movement in a few days.

## 2023-01-08 NOTE — PLAN OF CARE
Problem: Adult Inpatient Plan of Care  Goal: Plan of Care Review  Outcome: Ongoing, Progressing  Goal: Optimal Comfort and Wellbeing  Outcome: Ongoing, Progressing     Problem: Skin Injury Risk Increased  Goal: Skin Health and Integrity  Outcome: Ongoing, Progressing     Problem: Fall Injury Risk  Goal: Absence of Fall and Fall-Related Injury  Outcome: Ongoing, Progressing     Problem: Pain Acute  Goal: Acceptable Pain Control and Functional Ability  Outcome: Ongoing, Progressing

## 2023-01-08 NOTE — PROGRESS NOTES
[facility] 3rd Floor Medical Telemetry  Orthopedics  Progress Note    Patient Name: Gisele Meraz  MRN: [id number]  Admission Date: 1/4/2023  Hospital Length of Stay: 4 days  Attending Provider: Cindy Smith MD  Primary Care Provider: Cindy Almaguer MD  Follow-up For: Procedure(s) (LRB):  HEMIARTHROPLASTY, HIP (Left)    Post-Operative Day: 1 Day Post-Op  Subjective:     Principal Problem:<principal problem not specified>    Principal Orthopedic Problem: 1 Day Post-Op   Left hip hemiarthroplasty    Interval History: Patient with left femoral neck fx s/p hemiarthroplasty. Resting comfortably this morning although states some moderate pain with increased range of motion. Was not able to work with therapy following surgery at the time of our discussion this morning. Having some muscle spasms at the incision site. No acute complaints otherwise.    Review of patient's allergies indicates:   Allergen Reactions    Penicillin Hives    Penicillins Swelling       Current Facility-Administered Medications   Medication    0.9%  NaCl infusion (for blood administration)    acetaminophen tablet 650 mg    amLODIPine tablet 2.5 mg    clindamycin in D5W 900 mg/50 mL IVPB 900 mg    dextrose 5 % and 0.45 % NaCl infusion    diphenhydrAMINE capsule 25 mg    donepeziL tablet 10 mg    DULoxetine DR capsule 60 mg    enoxaparin injection 40 mg    fentaNYL injection 25 mcg    hydrALAZINE injection 5 mg    leflunomide tablet 20 mg    LIDOcaine (PF) 10 mg/ml (1%) injection 10 mg    melatonin tablet 6 mg    memantine tablet 10 mg    methocarbamoL tablet 750 mg    midazolam (VERSED) 1 mg/mL injection 1 mg    morphine injection 2 mg    morphine injection 4 mg    ondansetron disintegrating tablet 8 mg    ondansetron disintegrating tablet 8 mg    oxyCODONE-acetaminophen  mg per tablet 1 tablet    oxyCODONE-acetaminophen 5-325 mg per tablet 1 tablet    pantoprazole EC tablet 40 mg    predniSONE tablet 5 mg     "QUEtiapine tablet 25 mg    senna tablet 8.6 mg    sodium chloride 0.9% flush 10 mL     Objective:     Vital Signs (Most Recent):  Temp: 97.6 °F (36.4 °C) (01/08/23 1053)  Pulse: 88 (01/08/23 1053)  Resp: 20 (01/08/23 1053)  BP: 97/60 (01/08/23 1053)  SpO2: 98 % (01/08/23 1053) Vital Signs (24h Range):  Temp:  [97.5 °F (36.4 °C)-98.6 °F (37 °C)] 97.6 °F (36.4 °C)  Pulse:  [] 88  Resp:  [13-20] 20  SpO2:  [80 %-100 %] 98 %  BP: ()/(60-94) 97/60     Weight: 74.8 kg (165 lb)  Height: 5' 4" (162.6 cm)  Body mass index is 28.32 kg/m².      Intake/Output Summary (Last 24 hours) at 1/8/2023 1326  Last data filed at 1/8/2023 0630  Gross per 24 hour   Intake 1040 ml   Output 300 ml   Net 740 ml       Physical Exam:   Musculoskeletal:       Left lower extremity: Dressing is CDI without drainage. Dermabond tape in place; compartments are soft and compressible; No pain with ROM at the hip, knee, or ankle;appropriate tenderness to palpation; dorsi/plantar flexes the foot; SILT distally; BCR distally; DP pulse 2+      Diagnostic Findings:   Significant Labs:   Recent Lab Results  (Last 5 results in the past 72 hours)        01/08/23  0727   01/07/23  1145   01/07/23  1140   01/07/23  0333   01/06/23  0413        Unit Blood Type Code     5100  [P]                5100  [P]           Unit Expiration     [id number]  [P]                [id number]  [P]           Albumin/Globulin Ratio 1.1               ABO and RH   O POS             Albumin 2.7               Alkaline Phosphatase 136               ALT 17               Anion Gap       7.0   7.0       AST 17               Baso # 0.04       0.04   0.02       Basophil % 0.4       0.5   0.3       BILIRUBIN TOTAL 0.6               BUN 20.0       12.8   14.2       BUN/CREAT RATIO       15   15       Calcium 8.0       8.2   8.2       Chloride 100       107   106       CO2 26       26   26       Creatinine 0.93       0.83   0.92       CROSSMATCH INTERPRETATION     " Compatible  [P]                Compatible  [P]           DISPENSE STATUS     Selected  [P]                Selected  [P]           eGFR 68       77  Comment:                      EGFR INTERPRETATION    Beginning 8/15/22 we are reporting the eGFRcr calculation as recommended by the [employer]. The eGFRcr equation has similar overall performance characteristics to the older equation, but the values may differ by more than 10% particularly at higher values of eGFRcr and younger adult ages.    NKF stages of chronic kidney disease (CKD)  Stage 1: Kidney damage with normal or increased eGFR (>90 mL/min/1.73 m^2)  Stage 2: Mild reduction in GFR (60-89 mL/min/1.73 m^2)  Stage 3a: Moderate reduction in GFR (45-59 mL/min/1.73 m^2)  Stage 3b: Moderate reduction in GFR (30-44 mL/min/1.73 m^2)  Stage 4: Severe reduction in GFR (15-29 mL/min/1.73 m^2)  Stage 5: Kidney failure (GFR <15 mL/min/1.73 m^2)       68  Comment:                      EGFR INTERPRETATION    Beginning 8/15/22 we are reporting the eGFRcr calculation as recommended by the National [employer]. The eGFRcr equation has similar overall performance characteristics to the older equation, but the values may differ by more than 10% particularly at higher values of eGFRcr and younger adult ages.    NKF stages of chronic kidney disease (CKD)  Stage 1: Kidney damage with normal or increased eGFR (>90 mL/min/1.73 m^2)  Stage 2: Mild reduction in GFR (60-89 mL/min/1.73 m^2)  Stage 3a: Moderate reduction in GFR (45-59 mL/min/1.73 m^2)  Stage 3b: Moderate reduction in GFR (30-44 mL/min/1.73 m^2)  Stage 4: Severe reduction in GFR (15-29 mL/min/1.73 m^2)  Stage 5: Kidney failure (GFR <15 mL/min/1.73 m^2)           Eos # 0.01       0.19   0.11       Eosinophil % 0.1       2.5   1.5       Globulin, Total 2.4               Glucose 130       105   117       Group & Rh     O POS           Hematocrit 28.9       33.1   33.2       Hemoglobin 8.9       10.4    10.7       Immature Grans (Abs) 0.03       0.04   0.02       Immature Granulocytes 0.3       0.5   0.3       Indirect Fariha GEL     NEG           Lymph # 1.01       1.63   1.07       LYMPH % 10.9       21.8   14.9       MCH 29.4       29.8   29.6       MCHC 30.8       31.4   32.2       MCV 95.4       94.8   92.0       Mono # 1.06       0.75   0.43       Mono % 11.4       10.0   6.0       MPV 11.9       11.6   11.3       Neut # 7.15       4.82   5.55       Neut % 76.9       64.7   77.0       nRBC 0.0       0.0   0.0       Platelets 186       167   179       Potassium 4.4       3.9   3.8       Product Code     B3069W83  [P]                C7422S87  [P]           PROTEIN TOTAL 5.1               RBC 3.03       3.49   3.61       RDW 13.0       13.1   13.1       Sodium 136       140   139       Unit ABO/Rh     O POS  [P]                O POS  [P]           UNIT NUMBER     [id number]  [P]                [id number]  [P]           WBC 9.3       7.5   7.2                               [P] - Preliminary Result                Significant Imaging: I have reviewed and interpreted all pertinent imaging results/findings.     Assessment/Plan:     Active Diagnoses:    Diagnosis Date Noted POA    Left hip pain [M25.552] 01/06/2023 No    Hypertension [I10] 01/05/2023 Yes    Ataxic gait [R26.0] 01/05/2023 Yes    Fibromyalgia [M79.7] 05/16/2022 Yes    Rheumatoid arthritis [M06.9] 05/16/2022 Yes    Early onset Alzheimer's disease with behavioral disturbance [G30.0, F02.818] 03/29/2021 Yes      Problems Resolved During this Admission:     H&H down some this morning following surgery. We will continue to monitor for transfusion needs as indicated.   Continue multimodal pain control. Utilize robaxin, Norco, and tylenol as needed. Concerned for increased confusion with oxycodone and IV morphine.   WBAT and no internal or external rotation x 6 weeks otherwise ROMAT with abduction pillow while in bed  Daily dressing changes beginning  tomorrow. Dermabond tape below island dressing is to remain in place until follow up or x 3 weeks.   Lovenox for DVT ppx during stay followed by aspirin 81 mg bid upon discharge  Follow up with Dr. Andrew in approx 3 weeks for wound check and repeat x rays.   We will continue to monitor peripherally through her stay. Please call with questions or concerns.     The above findings, diagnostics, and treatment plan were discussed with Dr. Andrew who is in agreement with the plan of care except as stated in additional documentation.      Harriet Rudd PA-C  Orthopedic Trauma Surgery  [facility]

## 2023-01-08 NOTE — PT/OT/SLP RE-EVAL
Physical Therapy Re-evaluation    Patient Name:  Gisele Meraz   MRN:  [id number]    Recommendations:     Discharge Recommendations: rehabilitation facility  Discharge Equipment Recommendations: none   Barriers to discharge:  Lives at home alone with cameras with family living down street from residence     Assessment:     Gisele Meraz is a 67 y.o. female admitted with a medical diagnosis of L hip hemiarthroplasty.  She presents with the following impairments/functional limitations: weakness, impaired endurance, gait instability, impaired functional mobility, decreased lower extremity function, decreased safety awareness    Rehab Prognosis:  Fair; patient would benefit from acute skilled PT services to address these deficits and reach maximum level of function.      Recent Surgery: Procedure(s) (LRB):  HEMIARTHROPLASTY, HIP (Left) 1 Day Post-Op    Plan:     During this hospitalization, patient to be seen 6 x/week to address the above listed problems via gait training, therapeutic activities, therapeutic exercises, neuromuscular re-education  Plan of Care Expires:  01/07/23  Plan of Care Reviewed with: patient, other (see comments) (daughter in law)    Subjective     Communicated with RN prior to session.  Patient found HOB elevated with hip abduction pillow (roll belt) upon PT entry to room, agreeable to evaluation.      Chief Complaint: L hip pain, itching  Patient comments/goals: To go home  Pain/Comfort:  Pain Rating 1: 8/10  Location - Side 1: Left  Location 1: hip  Pain Addressed 1: Nurse notified, Reposition  Pain Rating Post-Intervention 1: 7/10    Patients cultural, spiritual, Christianity conflicts given the current situation: no      Objective:     Patient found with: hip abduction pillow (roll belt)     General Precautions: Standard, fall  Orthopedic Precautions:  (WBAT, no IR or ER of L hip)  Braces: Hip abduction brace  Respiratory Status: Nasal cannula, flow 2 L/min    Exams:  Sensation:    -        Intact  RLE ROM: WNL  RLE Strength: WNL  LLE ROM: Limited hip abduction and hip flexion, IR and ER NT per MD recs  LLE Strength: 3-/5 quadriceps, 4/5 tibialis anterior    Functional Mobility:  Bed Mobility:     Rolling Right: moderate assistance  Scooting: moderate assistance  Supine to Sit: moderate assistance  Transfers:     Sit to Stand:  maximal assistance with rolling walker  Balance: mod A with standing balance with use of walker            Patient left HOB elevated with call button in reach and RN notified.    GOALS:   Multidisciplinary Problems       Physical Therapy Goals          Problem: Physical Therapy    Goal Priority Disciplines Outcome Goal Variances Interventions   Physical Therapy Goal     PT, PT/OT Ongoing, Progressing     Description: Goals to be met by: 23     Patient will increase functional independence with mobility by performin. Supine to sit with Stand-by Assistance  2. Sit to supine with Stand-by Assistance  3. Sit to stand transfer TBD  4. Gait  TBD                         History:     Past Medical History:   Diagnosis Date    Acid reflux     Arthritis     Dementia     Patient on Memantine BID    Fibromyalgia     Pneumonia     RA (rheumatoid arthritis)        Past Surgical History:   Procedure Laterality Date    ADENOIDECTOMY       SECTION      HYSTERECTOMY      TONSILLECTOMY         Time Tracking:     PT Received On: 23  PT Start Time: 1330     PT Stop Time: 1409  PT Total Time (min): 39 min     Billable Minutes: Re-eval 39  mins      2023

## 2023-01-09 PROBLEM — S72.002A CLOSED LEFT HIP FRACTURE: Status: ACTIVE | Noted: 2023-01-06

## 2023-01-09 PROBLEM — D64.9 ANEMIA: Status: ACTIVE | Noted: 2023-01-09

## 2023-01-09 LAB
ALBUMIN SERPL-MCNC: 2.4 G/DL (ref 3.4–4.8)
ALBUMIN/GLOB SERPL: 0.8 RATIO (ref 1.1–2)
ALP SERPL-CCNC: 128 UNIT/L (ref 40–150)
ALT SERPL-CCNC: 18 UNIT/L (ref 0–55)
AST SERPL-CCNC: 20 UNIT/L (ref 5–34)
BASOPHILS # BLD AUTO: 0.02 X10(3)/MCL (ref 0–0.2)
BASOPHILS NFR BLD AUTO: 0.3 %
BILIRUBIN DIRECT+TOT PNL SERPL-MCNC: 0.4 MG/DL
BUN SERPL-MCNC: 20.2 MG/DL (ref 9.8–20.1)
CALCIUM SERPL-MCNC: 8.5 MG/DL (ref 8.4–10.2)
CHLORIDE SERPL-SCNC: 103 MMOL/L (ref 98–107)
CO2 SERPL-SCNC: 25 MMOL/L (ref 23–31)
CREAT SERPL-MCNC: 0.93 MG/DL (ref 0.55–1.02)
EOSINOPHIL # BLD AUTO: 0.15 X10(3)/MCL (ref 0–0.9)
EOSINOPHIL NFR BLD AUTO: 2.3 %
ERYTHROCYTE [DISTWIDTH] IN BLOOD BY AUTOMATED COUNT: 12.9 % (ref 11–14.5)
GFR SERPLBLD CREATININE-BSD FMLA CKD-EPI: 68 MLS/MIN/1.73/M2
GLOBULIN SER-MCNC: 3 GM/DL (ref 2.4–3.5)
GLUCOSE SERPL-MCNC: 99 MG/DL (ref 82–115)
HCT VFR BLD AUTO: 23.8 % (ref 37–47)
HGB BLD-MCNC: 7.5 GM/DL (ref 12–16)
IMM GRANULOCYTES # BLD AUTO: 0.03 X10(3)/MCL (ref 0–0.04)
IMM GRANULOCYTES NFR BLD AUTO: 0.5 %
LYMPHOCYTES # BLD AUTO: 0.8 X10(3)/MCL (ref 0.6–4.6)
LYMPHOCYTES NFR BLD AUTO: 12.1 %
MCH RBC QN AUTO: 29.5 PG
MCHC RBC AUTO-ENTMCNC: 31.5 MG/DL (ref 33–36)
MCV RBC AUTO: 93.7 FL (ref 80–94)
MONOCYTES # BLD AUTO: 0.82 X10(3)/MCL (ref 0.1–1.3)
MONOCYTES NFR BLD AUTO: 12.4 %
NEUTROPHILS # BLD AUTO: 4.77 X10(3)/MCL (ref 2.1–9.2)
NEUTROPHILS NFR BLD AUTO: 72.4 %
NRBC BLD AUTO-RTO: 0 % (ref 0–1)
PLATELET # BLD AUTO: 146 X10(3)/MCL (ref 140–371)
PMV BLD AUTO: 12 FL (ref 9.4–12.4)
POTASSIUM SERPL-SCNC: 4.2 MMOL/L (ref 3.5–5.1)
PROT SERPL-MCNC: 5.4 GM/DL (ref 5.8–7.6)
RBC # BLD AUTO: 2.54 X10(6)/MCL (ref 4.2–5.4)
SODIUM SERPL-SCNC: 137 MMOL/L (ref 136–145)
WBC # SPEC AUTO: 6.6 X10(3)/MCL (ref 4.5–11.5)

## 2023-01-09 PROCEDURE — 36415 COLL VENOUS BLD VENIPUNCTURE: CPT | Performed by: PHYSICIAN ASSISTANT

## 2023-01-09 PROCEDURE — 99233 PR SUBSEQUENT HOSPITAL CARE,LEVL III: ICD-10-PCS | Mod: ,,, | Performed by: STUDENT IN AN ORGANIZED HEALTH CARE EDUCATION/TRAINING PROGRAM

## 2023-01-09 PROCEDURE — 25000003 PHARM REV CODE 250: Performed by: INTERNAL MEDICINE

## 2023-01-09 PROCEDURE — 25000003 PHARM REV CODE 250: Performed by: STUDENT IN AN ORGANIZED HEALTH CARE EDUCATION/TRAINING PROGRAM

## 2023-01-09 PROCEDURE — 85025 COMPLETE CBC W/AUTO DIFF WBC: CPT | Performed by: PHYSICIAN ASSISTANT

## 2023-01-09 PROCEDURE — 11000001 HC ACUTE MED/SURG PRIVATE ROOM

## 2023-01-09 PROCEDURE — 63600175 PHARM REV CODE 636 W HCPCS: Performed by: PHYSICIAN ASSISTANT

## 2023-01-09 PROCEDURE — 25000003 PHARM REV CODE 250: Performed by: PHYSICIAN ASSISTANT

## 2023-01-09 PROCEDURE — 80053 COMPREHEN METABOLIC PANEL: CPT | Performed by: PHYSICIAN ASSISTANT

## 2023-01-09 PROCEDURE — 97168 OT RE-EVAL EST PLAN CARE: CPT

## 2023-01-09 PROCEDURE — 99233 SBSQ HOSP IP/OBS HIGH 50: CPT | Mod: ,,, | Performed by: STUDENT IN AN ORGANIZED HEALTH CARE EDUCATION/TRAINING PROGRAM

## 2023-01-09 PROCEDURE — 97530 THERAPEUTIC ACTIVITIES: CPT

## 2023-01-09 PROCEDURE — 63600175 PHARM REV CODE 636 W HCPCS: Performed by: STUDENT IN AN ORGANIZED HEALTH CARE EDUCATION/TRAINING PROGRAM

## 2023-01-09 PROCEDURE — 21400001 HC TELEMETRY ROOM

## 2023-01-09 RX ORDER — OXYCODONE AND ACETAMINOPHEN 5; 325 MG/1; MG/1
1 TABLET ORAL EVERY 4 HOURS
Status: DISCONTINUED | OUTPATIENT
Start: 2023-01-09 | End: 2023-01-10

## 2023-01-09 RX ADMIN — ENOXAPARIN SODIUM 40 MG: 40 INJECTION SUBCUTANEOUS at 03:01

## 2023-01-09 RX ADMIN — ENOXAPARIN SODIUM 40 MG: 40 INJECTION SUBCUTANEOUS at 04:01

## 2023-01-09 RX ADMIN — QUETIAPINE FUMARATE 25 MG: 25 TABLET ORAL at 11:01

## 2023-01-09 RX ADMIN — METHOCARBAMOL TABLETS 750 MG: 750 TABLET, COATED ORAL at 11:01

## 2023-01-09 RX ADMIN — QUETIAPINE FUMARATE 25 MG: 25 TABLET ORAL at 08:01

## 2023-01-09 RX ADMIN — OXYCODONE HYDROCHLORIDE AND ACETAMINOPHEN 1 TABLET: 5; 325 TABLET ORAL at 11:01

## 2023-01-09 RX ADMIN — DULOXETINE 60 MG: 30 CAPSULE, DELAYED RELEASE ORAL at 08:01

## 2023-01-09 RX ADMIN — OXYCODONE HYDROCHLORIDE AND ACETAMINOPHEN 1 TABLET: 5; 325 TABLET ORAL at 08:01

## 2023-01-09 RX ADMIN — PREDNISONE 5 MG: 5 TABLET ORAL at 08:01

## 2023-01-09 RX ADMIN — METHOCARBAMOL TABLETS 750 MG: 750 TABLET, COATED ORAL at 02:01

## 2023-01-09 RX ADMIN — PANTOPRAZOLE SODIUM 40 MG: 40 TABLET, DELAYED RELEASE ORAL at 08:01

## 2023-01-09 RX ADMIN — OXYCODONE HYDROCHLORIDE AND ACETAMINOPHEN 1 TABLET: 5; 325 TABLET ORAL at 03:01

## 2023-01-09 RX ADMIN — DONEPEZIL HYDROCHLORIDE 10 MG: 5 TABLET, FILM COATED ORAL at 08:01

## 2023-01-09 RX ADMIN — DULOXETINE 60 MG: 30 CAPSULE, DELAYED RELEASE ORAL at 11:01

## 2023-01-09 RX ADMIN — METHOCARBAMOL TABLETS 750 MG: 750 TABLET, COATED ORAL at 08:01

## 2023-01-09 RX ADMIN — POLYETHYLENE GLYCOL 3350 17 G: 17 POWDER, FOR SOLUTION ORAL at 08:01

## 2023-01-09 RX ADMIN — DIPHENHYDRAMINE HYDROCHLORIDE 25 MG: 25 CAPSULE ORAL at 09:01

## 2023-01-09 RX ADMIN — MEMANTINE 10 MG: 5 TABLET ORAL at 11:01

## 2023-01-09 RX ADMIN — LEFLUNOMIDE 20 MG: 20 TABLET, FILM COATED ORAL at 08:01

## 2023-01-09 RX ADMIN — MEMANTINE 10 MG: 5 TABLET ORAL at 08:01

## 2023-01-09 RX ADMIN — AMLODIPINE BESYLATE 2.5 MG: 2.5 TABLET ORAL at 08:01

## 2023-01-09 NOTE — PROGRESS NOTES
[facility] - 3rd [facility] Medicine  Progress Note    Patient Name: Gisele Meraz  MRN: [id number]  Patient Class: IP- Inpatient   Admission Date: 1/4/2023  Length of Stay: 5 days  Attending Physician: Cindy Smith MD  Primary Care Provider: Cindy Almaguer MD        Subjective:     Principal Problem:<principal problem not specified>        HPI:  Ms Jaquez is a 68 y/o female patient with past medical history significant for Alzheimer disease, fibromyalgia, rheumatoid arthritis who presented to ED with complaint of elevated blood pressure. Most of history is obtained from daughter in law who is primary caregiver. She states patient started feeling dizzy Tuesday night, they called office and labs were done which showed elevated BUN and creatinine, urine dipstick was obtained at home which was positive for leukocyte esterase. Wednesday morning blood pressure was checked at home and was 200/91, they called office and were instructed to go to ED. Patient denies history of hypertension and is not taking any medications. In ED blood pressure responded to amlodipine and IV hydralazine. Per daughter in law patient was going to be discharged however she was noted to sway towards the right side when walking. CT and MRI head were negative for ischemic stroke. Blood pressure remains elevated but improved. UA was obtained in ED which was negative.       Overview/Hospital Course:  No notes on file    Interval History: S/p L hip fracture repair over the weekend, per daughter in law in the room patient is not asking for pain medication and complains of pain at times.     Review of Systems   Constitutional:  Negative for chills, fatigue and fever.   HENT:  Negative for congestion, rhinorrhea and sore throat.    Respiratory:  Negative for cough, chest tightness and shortness of breath.    Cardiovascular:  Negative for chest pain, palpitations and leg swelling.   Gastrointestinal:   Positive for constipation. Negative for abdominal distention, abdominal pain, diarrhea, nausea and vomiting.   Genitourinary:  Negative for dysuria.   Musculoskeletal:  Negative for arthralgias, back pain and joint swelling.   Neurological:  Negative for dizziness and headaches.   Psychiatric/Behavioral:  Negative for agitation and confusion.    Objective:     Vital Signs (Most Recent):  Temp: 98.4 °F (36.9 °C) (01/09/23 1311)  Pulse: 93 (01/09/23 1311)  Resp: 18 (01/09/23 1553)  BP: 111/72 (01/09/23 1311)  SpO2: (!) 89 % (01/09/23 1311)   Vital Signs (24h Range):  Temp:  [97.6 °F (36.4 °C)-98.9 °F (37.2 °C)] 98.4 °F (36.9 °C)  Pulse:  [62-93] 93  Resp:  [14-18] 18  SpO2:  [85 %-96 %] 89 %  BP: (108-126)/(65-76) 111/72     Weight: 74.8 kg (165 lb)  Body mass index is 28.32 kg/m².    Intake/Output Summary (Last 24 hours) at 1/9/2023 1645  Last data filed at 1/9/2023 1539  Gross per 24 hour   Intake 860 ml   Output 1100 ml   Net -240 ml      Physical Exam  Constitutional:       General: She is not in acute distress.     Appearance: Normal appearance.   HENT:      Head: Normocephalic and atraumatic.   Eyes:      Extraocular Movements: Extraocular movements intact.      Pupils: Pupils are equal, round, and reactive to light.   Cardiovascular:      Rate and Rhythm: Normal rate and regular rhythm.      Pulses: Normal pulses.      Heart sounds: Normal heart sounds. No murmur heard.    No friction rub. No gallop.   Pulmonary:      Effort: Pulmonary effort is normal.      Breath sounds: Normal breath sounds. No wheezing, rhonchi or rales.   Abdominal:      General: Abdomen is flat. Bowel sounds are normal. There is no distension.      Palpations: Abdomen is soft.      Tenderness: There is no abdominal tenderness.   Musculoskeletal:         General: No swelling or tenderness. Normal range of motion.      Cervical back: Normal range of motion and neck supple. No tenderness.      Right lower leg: No edema.      Left lower leg:  No edema.   Lymphadenopathy:      Cervical: No cervical adenopathy.   Skin:     Findings: No lesion or rash.   Neurological:      General: No focal deficit present.      Mental Status: She is alert and oriented to person, place, and time.      Cranial Nerves: No cranial nerve deficit.      Sensory: No sensory deficit.      Motor: No weakness.   Psychiatric:         Mood and Affect: Mood normal.         Behavior: Behavior normal.         Thought Content: Thought content normal.       Significant Labs: All pertinent labs within the past 24 hours have been reviewed.    Significant Imaging: I have reviewed all pertinent imaging results/findings within the past 24 hours.      Assessment/Plan:      Anemia  Hb has slightly trended down, 7.5 this morning  Patient is asymptomatic  Will monitor daily  This is likely due to blood loss from surgery but will check stool for occult blood as well       Closed left hip fracture  S/p repair per Ortho  Continue PT  Will switch Percocet to scheduled since patient does not remember to request it      Ataxic gait  Unclear etiology  CT and MRI head negative  Now that blood pressure is better controlled, will evaluate for any improvement   PT following, will likely need rehab   Pain is somewhat controlled on Percocet and morphine PRN         Hypertension  No PMH of HTN, not taking any medications  Blood pressure better controlled since starting amlodipine   Conitnue amlodipine 5 mg QD  Continue hydralazine PRN for BP > 180/100, has not been required       Rheumatoid arthritis  Continue home medications       Fibromyalgia  Continue home medications       Early onset Alzheimer's disease with behavioral disturbance  A little more confused from baseline, likely related to hospitalization   Continue home medications       VTE Risk Mitigation (From admission, onward)         Ordered     enoxaparin injection 40 mg  Daily         01/07/23 1446     IP VTE HIGH RISK PATIENT  Once         01/04/23  2155     Place sequential compression device  Until discontinued         01/04/23 2155                Discharge Planning   DANIEL:      Code Status: Full Code   Is the patient medically ready for discharge?:     Reason for patient still in hospital (select all that apply): Treatment  Discharge Plan A: Rehab                  Cindy Almaguer MD  Department of Hospital Medicine   [facility] - 3rd [facility] Medical Telemetry

## 2023-01-09 NOTE — ASSESSMENT & PLAN NOTE
A little more confused from baseline, likely related to hospitalization   Continue home medications

## 2023-01-09 NOTE — PT/OT/SLP PROGRESS
Physical Therapy         Treatment        Gisele Meraz   MRN: [id number]     PT Received On: 01/09/23  PT Start Time: 1010     PT Stop Time: 1100    PT Total Time (min): 50 min       Billable Minutes:  Therapeutic Activity 3  Total Minutes: 50    Treatment Type: Treatment  PT/PTA: PT     PTA Visit Number: 1       General Precautions: Standard, fall  Orthopedic Precautions: Orthopedic Precautions : LLE weight bearing as tolerated (no IR/ER x6 weeks; ROMAT with ABD pillow in bed)   Braces: Braces: N/A    Spiritual, Cultural Beliefs, Latter-day Practices, Values that Affect Care: no    Subjective:  Communicated with NSG prior to session.    Pain/Comfort  Pain Rating 1:  (pt reports no pain)    Objective:  Patient found resting in bed with HOB elevated and hip abduction pillow; roll belt in place    Functional Mobility:  Bed Mobility:   Supine to sit: Moderate Assistance    Upon sitting EOB pt was initially asymptomatic. After a few minutes pt began to report feelings of dizziness. Instructed pt to keep eyes open and preform ankle pumps. Pt reported a decrease in symptoms after a few minutes.     Transfer Training:  Sit to stand:Moderate Assistance with Rolling Walker   Bed <> Chair:  Step Transfer with Moderate Assistance with Rolling Walker    VC required for proper hand placement on RW throughout transfers    Assisted required for initial weight shifting   VC for step initiation; demonstrated quick step/shuffle of R LE 2/2 decreased tolerance to weight bearing on LLE     Pt required frequent cues/reminders throughout session 2/2 hx of dementia.    Patient left up in chair with all lines intact, call button in reach, and DIL present. Hip abduction pillow in place.   No chair alarms to be found at this time. Spoke to BELL who stated she will be with pt in room all day today. Will work to ensure safety measure are taken int he coming days to keep pt safe while OOB    Assessment:  Gisele Meraz is a 67 y.o. female with a  medical diagnosis of L femoral neck fx s/p hemiarthroplasty   Extended time spent at end of session speaking with pt's DIL to discuss POC, discharge recommendations, PUP, ect.     Rehab potential is good.    Activity tolerance: Good    Discharge recommendations: Discharge Facility/Level of Care Needs: rehabilitation facility     Equipment recommendations: Equipment Needed After Discharge: bedside commode     GOALS:   Multidisciplinary Problems       Physical Therapy Goals          Problem: Physical Therapy    Goal Priority Disciplines Outcome Goal Variances Interventions   Physical Therapy Goal     PT, PT/OT Ongoing, Progressing     Description: Goals to be met by: 23     Patient will increase functional independence with mobility by performin. Supine to sit with Stand-by Assistance  2. Sit to supine with Stand-by Assistance  3. Sit to stand transfer Minimal Assistance and RW  4. Gait  x100 feet with Minimal Assistance and RW                          PLAN:    Patient to be seen 6 x/week  to address the above listed problems via therapeutic activities, therapeutic exercises, neuromuscular re-education, gait training  Plan of Care expires: 23  Plan of Care reviewed with: patient (DIL)         2023

## 2023-01-09 NOTE — PLAN OF CARE
Problem: Occupational Therapy  Goal: Occupational Therapy Goal  Description: Goals to be met by: 1/23/23     Patient will increase functional independence with ADLs by performing:    UE Dressing with Stand-by Assistance.  LE Dressing with Minimal Assistance and Assistive Devices as needed.  Toileting from bedside commode with Minimal Assistance for hygiene and clothing management.     Outcome: Ongoing, Progressing

## 2023-01-09 NOTE — SUBJECTIVE & OBJECTIVE
Interval History: S/p L hip fracture repair over the weekend, per daughter in law in the room patient is not asking for pain medication and complains of pain at times.     Review of Systems   Constitutional:  Negative for chills, fatigue and fever.   HENT:  Negative for congestion, rhinorrhea and sore throat.    Respiratory:  Negative for cough, chest tightness and shortness of breath.    Cardiovascular:  Negative for chest pain, palpitations and leg swelling.   Gastrointestinal:  Positive for constipation. Negative for abdominal distention, abdominal pain, diarrhea, nausea and vomiting.   Genitourinary:  Negative for dysuria.   Musculoskeletal:  Negative for arthralgias, back pain and joint swelling.   Neurological:  Negative for dizziness and headaches.   Psychiatric/Behavioral:  Negative for agitation and confusion.    Objective:     Vital Signs (Most Recent):  Temp: 98.4 °F (36.9 °C) (01/09/23 1311)  Pulse: 93 (01/09/23 1311)  Resp: 18 (01/09/23 1553)  BP: 111/72 (01/09/23 1311)  SpO2: (!) 89 % (01/09/23 1311)   Vital Signs (24h Range):  Temp:  [97.6 °F (36.4 °C)-98.9 °F (37.2 °C)] 98.4 °F (36.9 °C)  Pulse:  [62-93] 93  Resp:  [14-18] 18  SpO2:  [85 %-96 %] 89 %  BP: (108-126)/(65-76) 111/72     Weight: 74.8 kg (165 lb)  Body mass index is 28.32 kg/m².    Intake/Output Summary (Last 24 hours) at 1/9/2023 1645  Last data filed at 1/9/2023 1539  Gross per 24 hour   Intake 860 ml   Output 1100 ml   Net -240 ml      Physical Exam  Constitutional:       General: She is not in acute distress.     Appearance: Normal appearance.   HENT:      Head: Normocephalic and atraumatic.   Eyes:      Extraocular Movements: Extraocular movements intact.      Pupils: Pupils are equal, round, and reactive to light.   Cardiovascular:      Rate and Rhythm: Normal rate and regular rhythm.      Pulses: Normal pulses.      Heart sounds: Normal heart sounds. No murmur heard.    No friction rub. No gallop.   Pulmonary:      Effort:  Pulmonary effort is normal.      Breath sounds: Normal breath sounds. No wheezing, rhonchi or rales.   Abdominal:      General: Abdomen is flat. Bowel sounds are normal. There is no distension.      Palpations: Abdomen is soft.      Tenderness: There is no abdominal tenderness.   Musculoskeletal:         General: No swelling or tenderness. Normal range of motion.      Cervical back: Normal range of motion and neck supple. No tenderness.      Right lower leg: No edema.      Left lower leg: No edema.   Lymphadenopathy:      Cervical: No cervical adenopathy.   Skin:     Findings: No lesion or rash.   Neurological:      General: No focal deficit present.      Mental Status: She is alert and oriented to person, place, and time.      Cranial Nerves: No cranial nerve deficit.      Sensory: No sensory deficit.      Motor: No weakness.   Psychiatric:         Mood and Affect: Mood normal.         Behavior: Behavior normal.         Thought Content: Thought content normal.       Significant Labs: All pertinent labs within the past 24 hours have been reviewed.    Significant Imaging: I have reviewed all pertinent imaging results/findings within the past 24 hours.

## 2023-01-09 NOTE — ASSESSMENT & PLAN NOTE
Unclear etiology  CT and MRI head negative  Now that blood pressure is better controlled, will evaluate for any improvement   PT following, will likely need rehab   Pain is somewhat controlled on Percocet and morphine PRN

## 2023-01-09 NOTE — PROGRESS NOTES
[facility] - 3rd Floor Medical Telemetry  Orthopedics  Progress Note    Patient Name: Gisele Meraz  MRN: [id number]  Admission Date: 1/4/2023  Hospital Length of Stay: 5 days  Attending Provider: Cindy Smith MD  Primary Care Provider: Cindy Almaguer MD  Follow-up For: Procedure(s) (LRB):  HEMIARTHROPLASTY, HIP (Left)    Post-Operative Day: 1 Day Post-Op  Subjective:     Principal Problem:<principal problem not specified>    Principal Orthopedic Problem: 2 Days Post-Op   Left hip hemiarthroplasty    Interval History: Patient with left femoral neck fx s/p hemiarthroplasty. Resting comfortably this morning although still endorses some pain in the hip. Was able to work with physical therapy yesterday during their re-evaluation. No acute complaints otherwise.    Review of patient's allergies indicates:   Allergen Reactions    Penicillin Hives    Penicillins Swelling       Current Facility-Administered Medications   Medication    0.9%  NaCl infusion (for blood administration)    acetaminophen tablet 650 mg    amLODIPine tablet 2.5 mg    diphenhydrAMINE capsule 25 mg    donepeziL tablet 10 mg    DULoxetine DR capsule 60 mg    enoxaparin injection 40 mg    fentaNYL injection 25 mcg    hydrALAZINE injection 5 mg    leflunomide tablet 20 mg    LIDOcaine (PF) 10 mg/ml (1%) injection 10 mg    melatonin tablet 6 mg    memantine tablet 10 mg    methocarbamoL tablet 750 mg    midazolam (VERSED) 1 mg/mL injection 1 mg    morphine injection 2 mg    morphine injection 4 mg    ondansetron disintegrating tablet 8 mg    ondansetron disintegrating tablet 8 mg    oxyCODONE-acetaminophen  mg per tablet 1 tablet    oxyCODONE-acetaminophen 5-325 mg per tablet 1 tablet    pantoprazole EC tablet 40 mg    polyethylene glycol packet 17 g    predniSONE tablet 5 mg    QUEtiapine tablet 25 mg    senna tablet 8.6 mg    sodium chloride 0.9% flush 10 mL     Objective:     Vital Signs (Most Recent):  Temp: 98 °F  "(36.7 °C) (01/09/23 0300)  Pulse: 91 (01/09/23 0300)  Resp: 18 (01/09/23 0300)  BP: 117/68 (01/09/23 0300)  SpO2: 96 % (01/09/23 0300) Vital Signs (24h Range):  Temp:  [97.6 °F (36.4 °C)-98.9 °F (37.2 °C)] 98 °F (36.7 °C)  Pulse:  [] 91  Resp:  [18-20] 18  SpO2:  [85 %-100 %] 96 %  BP: ()/(60-74) 117/68     Weight: 74.8 kg (165 lb)  Height: 5' 4" (162.6 cm)  Body mass index is 28.32 kg/m².      Intake/Output Summary (Last 24 hours) at 1/9/2023 0752  Last data filed at 1/8/2023 2110  Gross per 24 hour   Intake 480 ml   Output 400 ml   Net 80 ml         Physical Exam:   Musculoskeletal:       Left lower extremity: Dressing is CDI without drainage. Dermabond tape in place; compartments are soft and compressible; No pain with ROM at the hip, knee, or ankle;appropriate tenderness to palpation; dorsi/plantar flexes the foot; SILT distally; BCR distally; DP pulse 2+      Diagnostic Findings:   Significant Labs:   Recent Lab Results         01/08/23  0727   01/07/23  1145   01/07/23  1140   01/07/23  0333        Unit Blood Type Code     5100  [P]              5100  [P]         Unit Expiration     [id number]  [P]              [id number]  [P]         Albumin/Globulin Ratio 1.1             ABO and RH   O POS           Albumin 2.7             Alkaline Phosphatase 136             ALT 17             Anion Gap       7.0       AST 17             Baso # 0.04       0.04       Basophil % 0.4       0.5       BILIRUBIN TOTAL 0.6             BUN 20.0       12.8       BUN/CREAT RATIO       15       Calcium 8.0       8.2       Chloride 100       107       CO2 26       26       Creatinine 0.93       0.83       CROSSMATCH INTERPRETATION     Compatible  [P]              Compatible  [P]         DISPENSE STATUS     Selected  [P]              Selected  [P]         eGFR 68       77  Comment:                      EGFR INTERPRETATION    Beginning 8/15/22 we are reporting the eGFRcr calculation as recommended by the National Kidney " Foundation. The eGFRcr equation has similar overall performance characteristics to the older equation, but the values may differ by more than 10% particularly at higher values of eGFRcr and younger adult ages.    NKF stages of chronic kidney disease (CKD)  Stage 1: Kidney damage with normal or increased eGFR (>90 mL/min/1.73 m^2)  Stage 2: Mild reduction in GFR (60-89 mL/min/1.73 m^2)  Stage 3a: Moderate reduction in GFR (45-59 mL/min/1.73 m^2)  Stage 3b: Moderate reduction in GFR (30-44 mL/min/1.73 m^2)  Stage 4: Severe reduction in GFR (15-29 mL/min/1.73 m^2)  Stage 5: Kidney failure (GFR <15 mL/min/1.73 m^2)           Eos # 0.01       0.19       Eosinophil % 0.1       2.5       Globulin, Total 2.4             Glucose 130       105       Group & Rh     O POS         Hematocrit 28.9       33.1       Hemoglobin 8.9       10.4       Immature Grans (Abs) 0.03       0.04       Immature Granulocytes 0.3       0.5       Indirect Fariha GEL     NEG         Lymph # 1.01       1.63       LYMPH % 10.9       21.8       MCH 29.4       29.8       MCHC 30.8       31.4       MCV 95.4       94.8       Mono # 1.06       0.75       Mono % 11.4       10.0       MPV 11.9       11.6       Neut # 7.15       4.82       Neut % 76.9       64.7       nRBC 0.0       0.0       Platelets 186       167       Potassium 4.4       3.9       Product Code     I2396F89  [P]              [id number]  [P]         PROTEIN TOTAL 5.1             RBC 3.03       3.49       RDW 13.0       13.1       Sodium 136       140       Unit ABO/Rh     O POS  [P]              O POS  [P]         UNIT NUMBER     [id number]  [P]              [id number]  [P]         WBC 9.3       7.5                [P] - Preliminary Result                Significant Imaging: I have reviewed and interpreted all pertinent imaging results/findings.     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 Utilize robaxin, Norco, and tylenol as needed.   WBAT and no internal or external rotation x 6 weeks otherwise ROMAT with abduction pillow while in bed  Daily dressing changes beginning tomorrow. Dermabond tape below island dressing is to remain in place until follow up or x 3 weeks.   Lovenox for DVT ppx during stay followed by aspirin 81 mg bid upon discharge  Follow up with Dr. Andrew in approx 3 weeks for wound check and repeat x rays.   She is orthopaedically stable for discharge at this time. Please call with any questions or concerns.     The above findings, diagnostics, and treatment plan were discussed with Dr. Andrew who is in agreement with the plan of care except as stated in additional documentation.      Harriet Rudd PA-C  Orthopedic Trauma Surgery  [facility]

## 2023-01-09 NOTE — PLAN OF CARE
01/09/23 1305   Discharge Assessment   Assessment Type Discharge Planning Assessment   Confirmed/corrected address, phone number and insurance Yes   Confirmed Demographics Correct on Facesheet   Source of Information patient;family   Communicated DANIEL with patient/caregiver Yes;Date not available/Unable to determine   People in Home alone   Prior to hospitilization cognitive status: Alert/Oriented   Walking or Climbing Stairs ambulation difficulty, requires equipment   Dressing/Bathing bathing difficulty, assistance 1 person   Equipment Currently Used at Home walker, rolling   Readmission within 30 days? No   Do you take prescription medications? Yes   Do you have prescription coverage? Yes   Coverage Medicare Part A & B and [employer] Supplement   Do you have any problems affording any of your prescribed medications? No   How do you get to doctors appointments? family or friend will provide   Are you on dialysis? No   Do you take coumadin? No   Discharge Plan A Rehab   Discharge Plan B Skilled Nursing Facility   Discharge Plan discussed with: Patient  (Daughter In Law)

## 2023-01-09 NOTE — ASSESSMENT & PLAN NOTE
No PMH of HTN, not taking any medications  Blood pressure better controlled since starting amlodipine   Conitnue amlodipine 5 mg QD  Continue hydralazine PRN for BP > 180/100, has not been required

## 2023-01-09 NOTE — ASSESSMENT & PLAN NOTE
Hb has slightly trended down, 7.5 this morning  Patient is asymptomatic  Will monitor daily  This is likely due to blood loss from surgery but will check stool for occult blood as well

## 2023-01-09 NOTE — ASSESSMENT & PLAN NOTE
S/p repair per Ortho  Continue PT  Will switch Percocet to scheduled since patient does not remember to request it

## 2023-01-09 NOTE — PT/OT/SLP RE-EVAL
Occupational Therapy   Re-evaluation    Name: Gisele Meraz  MRN: [id number]  Admitting Diagnosis: ataxic gait, HTN, RA, fibromyalgia, early onset Alzheimer's disease with behavioral disturbance  Recent Surgery: Procedure(s) (LRB):  HEMIARTHROPLASTY, HIP (Left) 2 Days Post-Op    Recommendations:     Discharge Recommendations: nursing facility, skilled vs rehab facility (TBD, pending progress)  Discharge Equipment Recommendations: bedside commode, dressing devices, shower chair vs TTB (TBD, pending progress)  Barriers to discharge: medical dx, placement    Assessment:     Gisele Meraz is a 67 y.o. female with a medical diagnosis of ataxic gait, HTN, RA, fibromyalgia, early onset Alzheimer's disease with behavioral disturbance. Pt found with L femoral neck fx, and is now s/p L hip hemiarthroplasty. She presents with pain in L hip with movement, increased confusion, and c/o dizziness after t/f from sit to supine. Performance deficits affecting function are weakness, impaired endurance, impaired self care skills, impaired functional mobility, gait instability, impaired balance, impaired cognition, decreased lower extremity function, decreased safety awareness, pain, orthopedic precautions.      Rehab Prognosis: Fair; patient would benefit from acute skilled OT services to address these deficits and reach maximum level of function.       Plan:     Patient to be seen 6 x/week to address the above listed problems via self-care/home management, therapeutic activities, therapeutic exercises  Plan of Care Expires: 01/23/23  Plan of Care Reviewed with: patient (daughter-in-law)    Subjective     Chief Complaint: Pain in L hip with movement. Pt also c/o dizziness after t/f from sit to supine. RN notified of pt's pain and dizziness.  Patient/Family stated goals: Return to PLOF.  Communicated with: RN prior to session.    Objective:     Communicated with: RN prior to session. Patient found up in chair with: Kane murdock  upon OT entry to room.    General Precautions: Standard, fall, L hip pxns (no internal or external rotation x6 weeks otherwise ROMAT with abduction pillow while in bed per MD)  Orthopedic Precautions: LLE WBAT  Braces: Hip abduction pillow while in bed  Respiratory Status: Room air  Vitals:   BP: (after t/f from sit to supine 2/2 pt's c/o dizziness): 144/83. RN notified.   IN: 94    Occupational Performance:    Bed Mobility:    Patient completed Sit to Supine with max A x2 for safety and vcs provided.    Functional Mobility/Transfers:  Patient completed Sit <> Stand Transfer from chair with mod A x2 for safety and vcs provided, using rolling walker.  Patient completed Chair > Bed Transfer using Step Transfer technique and RW with mod A x2 for safety and vcs provided for proper sequencing and walker mgmt.    Cognitive/Visual Perceptual:  Cognitive/Psychosocial Skills:     -       Oriented to: Person   -       Follows Commands/attention:Follows one-step commands and Follows two-step commands  -       Memory: Impaired  -       Safety awareness/insight to disability: impaired     Physical Exam:  Sensation:  -       Intact  Upper Extremity Range of Motion:     -       BUEs: WFL except mod deficits noted in AROM of L shoulder flex and min deficits noted in AROM of R shoulder flex.  Upper Extremity Strength:    -       BUEs: Grossly 4/5 except B shoulder flex=3-/5    Education:  OT provided education on current WBS for LLE and L hip pxns in order to increase pt's safety during ADL participation.    Patient left HOB slightly elevated with all lines intact, call button in reach, bed alarm on, RN notified, pt's DIL present, and Luly roll belt donned.    GOALS:   Multidisciplinary Problems       Occupational Therapy Goals          Problem: Occupational Therapy    Goal Priority Disciplines Outcome Interventions   Occupational Therapy Goal     OT, PT/OT Ongoing, Progressing    Description: Goals to be met by: 1/23/23      Patient will increase functional independence with ADLs by performing:    UE Dressing with Stand-by Assistance.  LE Dressing with Minimal Assistance and Assistive Devices as needed.  Toileting from bedside commode with Minimal Assistance for hygiene and clothing management.                          History:     Past Medical History:   Diagnosis Date    Acid reflux     Arthritis     Dementia     Patient on Memantine BID    Fibromyalgia     Pneumonia     RA (rheumatoid arthritis)          Past Surgical History:   Procedure Laterality Date    ADENOIDECTOMY       SECTION      HEMIARTHROPLASTY OF HIP Left 2023    Procedure: HEMIARTHROPLASTY, HIP;  Surgeon: Rodrick Andrew DO;  Location: [facility];  Service: Orthopedics;  Laterality: Left;    HYSTERECTOMY      TONSILLECTOMY         Time Tracking:     OT Date of Treatment: 23  OT Start Time: 1507  OT Stop Time: 1536  OT Total Time (min): 29 min    Billable Minutes: Re-eval 29 mins    2023

## 2023-01-09 NOTE — PLAN OF CARE
Problem: Physical Therapy  Goal: Physical Therapy Goal  Description: Goals to be met by: 23     Patient will increase functional independence with mobility by performin. Supine to sit with Stand-by Assistance  2. Sit to supine with Stand-by Assistance  3. Sit to stand transfer Minimal Assistance and RW  4. Gait  x100 feet with Minimal Assistance and RW     Outcome: Ongoing, Progressing

## 2023-01-10 LAB
ALBUMIN SERPL-MCNC: 2.3 G/DL (ref 3.4–4.8)
ALBUMIN/GLOB SERPL: 1 RATIO (ref 1.1–2)
ALP SERPL-CCNC: 122 UNIT/L (ref 40–150)
ALT SERPL-CCNC: 14 UNIT/L (ref 0–55)
AST SERPL-CCNC: 13 UNIT/L (ref 5–34)
BASOPHILS # BLD AUTO: 0.02 X10(3)/MCL (ref 0–0.2)
BASOPHILS NFR BLD AUTO: 0.3 %
BILIRUBIN DIRECT+TOT PNL SERPL-MCNC: 0.6 MG/DL
BUN SERPL-MCNC: 14.5 MG/DL (ref 9.8–20.1)
CALCIUM SERPL-MCNC: 8.3 MG/DL (ref 8.4–10.2)
CHLORIDE SERPL-SCNC: 104 MMOL/L (ref 98–107)
CO2 SERPL-SCNC: 29 MMOL/L (ref 23–31)
CREAT SERPL-MCNC: 0.76 MG/DL (ref 0.55–1.02)
EOSINOPHIL # BLD AUTO: 0.21 X10(3)/MCL (ref 0–0.9)
EOSINOPHIL NFR BLD AUTO: 3.2 %
ERYTHROCYTE [DISTWIDTH] IN BLOOD BY AUTOMATED COUNT: 12.9 % (ref 11.5–17)
GFR SERPLBLD CREATININE-BSD FMLA CKD-EPI: >60 MLS/MIN/1.73/M2
GLOBULIN SER-MCNC: 2.4 GM/DL (ref 2.4–3.5)
GLUCOSE SERPL-MCNC: 97 MG/DL (ref 82–115)
HCT VFR BLD AUTO: 23.3 % (ref 37–47)
HGB BLD-MCNC: 7.4 GM/DL (ref 12–16)
IMM GRANULOCYTES # BLD AUTO: 0.02 X10(3)/MCL (ref 0–0.04)
IMM GRANULOCYTES NFR BLD AUTO: 0.3 %
LYMPHOCYTES # BLD AUTO: 1.61 X10(3)/MCL (ref 0.6–4.6)
LYMPHOCYTES NFR BLD AUTO: 24.3 %
MCH RBC QN AUTO: 29.7 PG
MCHC RBC AUTO-ENTMCNC: 31.8 MG/DL (ref 33–36)
MCV RBC AUTO: 93.6 FL (ref 80–94)
MONOCYTES # BLD AUTO: 0.72 X10(3)/MCL (ref 0.1–1.3)
MONOCYTES NFR BLD AUTO: 10.9 %
NEUTROPHILS # BLD AUTO: 4.04 X10(3)/MCL (ref 2.1–9.2)
NEUTROPHILS NFR BLD AUTO: 61 %
NRBC BLD AUTO-RTO: 0 %
PLATELET # BLD AUTO: 162 X10(3)/MCL (ref 130–400)
PMV BLD AUTO: 12.1 FL (ref 7.4–10.4)
POCT GLUCOSE: 107 MG/DL (ref 70–110)
POTASSIUM SERPL-SCNC: 4.1 MMOL/L (ref 3.5–5.1)
PROT SERPL-MCNC: 4.7 GM/DL (ref 5.8–7.6)
RBC # BLD AUTO: 2.49 X10(6)/MCL (ref 4.2–5.4)
SODIUM SERPL-SCNC: 138 MMOL/L (ref 136–145)
WBC # SPEC AUTO: 6.6 X10(3)/MCL (ref 4.5–11.5)

## 2023-01-10 PROCEDURE — 36415 COLL VENOUS BLD VENIPUNCTURE: CPT | Performed by: PHYSICIAN ASSISTANT

## 2023-01-10 PROCEDURE — 25000003 PHARM REV CODE 250: Performed by: INTERNAL MEDICINE

## 2023-01-10 PROCEDURE — 63600175 PHARM REV CODE 636 W HCPCS: Performed by: PHYSICIAN ASSISTANT

## 2023-01-10 PROCEDURE — 97116 GAIT TRAINING THERAPY: CPT | Mod: CQ

## 2023-01-10 PROCEDURE — 21400001 HC TELEMETRY ROOM

## 2023-01-10 PROCEDURE — 99233 PR SUBSEQUENT HOSPITAL CARE,LEVL III: ICD-10-PCS | Mod: ,,, | Performed by: STUDENT IN AN ORGANIZED HEALTH CARE EDUCATION/TRAINING PROGRAM

## 2023-01-10 PROCEDURE — 25000003 PHARM REV CODE 250: Performed by: PHYSICIAN ASSISTANT

## 2023-01-10 PROCEDURE — 80053 COMPREHEN METABOLIC PANEL: CPT | Performed by: PHYSICIAN ASSISTANT

## 2023-01-10 PROCEDURE — 25000003 PHARM REV CODE 250: Performed by: STUDENT IN AN ORGANIZED HEALTH CARE EDUCATION/TRAINING PROGRAM

## 2023-01-10 PROCEDURE — 99233 SBSQ HOSP IP/OBS HIGH 50: CPT | Mod: ,,, | Performed by: STUDENT IN AN ORGANIZED HEALTH CARE EDUCATION/TRAINING PROGRAM

## 2023-01-10 PROCEDURE — 27000221 HC OXYGEN, UP TO 24 HOURS

## 2023-01-10 PROCEDURE — 63600175 PHARM REV CODE 636 W HCPCS: Performed by: STUDENT IN AN ORGANIZED HEALTH CARE EDUCATION/TRAINING PROGRAM

## 2023-01-10 PROCEDURE — 97530 THERAPEUTIC ACTIVITIES: CPT | Mod: CQ

## 2023-01-10 PROCEDURE — 85025 COMPLETE CBC W/AUTO DIFF WBC: CPT | Performed by: PHYSICIAN ASSISTANT

## 2023-01-10 PROCEDURE — 97535 SELF CARE MNGMENT TRAINING: CPT

## 2023-01-10 RX ORDER — OXYCODONE AND ACETAMINOPHEN 5; 325 MG/1; MG/1
1 TABLET ORAL EVERY 6 HOURS
Status: DISCONTINUED | OUTPATIENT
Start: 2023-01-11 | End: 2023-01-18 | Stop reason: HOSPADM

## 2023-01-10 RX ORDER — NALOXONE HCL 0.4 MG/ML
0.4 VIAL (ML) INJECTION
Status: DISCONTINUED | OUTPATIENT
Start: 2023-01-10 | End: 2023-01-18 | Stop reason: HOSPADM

## 2023-01-10 RX ADMIN — AMLODIPINE BESYLATE 2.5 MG: 2.5 TABLET ORAL at 10:01

## 2023-01-10 RX ADMIN — PANTOPRAZOLE SODIUM 40 MG: 40 TABLET, DELAYED RELEASE ORAL at 10:01

## 2023-01-10 RX ADMIN — DONEPEZIL HYDROCHLORIDE 10 MG: 5 TABLET, FILM COATED ORAL at 10:01

## 2023-01-10 RX ADMIN — MEMANTINE 10 MG: 5 TABLET ORAL at 10:01

## 2023-01-10 RX ADMIN — OXYCODONE HYDROCHLORIDE AND ACETAMINOPHEN 1 TABLET: 5; 325 TABLET ORAL at 10:01

## 2023-01-10 RX ADMIN — METHOCARBAMOL TABLETS 750 MG: 750 TABLET, COATED ORAL at 09:01

## 2023-01-10 RX ADMIN — ENOXAPARIN SODIUM 40 MG: 40 INJECTION SUBCUTANEOUS at 05:01

## 2023-01-10 RX ADMIN — PREDNISONE 5 MG: 5 TABLET ORAL at 10:01

## 2023-01-10 RX ADMIN — DULOXETINE 60 MG: 30 CAPSULE, DELAYED RELEASE ORAL at 10:01

## 2023-01-10 RX ADMIN — METHOCARBAMOL TABLETS 750 MG: 750 TABLET, COATED ORAL at 04:01

## 2023-01-10 RX ADMIN — OXYCODONE HYDROCHLORIDE AND ACETAMINOPHEN 1 TABLET: 5; 325 TABLET ORAL at 03:01

## 2023-01-10 RX ADMIN — MEMANTINE 10 MG: 5 TABLET ORAL at 09:01

## 2023-01-10 RX ADMIN — POLYETHYLENE GLYCOL 3350 17 G: 17 POWDER, FOR SOLUTION ORAL at 10:01

## 2023-01-10 RX ADMIN — MORPHINE SULFATE 2 MG: 4 INJECTION INTRAVENOUS at 10:01

## 2023-01-10 RX ADMIN — OXYCODONE AND ACETAMINOPHEN 1 TABLET: 10; 325 TABLET ORAL at 10:01

## 2023-01-10 RX ADMIN — QUETIAPINE FUMARATE 25 MG: 25 TABLET ORAL at 09:01

## 2023-01-10 RX ADMIN — DULOXETINE 60 MG: 30 CAPSULE, DELAYED RELEASE ORAL at 09:01

## 2023-01-10 RX ADMIN — OXYCODONE HYDROCHLORIDE AND ACETAMINOPHEN 1 TABLET: 5; 325 TABLET ORAL at 04:01

## 2023-01-10 RX ADMIN — METHYLNALTREXONE BROMIDE 12 MG: 12 INJECTION, SOLUTION SUBCUTANEOUS at 05:01

## 2023-01-10 RX ADMIN — QUETIAPINE FUMARATE 25 MG: 25 TABLET ORAL at 10:01

## 2023-01-10 RX ADMIN — LEFLUNOMIDE 20 MG: 20 TABLET, FILM COATED ORAL at 10:01

## 2023-01-10 NOTE — NURSING
Nurses Note -- 4 Eyes      1/10/2023   5:51 AM      Skin assessed during: Transfer      [x] No Pressure Injuries Present    []Prevention Measures Documented      [x] Yes- Altered Skin Integrity Present or Discovered   [x] LDA Added if Not in Epic (Describe Wound)   [] New Altered Skin Integrity was Present on Admit and Documented in LDA   [] Wound Image Taken    Wound Care Consulted? No    Attending Nurse:  Noemy Nava LPN     Second RN/Staff Member:  Dacia Mi RN

## 2023-01-10 NOTE — ASSESSMENT & PLAN NOTE
Hb has slightly trended down, 7.4 this morning  Patient is asymptomatic  Will monitor daily  This is likely due to blood loss from surgery but will check stool for occult blood as well

## 2023-01-10 NOTE — PT/OT/SLP PROGRESS
Physical Therapy Treatment    Patient Name:  Gisele Meraz   MRN:  [id number]    Recommendations:     Discharge Recommendations:  rehabilitation facility   Discharge Equipment Recommendations: bedside commode     Subjective     Patient awake and alert.     Objective:     General Precautions: Standard, fall   Orthopedic Precautions:LLE weight bearing as tolerated (no IR/ER x6 weeks; ROMAT with ABD pillow in bed)   Braces:    Respiratory Status: Nasal cannula, flow 1 L/min     Communicated with nurse prior to treatment.     Functional Mobility:    Rolling:Moderate Assistance  Supine to sit:Moderate Assistance  Sit to stand transfer: Moderate Assistance  Bed to chair transfer:Moderate Assistance  Gait 20 ft with RW mod assist and SPO2 >92% t/o treatment. Cholo hip precautions maintained.       AM-PAC 6 CLICK MOBILITY        Patient left supine with call button in reach, restraints reapplied at end of session, and daughter present..    GOALS:   Multidisciplinary Problems       Physical Therapy Goals          Problem: Physical Therapy    Goal Priority Disciplines Outcome Goal Variances Interventions   Physical Therapy Goal     PT, PT/OT Ongoing, Progressing     Description: Goals to be met by: 23     Patient will increase functional independence with mobility by performin. Supine to sit with Stand-by Assistance  2. Sit to supine with Stand-by Assistance  3. Sit to stand transfer Minimal Assistance and RW  4. Gait  x100 feet with Minimal Assistance and RW                          Assessment:     Gisele Meraz is a 67 y.o. female admitted with a medical diagnosis of <principal problem not specified>.     Patient Active Problem List   Diagnosis    Early onset Alzheimer's disease with behavioral disturbance    Fibromyalgia    Insomnia    Mood disorder    Rheumatoid arthritis    Vitamin D deficiency    Hypertension    Ataxic gait    Closed left hip fracture    Anemia        Rehab Prognosis: Good; patient would benefit  from acute skilled PT services to address these deficits and reach maximum level of function.    Recent Surgery: Procedure(s) (LRB):  HEMIARTHROPLASTY, HIP (Left) 3 Days Post-Op    Plan:     During this hospitalization, patient to be seen 6 x/week to address the identified rehab impairments via therapeutic activities, therapeutic exercises, neuromuscular re-education, gait training and progress toward the following goals:    Plan of Care Expires:  02/07/23    Billable Minutes     Billable Minutes: Gait Training 15 and Therapeutic Activity 10    Treatment Type: Treatment  PT/PTA: PTA     PTA Visit Number: 2     01/10/2023

## 2023-01-10 NOTE — PROGRESS NOTES
[facility] - 4th Floor [facility] Medicine  Progress Note    Patient Name: Gisele Meraz  MRN: [id number]  Patient Class: IP- Inpatient   Admission Date: 1/4/2023  Length of Stay: 6 days  Attending Physician: Cindy Smith MD  Primary Care Provider: Cindy Almaguer MD        Subjective:     Principal Problem:<principal problem not specified>        HPI:  Ms Jaquez is a 68 y/o female patient with past medical history significant for Alzheimer disease, fibromyalgia, rheumatoid arthritis who presented to ED with complaint of elevated blood pressure. Most of history is obtained from daughter in law who is primary caregiver. She states patient started feeling dizzy Tuesday night, they called office and labs were done which showed elevated BUN and creatinine, urine dipstick was obtained at home which was positive for leukocyte esterase. Wednesday morning blood pressure was checked at home and was 200/91, they called office and were instructed to go to ED. Patient denies history of hypertension and is not taking any medications. In ED blood pressure responded to amlodipine and IV hydralazine. Per daughter in law patient was going to be discharged however she was noted to sway towards the right side when walking. CT and MRI head were negative for ischemic stroke. Blood pressure remains elevated but improved. UA was obtained in ED which was negative.       Overview/Hospital Course:  No notes on file    Interval History: Pain better controlled since switching Percocet to scheduled however patient is more somnolent. Still has not had BM. Denies abdominal pain.     Review of Systems   Constitutional:  Negative for chills, fatigue and fever.   HENT:  Negative for congestion, rhinorrhea and sore throat.    Respiratory:  Negative for cough, chest tightness and shortness of breath.    Cardiovascular:  Negative for chest pain, palpitations and leg swelling.   Gastrointestinal:  Positive  for constipation. Negative for abdominal distention, abdominal pain, diarrhea, nausea and vomiting.   Genitourinary:  Negative for dysuria.   Musculoskeletal:  Negative for arthralgias, back pain and joint swelling.   Neurological:  Negative for dizziness and headaches.   Psychiatric/Behavioral:  Negative for agitation and confusion.    Objective:     Vital Signs (Most Recent):  Temp: 98.1 °F (36.7 °C) (01/10/23 0823)  Pulse: 87 (01/10/23 0823)  Resp: 20 (01/10/23 1616)  BP: 113/65 (01/10/23 0823)  SpO2: (!) 90 % (01/10/23 0823)   Vital Signs (24h Range):  Temp:  [97.9 °F (36.6 °C)-98.9 °F (37.2 °C)] 98.1 °F (36.7 °C)  Pulse:  [87-93] 87  Resp:  [17-20] 20  SpO2:  [89 %-94 %] 90 %  BP: (113-131)/(62-77) 113/65     Weight: 74.8 kg (165 lb)  Body mass index is 28.32 kg/m².  No intake or output data in the 24 hours ending 01/10/23 1728   Physical Exam  Constitutional:       General: She is not in acute distress.     Appearance: Normal appearance.   HENT:      Head: Normocephalic and atraumatic.   Eyes:      Extraocular Movements: Extraocular movements intact.      Pupils: Pupils are equal, round, and reactive to light.   Cardiovascular:      Rate and Rhythm: Normal rate and regular rhythm.      Pulses: Normal pulses.      Heart sounds: Normal heart sounds. No murmur heard.    No friction rub. No gallop.   Pulmonary:      Effort: Pulmonary effort is normal.      Breath sounds: Normal breath sounds. No wheezing, rhonchi or rales.   Abdominal:      General: Abdomen is flat. Bowel sounds are normal. There is no distension.      Palpations: Abdomen is soft.      Tenderness: There is no abdominal tenderness.   Musculoskeletal:         General: No swelling or tenderness. Normal range of motion.      Cervical back: Normal range of motion and neck supple. No tenderness.      Right lower leg: No edema.      Left lower leg: No edema.   Lymphadenopathy:      Cervical: No cervical adenopathy.   Skin:     Findings: No lesion or  rash.   Neurological:      General: No focal deficit present.      Mental Status: She is alert.      Cranial Nerves: No cranial nerve deficit.      Sensory: No sensory deficit.      Motor: No weakness.       Significant Labs: All pertinent labs within the past 24 hours have been reviewed.    Significant Imaging: I have reviewed all pertinent imaging results/findings within the past 24 hours.      Assessment/Plan:      Anemia  Hb has slightly trended down, 7.4 this morning  Patient is asymptomatic  Will monitor daily  This is likely due to blood loss from surgery but will check stool for occult blood as well       Closed left hip fracture  S/p repair per Ortho  Continue PT  Percocet decreased to every 6 h due to increased somnolence       Ataxic gait  Unclear etiology  CT and MRI head negative  Now that blood pressure is better controlled, will evaluate for any improvement   PT following, will likely need rehab   Pain is controlled on Percocet scheduled and morphine PRN however patient does appear more somnolent today, will decrease frequency of Percocet to every 6 h         Hypertension  No PMH of HTN, not taking any medications  Blood pressure better controlled since starting amlodipine   Conitnue amlodipine 5 mg QD  Continue hydralazine PRN for BP > 180/100, has not been required       Rheumatoid arthritis  Continue home medications       Fibromyalgia  Continue home medications       Early onset Alzheimer's disease with behavioral disturbance  A little more confused from baseline, likely related to hospitalization and opioids  Will decrease frequency of Percocet   Continue home medications         VTE Risk Mitigation (From admission, onward)         Ordered     enoxaparin injection 40 mg  Daily         01/07/23 1446     IP VTE HIGH RISK PATIENT  Once         01/04/23 2155     Place sequential compression device  Until discontinued         01/04/23 2155                Discharge Planning   DANIEL:      Code Status: DNR    Is the patient medically ready for discharge?:     Reason for patient still in hospital (select all that apply): Treatment  Discharge Plan A: Rehab                  Cindy Almaguer MD  Department of Hospital Medicine   [facility] - 4th Floor Medical Telemetry

## 2023-01-10 NOTE — ASSESSMENT & PLAN NOTE
Unclear etiology  CT and MRI head negative  Now that blood pressure is better controlled, will evaluate for any improvement   PT following, will likely need rehab   Pain is controlled on Percocet scheduled and morphine PRN however patient does appear more somnolent today, will decrease frequency of Percocet to every 6 h

## 2023-01-10 NOTE — ASSESSMENT & PLAN NOTE
A little more confused from baseline, likely related to hospitalization and opioids  Will decrease frequency of Percocet   Continue home medications

## 2023-01-10 NOTE — NURSING
[facility] - 4th Floor Medical Telemetry  Wound Care    Patient Name:  Gisele Meraz   MRN:  [id number]  Date: 1/10/2023  Diagnosis: <principal problem not specified>    History:     Past Medical History:   Diagnosis Date    Acid reflux     Arthritis     Dementia     Patient on Memantine BID    Fibromyalgia     Pneumonia     RA (rheumatoid arthritis)        Social History     Socioeconomic History    Marital status:    Tobacco Use    Smoking status: Former     Types: Cigarettes    Smokeless tobacco: Never   Substance and Sexual Activity    Alcohol use: Not Currently    Drug use: Never       Precautions:     Allergies as of 01/04/2023 - Reviewed 01/04/2023   Allergen Reaction Noted    Penicillin Hives 05/16/2022    Penicillins Swelling 02/20/2018       WOC Assessment Details/Treatment   Patient daughter in-law at bedside, with multiple complaints. Informed daughter in law what wound care role is in care. States understanding.        01/10/23 1107   WOCN Assessment   WOCN Total Time (mins) 60   Visit Date 01/10/23   Visit Time 1107   Consult Type New   WOCN Speciality Wound   Wound skin tear   Intervention assessed;changed;applied;chart review   Teaching on-going   Positioning   Head of Bed (HOB) Positioning HOB at 30-45 degrees   Positioning/Transfer Devices aBduction splint/pillow        Incision/Site 01/07/23 1434 Left Elbow   Date First Assessed/Time First Assessed: 01/07/23 1434   Side: Left  Location: Elbow   Wound Image    Dressing Appearance Intact   Drainage Amount Small   Drainage Characteristics/Odor Serosanguineous   Appearance Not granulating   Red (%), Wound Tissue Color 75 %   Yellow (%), Wound Tissue Color 25 %   Periwound Area Intact;Dry   Wound Edges Irregular   Wound Length (cm) 2.6 cm   Wound Width (cm) 5.2 cm   Wound Depth (cm) 0.1 cm   Wound Volume (cm^3) 1.352 cm^3   Wound Surface Area (cm^2) 13.52 cm^2   Care Sterile normal saline   Dressing Applied  (Non adherent, silver  alginate, gauze, kelix, cloth tape)      (Insert flowsheet data here)    Recommendations made to primary team for wound care for non adherent to wound bed, cover with silver alginate, gauze, wrap with kerlix, secure with tape. Change every other day and as needed, keep left arm elevated as tolerated . Orders placed.     01/10/2023

## 2023-01-10 NOTE — PROGRESS NOTES
"Inpatient Nutrition Evaluation    Admit Date: 1/4/2023   Total duration of encounter: 6 days    Nutrition Recommendation/Prescription     Continue regular diet as tolerated  RD to order vanilla boost TID to provide an additional 240 kcal and 10 g protein per container  RD to monitor po intake and weight changes    Nutrition Assessment     Chart Review    Reason Seen: length of stay    Malnutrition Screening Tool Results   Have you recently lost weight without trying?: No  Have you been eating poorly because of a decreased appetite?: No   MST Score: 0     Diagnosis:  Anemia  Closed L hip fracture s/p ORIF  Ataxic gait  HTN  Early onset Alzheimer's disease with behavioral disturbance    Relevant Medical History: Alzheimer disease, RA, fibromyalgia     Nutrition-Related Medications: Zofran PRN, Protonix daily, Miralax daily, Prednisone daily    Nutrition-Related Labs: 1/10: RBC-2.49, H/H-7.4/23.3, cecy-8.3      Diet Order: Diet Adult Regular  Oral Supplement Order: Boost  Appetite/Oral Intake: poor/0-25% of meals  Factors Affecting Nutritional Intake: constipation  Food/Quaker/Cultural Preferences: none reported  Food Allergies: none reported    Skin Integrity: skin tear, incision  Wound(s):       Comments    1/10: spoke with daughter, who reports poor appetite this AM, eating couple bites, with good appetite prior. Agreed to ONS. Pt with constipation, no BM x 7 days. Daughter unsure of weight loss at this time. Weight of 74.8 kg (165 lb) on 1/5 and previous weight of 160 lb on 8/22. Weight stable at this time, will continue to monitor.    Anthropometrics    Height: 5' 4" (162.6 cm) Height Method: Stated  Last Weight: 74.8 kg (165 lb) (01/05/23 1000) Weight Method: Standard Scale  BMI (Calculated): 28.3  BMI Classification: overweight (BMI 25-29.9)     Ideal Body Weight (IBW), Female: 120 lb     % Ideal Body Weight, Female (lb): 137.5 %                             Usual Weight Provided By: EMR weight history    Wt " Readings from Last 3 Encounters:   01/05/23 1000 74.8 kg (165 lb)   01/04/23 0928 74.8 kg (165 lb)   08/22/22 2135 72.6 kg (160 lb)   08/18/22 1030 72.3 kg (159 lb 6.4 oz)      Weight Change(s) Since Admission:  Admit Weight: 74.8 kg (165 lb) (01/04/23 0928)      Patient Education    Not applicable.    Monitoring & Evaluation     Dietitian will monitor food and beverage intake and weight change.  Nutrition Risk/Follow-Up: low (follow-up in 5-7 days)  Patients assigned 'low nutrition risk' status do not qualify for a full nutritional assessment but will be monitored and re-evaluated in a 5-7 day time period. Please consult if re-evaluation needed sooner.    Carmita Miranda, Registration Eligible, Provisional LDN

## 2023-01-10 NOTE — SUBJECTIVE & OBJECTIVE
Interval History: Pain better controlled since switching Percocet to scheduled however patient is more somnolent. Still has not had BM. Denies abdominal pain.     Review of Systems   Constitutional:  Negative for chills, fatigue and fever.   HENT:  Negative for congestion, rhinorrhea and sore throat.    Respiratory:  Negative for cough, chest tightness and shortness of breath.    Cardiovascular:  Negative for chest pain, palpitations and leg swelling.   Gastrointestinal:  Positive for constipation. Negative for abdominal distention, abdominal pain, diarrhea, nausea and vomiting.   Genitourinary:  Negative for dysuria.   Musculoskeletal:  Negative for arthralgias, back pain and joint swelling.   Neurological:  Negative for dizziness and headaches.   Psychiatric/Behavioral:  Negative for agitation and confusion.    Objective:     Vital Signs (Most Recent):  Temp: 98.1 °F (36.7 °C) (01/10/23 0823)  Pulse: 87 (01/10/23 0823)  Resp: 20 (01/10/23 1616)  BP: 113/65 (01/10/23 0823)  SpO2: (!) 90 % (01/10/23 0823)   Vital Signs (24h Range):  Temp:  [97.9 °F (36.6 °C)-98.9 °F (37.2 °C)] 98.1 °F (36.7 °C)  Pulse:  [87-93] 87  Resp:  [17-20] 20  SpO2:  [89 %-94 %] 90 %  BP: (113-131)/(62-77) 113/65     Weight: 74.8 kg (165 lb)  Body mass index is 28.32 kg/m².  No intake or output data in the 24 hours ending 01/10/23 1728   Physical Exam  Constitutional:       General: She is not in acute distress.     Appearance: Normal appearance.   HENT:      Head: Normocephalic and atraumatic.   Eyes:      Extraocular Movements: Extraocular movements intact.      Pupils: Pupils are equal, round, and reactive to light.   Cardiovascular:      Rate and Rhythm: Normal rate and regular rhythm.      Pulses: Normal pulses.      Heart sounds: Normal heart sounds. No murmur heard.    No friction rub. No gallop.   Pulmonary:      Effort: Pulmonary effort is normal.      Breath sounds: Normal breath sounds. No wheezing, rhonchi or rales.   Abdominal:       General: Abdomen is flat. Bowel sounds are normal. There is no distension.      Palpations: Abdomen is soft.      Tenderness: There is no abdominal tenderness.   Musculoskeletal:         General: No swelling or tenderness. Normal range of motion.      Cervical back: Normal range of motion and neck supple. No tenderness.      Right lower leg: No edema.      Left lower leg: No edema.   Lymphadenopathy:      Cervical: No cervical adenopathy.   Skin:     Findings: No lesion or rash.   Neurological:      General: No focal deficit present.      Mental Status: She is alert.      Cranial Nerves: No cranial nerve deficit.      Sensory: No sensory deficit.      Motor: No weakness.       Significant Labs: All pertinent labs within the past 24 hours have been reviewed.    Significant Imaging: I have reviewed all pertinent imaging results/findings within the past 24 hours.

## 2023-01-10 NOTE — PT/OT/SLP PROGRESS
Occupational Therapy   Treatment    Name: Gisele Meraz  MRN: [id number]  Admitting Diagnosis: ataxic gait, HTN, RA, fibromyalgia, early onset Alzheimer's disease with behavioral disturbance  Recent Surgery: Procedure(s) (LRB):  HEMIARTHROPLASTY, HIP (Left) 3 Days Post-Op    Recommendations:     Discharge Recommendations: nursing facility, skilled vs rehab facility (TBD, pending progress)  Discharge Equipment Recommendations: bedside commode, dressing devices, shower chair vs TTB (TBD, pending progress)  Barriers to discharge: medical dx, placement    Assessment:     Gisele Meraz is a 67 y.o. female with a medical diagnosis of ataxic gait, HTN, RA, fibromyalgia, early onset Alzheimer's disease with behavioral disturbance. She presents lethargic and with c/o pain. Performance deficits affecting function are weakness, impaired endurance, impaired self care skills, impaired functional mobility, gait instability, impaired balance, impaired cognition, decreased lower extremity function, decreased safety awareness, pain, orthopedic precautions.     Rehab Prognosis: Fair; patient would benefit from acute skilled OT services to address these deficits and reach maximum level of function.       Plan:     Patient to be seen 6 x/week to address the above listed problems via self-care/home management, therapeutic activities, therapeutic exercises  Plan of Care Expires: 01/23/23  Plan of Care Reviewed with: patient (DIL)    Subjective     Pain/Comfort:  Pt c/o pain in chest and BLEs (specifically in LLE later on during session). RN notified.    Objective:     Communicated with: RN prior to session. Patient found HOB elevated with bed alarm on, hip abduction pillow, telemetry, [place] roll belt upon OT entry to room.    General Precautions: Standard, fall, L hip pxns (no internal or external rotation x6 weeks otherwise ROMAT with abduction pillow while in bed per MD)  Orthopedic Precautions: LLE WBAT  Braces: Hip abduction pillow  while in bed  Respiratory Status: Room air  Vitals:  O2: Pt's NC was doffed upon arrival, and pt's O2 sat level was measuring in 80s. Pt's NC was re-donned with pt receiving 1.5L/NC, and pt's O2 increasd to WNL. RN notified.  BP: 122/77  ID: 84     Occupational Performance:     Bed Mobility/Functional Mobility/Transfers:    Unable to perform 2/2 pt's lethargy and pain level.    Activities of Daily Living:  Feeding:  Pt required SBA and encouragement to perform task during session.  Grooming: Pt rinsed face using wash cloth with setup/clean up A provided while lying with HOB elevated.  LB dressing: Pt required dep A to doff/don B socks.    Treatment & Education:  Pt's DIL voiced many concerns during session (pt's increased lethargy, that pt hasn't been turned in bed, wedge hasn't been utilized, etc.). OT addressed pt's DIL's concerns and spoke to pt's RN about them. RN also entered pt's room during session to speak with pt and pt's DIL. OT educated pt's DIL on therapeutic POC for today 2/2 pt's current state and pain level.   OT also donned B SCDs and pressure relief boots, re-donned abduction pillow between BLEs, and assisted pt with proper positioning in bed and rolling toward R side in order to position wedge under L aspect of pt's trunk/buttock to facilitate pt's skin integrity.    Patient left HOB elevated with [place] roll belt, all lines intact, call button in reach, bed alarm on, RN notified, pt's DIL and wound care present, B pressure boots and SCDs donned, abduction pillow re-donned, and wedge under L trunk/buttock.    GOALS:   Multidisciplinary Problems       Occupational Therapy Goals          Problem: Occupational Therapy    Goal Priority Disciplines Outcome Interventions   Occupational Therapy Goal     OT, PT/OT Ongoing, Progressing    Description: Goals to be met by: 1/23/23     Patient will increase functional independence with ADLs by performing:    UE Dressing with Stand-by Assistance.  LE Dressing  with Minimal Assistance and Assistive Devices as needed.  Toileting from bedside commode with Minimal Assistance for hygiene and clothing management.                          Time Tracking:     OT Date of Treatment: 1/10/23  OT Start Time: 1009  OT Stop Time: 1107  OT Total Time (min): 58 min    Billable Minutes: Self Care/Home Management 48 mins  Conference 10 mins    OT/VICTORIA: OT     VICTORIA Visit Number: 1    1/10/2023

## 2023-01-11 PROBLEM — K59.00 CONSTIPATION: Status: ACTIVE | Noted: 2023-01-11

## 2023-01-11 LAB
ABO + RH BLD: NORMAL
ABO + RH BLD: NORMAL
BASOPHILS # BLD AUTO: 0.03 X10(3)/MCL (ref 0–0.2)
BASOPHILS NFR BLD AUTO: 0.3 %
BLD PROD TYP BPU: NORMAL
BLD PROD TYP BPU: NORMAL
BLOOD UNIT EXPIRATION DATE: NORMAL
BLOOD UNIT EXPIRATION DATE: NORMAL
BLOOD UNIT TYPE CODE: 5100
BLOOD UNIT TYPE CODE: 5100
CROSSMATCH INTERPRETATION: NORMAL
CROSSMATCH INTERPRETATION: NORMAL
DISPENSE STATUS: NORMAL
DISPENSE STATUS: NORMAL
EOSINOPHIL # BLD AUTO: 0.16 X10(3)/MCL (ref 0–0.9)
EOSINOPHIL NFR BLD AUTO: 1.8 %
ERYTHROCYTE [DISTWIDTH] IN BLOOD BY AUTOMATED COUNT: 12.7 % (ref 11.5–17)
HCT VFR BLD AUTO: 25.6 % (ref 37–47)
HGB BLD-MCNC: 8 GM/DL (ref 12–16)
IMM GRANULOCYTES # BLD AUTO: 0.05 X10(3)/MCL (ref 0–0.04)
IMM GRANULOCYTES NFR BLD AUTO: 0.6 %
LYMPHOCYTES # BLD AUTO: 0.63 X10(3)/MCL (ref 0.6–4.6)
LYMPHOCYTES NFR BLD AUTO: 7.1 %
MCH RBC QN AUTO: 29.2 PG
MCHC RBC AUTO-ENTMCNC: 31.3 MG/DL (ref 33–36)
MCV RBC AUTO: 93.4 FL (ref 80–94)
MONOCYTES # BLD AUTO: 0.96 X10(3)/MCL (ref 0.1–1.3)
MONOCYTES NFR BLD AUTO: 10.8 %
NEUTROPHILS # BLD AUTO: 7.03 X10(3)/MCL (ref 2.1–9.2)
NEUTROPHILS NFR BLD AUTO: 79.4 %
NRBC BLD AUTO-RTO: 0 %
PLATELET # BLD AUTO: 212 X10(3)/MCL (ref 130–400)
PMV BLD AUTO: 11.3 FL (ref 7.4–10.4)
RBC # BLD AUTO: 2.74 X10(6)/MCL (ref 4.2–5.4)
UNIT NUMBER: NORMAL
UNIT NUMBER: NORMAL
WBC # SPEC AUTO: 8.9 X10(3)/MCL (ref 4.5–11.5)

## 2023-01-11 PROCEDURE — 21400001 HC TELEMETRY ROOM

## 2023-01-11 PROCEDURE — 99233 PR SUBSEQUENT HOSPITAL CARE,LEVL III: ICD-10-PCS | Mod: ,,, | Performed by: STUDENT IN AN ORGANIZED HEALTH CARE EDUCATION/TRAINING PROGRAM

## 2023-01-11 PROCEDURE — 85025 COMPLETE CBC W/AUTO DIFF WBC: CPT | Performed by: STUDENT IN AN ORGANIZED HEALTH CARE EDUCATION/TRAINING PROGRAM

## 2023-01-11 PROCEDURE — 97530 THERAPEUTIC ACTIVITIES: CPT | Mod: CQ

## 2023-01-11 PROCEDURE — 36415 COLL VENOUS BLD VENIPUNCTURE: CPT | Performed by: STUDENT IN AN ORGANIZED HEALTH CARE EDUCATION/TRAINING PROGRAM

## 2023-01-11 PROCEDURE — 25000003 PHARM REV CODE 250: Performed by: INTERNAL MEDICINE

## 2023-01-11 PROCEDURE — 94761 N-INVAS EAR/PLS OXIMETRY MLT: CPT

## 2023-01-11 PROCEDURE — 97535 SELF CARE MNGMENT TRAINING: CPT

## 2023-01-11 PROCEDURE — 25000003 PHARM REV CODE 250: Performed by: PHYSICIAN ASSISTANT

## 2023-01-11 PROCEDURE — 63600175 PHARM REV CODE 636 W HCPCS: Performed by: STUDENT IN AN ORGANIZED HEALTH CARE EDUCATION/TRAINING PROGRAM

## 2023-01-11 PROCEDURE — 25000003 PHARM REV CODE 250: Performed by: STUDENT IN AN ORGANIZED HEALTH CARE EDUCATION/TRAINING PROGRAM

## 2023-01-11 PROCEDURE — 27000221 HC OXYGEN, UP TO 24 HOURS

## 2023-01-11 PROCEDURE — 63600175 PHARM REV CODE 636 W HCPCS: Performed by: PHYSICIAN ASSISTANT

## 2023-01-11 PROCEDURE — 99233 SBSQ HOSP IP/OBS HIGH 50: CPT | Mod: ,,, | Performed by: STUDENT IN AN ORGANIZED HEALTH CARE EDUCATION/TRAINING PROGRAM

## 2023-01-11 RX ADMIN — LEFLUNOMIDE 20 MG: 20 TABLET, FILM COATED ORAL at 12:01

## 2023-01-11 RX ADMIN — PREDNISONE 5 MG: 5 TABLET ORAL at 09:01

## 2023-01-11 RX ADMIN — OXYCODONE AND ACETAMINOPHEN 1 TABLET: 10; 325 TABLET ORAL at 09:01

## 2023-01-11 RX ADMIN — OXYCODONE HYDROCHLORIDE AND ACETAMINOPHEN 1 TABLET: 5; 325 TABLET ORAL at 08:01

## 2023-01-11 RX ADMIN — METHOCARBAMOL TABLETS 750 MG: 750 TABLET, COATED ORAL at 02:01

## 2023-01-11 RX ADMIN — DULOXETINE 60 MG: 30 CAPSULE, DELAYED RELEASE ORAL at 09:01

## 2023-01-11 RX ADMIN — DONEPEZIL HYDROCHLORIDE 10 MG: 5 TABLET, FILM COATED ORAL at 09:01

## 2023-01-11 RX ADMIN — MORPHINE SULFATE 2 MG: 4 INJECTION INTRAVENOUS at 07:01

## 2023-01-11 RX ADMIN — METHOCARBAMOL TABLETS 750 MG: 750 TABLET, COATED ORAL at 09:01

## 2023-01-11 RX ADMIN — PANTOPRAZOLE SODIUM 40 MG: 40 TABLET, DELAYED RELEASE ORAL at 09:01

## 2023-01-11 RX ADMIN — OXYCODONE HYDROCHLORIDE AND ACETAMINOPHEN 1 TABLET: 5; 325 TABLET ORAL at 05:01

## 2023-01-11 RX ADMIN — MORPHINE SULFATE 2 MG: 4 INJECTION INTRAVENOUS at 05:01

## 2023-01-11 RX ADMIN — AMLODIPINE BESYLATE 2.5 MG: 2.5 TABLET ORAL at 09:01

## 2023-01-11 RX ADMIN — MEMANTINE 10 MG: 5 TABLET ORAL at 09:01

## 2023-01-11 RX ADMIN — ENOXAPARIN SODIUM 40 MG: 40 INJECTION SUBCUTANEOUS at 05:01

## 2023-01-11 RX ADMIN — QUETIAPINE FUMARATE 25 MG: 25 TABLET ORAL at 09:01

## 2023-01-11 RX ADMIN — POLYETHYLENE GLYCOL 3350 17 G: 17 POWDER, FOR SOLUTION ORAL at 09:01

## 2023-01-11 RX ADMIN — OXYCODONE AND ACETAMINOPHEN 1 TABLET: 10; 325 TABLET ORAL at 06:01

## 2023-01-11 RX ADMIN — OXYCODONE HYDROCHLORIDE AND ACETAMINOPHEN 1 TABLET: 5; 325 TABLET ORAL at 12:01

## 2023-01-11 NOTE — NURSING
Left elbow skin tear wound care administered per WCN orders, pt tolerated well, next drsg change due 1/13.

## 2023-01-11 NOTE — ASSESSMENT & PLAN NOTE
S/p repair per Ortho  Continue PT  Percocet every 6 h and morphine 2 mg Q 4 h PRN for pain control

## 2023-01-11 NOTE — TELEPHONE ENCOUNTER
Called dtr. Discussed recent events.   BP has been up and down recently, no apparent reason. Had not fallen at home. Had ataxic gait in ED. Also had reports of dizziness, which had prompted the BP checks.  Fell in ED while awaiting room. Fractured hip. Required surgery.   More confused.   Discussed recent brain imaging. Bit more atrophy now (scan done in [facility]) when compared to scan 2019.  Can see ataxia in more advanced dementia but I am not sure this is what caused recent ataxia.   Discussed possible visual spatial changes in dementia.  Discussed hospital setting, anesthesia, pain, meds (including pain meds) that can all contribute to increased confusion or even hallucinations.   Plan at this point is to go to SNF for rehab.  They will message me if they would like to do VV after discharge.

## 2023-01-11 NOTE — ASSESSMENT & PLAN NOTE
Unclear etiology  CT and MRI head negative  Now that blood pressure is better controlled, will evaluate for any improvement   PT following, will likely need rehab   Pain is controlled on Percocet scheduled and morphine PRN

## 2023-01-11 NOTE — ASSESSMENT & PLAN NOTE
A little more confused from baseline, likely related to hospitalization and opioids  Confusion has improved some since decreasing frequency of Percocet  Continue home medications

## 2023-01-11 NOTE — SUBJECTIVE & OBJECTIVE
Interval History: Patient complaining of pain this morning, it was time for her next dose of Percocet during rounding. Per daughter in law pain does improve after taking medication. Patient still has not had a bowel movement, received one dose of methylnaltrexone last night.     Review of Systems   Constitutional:  Negative for chills, fatigue and fever.   HENT:  Negative for congestion, rhinorrhea and sore throat.    Respiratory:  Negative for cough, chest tightness and shortness of breath.    Cardiovascular:  Negative for chest pain, palpitations and leg swelling.   Gastrointestinal:  Positive for constipation. Negative for abdominal distention, abdominal pain, diarrhea, nausea and vomiting.   Genitourinary:  Negative for dysuria.   Musculoskeletal:  Negative for arthralgias, back pain and joint swelling.   Neurological:  Negative for dizziness and headaches.   Psychiatric/Behavioral:  Negative for agitation and confusion.    Objective:     Vital Signs (Most Recent):  Temp: 98.7 °F (37.1 °C) (01/11/23 1526)  Pulse: 97 (01/11/23 1526)  Resp: (!) 22 (01/11/23 1231)  BP: 106/68 (01/11/23 1526)  SpO2: (!) 93 % (01/11/23 0325)   Vital Signs (24h Range):  Temp:  [97.9 °F (36.6 °C)-98.7 °F (37.1 °C)] 98.7 °F (37.1 °C)  Pulse:  [] 97  Resp:  [17-22] 22  SpO2:  [92 %-95 %] 93 %  BP: (106-181)/(68-92) 106/68     Weight: 74.8 kg (165 lb)  Body mass index is 28.32 kg/m².    Intake/Output Summary (Last 24 hours) at 1/11/2023 1710  Last data filed at 1/11/2023 1556  Gross per 24 hour   Intake 500 ml   Output 1500 ml   Net -1000 ml      Physical Exam  Constitutional:       General: She is not in acute distress.     Appearance: Normal appearance.   HENT:      Head: Normocephalic and atraumatic.   Eyes:      Extraocular Movements: Extraocular movements intact.      Pupils: Pupils are equal, round, and reactive to light.   Cardiovascular:      Rate and Rhythm: Normal rate and regular rhythm.      Pulses: Normal pulses.       Heart sounds: Normal heart sounds. No murmur heard.    No friction rub. No gallop.   Pulmonary:      Effort: Pulmonary effort is normal.      Breath sounds: Normal breath sounds. No wheezing, rhonchi or rales.   Abdominal:      General: Abdomen is flat. Bowel sounds are normal. There is no distension.      Palpations: Abdomen is soft.      Tenderness: There is no abdominal tenderness.   Musculoskeletal:         General: No swelling or tenderness. Normal range of motion.      Cervical back: Normal range of motion and neck supple. No tenderness.      Right lower leg: No edema.      Left lower leg: No edema.   Lymphadenopathy:      Cervical: No cervical adenopathy.   Skin:     Findings: No lesion or rash.   Neurological:      General: No focal deficit present.      Mental Status: She is alert.      Cranial Nerves: No cranial nerve deficit.      Sensory: No sensory deficit.      Motor: No weakness.       Significant Labs: All pertinent labs within the past 24 hours have been reviewed.    Significant Imaging: I have reviewed all pertinent imaging results/findings within the past 24 hours.

## 2023-01-11 NOTE — PROGRESS NOTES
[facility] - 4th Floor [facility] Medicine  Progress Note    Patient Name: Gisele Meraz  MRN: [id number]  Patient Class: IP- Inpatient   Admission Date: 1/4/2023  Length of Stay: 7 days  Attending Physician: Cindy Smith MD  Primary Care Provider: Cindy Almaguer MD        Subjective:     Principal Problem:<principal problem not specified>        HPI:  Ms Jaquez is a 66 y/o female patient with past medical history significant for Alzheimer disease, fibromyalgia, rheumatoid arthritis who presented to ED with complaint of elevated blood pressure. Most of history is obtained from daughter in law who is primary caregiver. She states patient started feeling dizzy Tuesday night, they called office and labs were done which showed elevated BUN and creatinine, urine dipstick was obtained at home which was positive for leukocyte esterase. Wednesday morning blood pressure was checked at home and was 200/91, they called office and were instructed to go to ED. Patient denies history of hypertension and is not taking any medications. In ED blood pressure responded to amlodipine and IV hydralazine. Per daughter in law patient was going to be discharged however she was noted to sway towards the right side when walking. CT and MRI head were negative for ischemic stroke. Blood pressure remains elevated but improved. UA was obtained in ED which was negative.       Overview/Hospital Course:  No notes on file    Interval History: Patient complaining of pain this morning, it was time for her next dose of Percocet during rounding. Per daughter in law pain does improve after taking medication. Patient still has not had a bowel movement, received one dose of methylnaltrexone last night.     Review of Systems   Constitutional:  Negative for chills, fatigue and fever.   HENT:  Negative for congestion, rhinorrhea and sore throat.    Respiratory:  Negative for cough, chest tightness and shortness  of breath.    Cardiovascular:  Negative for chest pain, palpitations and leg swelling.   Gastrointestinal:  Positive for constipation. Negative for abdominal distention, abdominal pain, diarrhea, nausea and vomiting.   Genitourinary:  Negative for dysuria.   Musculoskeletal:  Negative for arthralgias, back pain and joint swelling.   Neurological:  Negative for dizziness and headaches.   Psychiatric/Behavioral:  Negative for agitation and confusion.    Objective:     Vital Signs (Most Recent):  Temp: 98.7 °F (37.1 °C) (01/11/23 1526)  Pulse: 97 (01/11/23 1526)  Resp: (!) 22 (01/11/23 1231)  BP: 106/68 (01/11/23 1526)  SpO2: (!) 93 % (01/11/23 0325)   Vital Signs (24h Range):  Temp:  [97.9 °F (36.6 °C)-98.7 °F (37.1 °C)] 98.7 °F (37.1 °C)  Pulse:  [] 97  Resp:  [17-22] 22  SpO2:  [92 %-95 %] 93 %  BP: (106-181)/(68-92) 106/68     Weight: 74.8 kg (165 lb)  Body mass index is 28.32 kg/m².    Intake/Output Summary (Last 24 hours) at 1/11/2023 1710  Last data filed at 1/11/2023 1556  Gross per 24 hour   Intake 500 ml   Output 1500 ml   Net -1000 ml      Physical Exam  Constitutional:       General: She is not in acute distress.     Appearance: Normal appearance.   HENT:      Head: Normocephalic and atraumatic.   Eyes:      Extraocular Movements: Extraocular movements intact.      Pupils: Pupils are equal, round, and reactive to light.   Cardiovascular:      Rate and Rhythm: Normal rate and regular rhythm.      Pulses: Normal pulses.      Heart sounds: Normal heart sounds. No murmur heard.    No friction rub. No gallop.   Pulmonary:      Effort: Pulmonary effort is normal.      Breath sounds: Normal breath sounds. No wheezing, rhonchi or rales.   Abdominal:      General: Abdomen is flat. Bowel sounds are normal. There is no distension.      Palpations: Abdomen is soft.      Tenderness: There is no abdominal tenderness.   Musculoskeletal:         General: No swelling or tenderness. Normal range of motion.       Cervical back: Normal range of motion and neck supple. No tenderness.      Right lower leg: No edema.      Left lower leg: No edema.   Lymphadenopathy:      Cervical: No cervical adenopathy.   Skin:     Findings: No lesion or rash.   Neurological:      General: No focal deficit present.      Mental Status: She is alert.      Cranial Nerves: No cranial nerve deficit.      Sensory: No sensory deficit.      Motor: No weakness.       Significant Labs: All pertinent labs within the past 24 hours have been reviewed.    Significant Imaging: I have reviewed all pertinent imaging results/findings within the past 24 hours.      Assessment/Plan:      Constipation  Not responding to methylnaltrexone  Will add Colace to Miralax       Anemia  Hb improving, 8.0 today  Patient is asymptomatic  Will monitor daily  This is likely due to blood loss from surgery       Closed left hip fracture  S/p repair per Ortho  Continue PT  Percocet every 6 h and morphine 2 mg Q 4 h PRN for pain control       Ataxic gait  Unclear etiology  CT and MRI head negative  Now that blood pressure is better controlled, will evaluate for any improvement   PT following, will likely need rehab   Pain is controlled on Percocet scheduled and morphine PRN        Hypertension  No PMH of HTN, not taking any medications  Blood pressure better controlled since starting amlodipine   Conitnue amlodipine 5 mg QD  Continue hydralazine PRN for BP > 180/100, has not been required       Rheumatoid arthritis  Continue home medications       Fibromyalgia  Continue home medications       Early onset Alzheimer's disease with behavioral disturbance  A little more confused from baseline, likely related to hospitalization and opioids  Confusion has improved some since decreasing frequency of Percocet  Continue home medications         VTE Risk Mitigation (From admission, onward)         Ordered     enoxaparin injection 40 mg  Daily         01/07/23 1446     IP VTE HIGH RISK  PATIENT  Once         01/04/23 2155     Place sequential compression device  Until discontinued         01/04/23 2155                Discharge Planning   DANIEL:      Code Status: DNR   Is the patient medically ready for discharge?:     Reason for patient still in hospital (select all that apply): Treatment  Discharge Plan A: Rehab                  Cindy Almaguer MD  Department of Hospital Medicine   [facility] - 4th Floor Medical Telemetry

## 2023-01-11 NOTE — ASSESSMENT & PLAN NOTE
Hb improving, 8.0 today  Patient is asymptomatic  Will monitor daily  This is likely due to blood loss from surgery

## 2023-01-11 NOTE — PT/OT/SLP PROGRESS
Physical Therapy Treatment    Patient Name:  Gisele Meraz   MRN:  [id number]    Recommendations:     Discharge Recommendations: nursing facility, skilled  Discharge Equipment Recommendations: bedside commode  Barriers to discharge: None    Assessment:     Gisele Meraz is a 67 y.o. female admitted with a medical diagnosis of <principal problem not specified>.  She presents with the following impairments/functional limitations: weakness, gait instability, impaired endurance, impaired balance, decreased safety awareness, impaired cognition, pain, impaired functional mobility .    Rehab Prognosis: Good; patient would benefit from acute skilled PT services to address these deficits and reach maximum level of function.    Recent Surgery: Procedure(s) (LRB):  HEMIARTHROPLASTY, HIP (Left) 4 Days Post-Op    Plan:     During this hospitalization, patient to be seen 6 x/week to address the identified rehab impairments via therapeutic activities and progress toward the following goals:    Plan of Care Expires:  02/07/23    Subjective     Chief Complaint:   Patient/Family Comments/goals:   Pain/Comfort:         Objective:     Communicated with nurse prior to session.  Patient found up in chair with pulse ox (continuous), telemetry, oxygen, peripheral IV upon PT entry to room.     General Precautions: Standard, fall  Orthopedic Precautions: LLE weight bearing as tolerated  Braces: N/A  Respiratory Status: Nasal cannula, flow 2 L/min     Functional Mobility:  Transfers:     Sit to Stand:  moderate assistance with rolling walker x4 trials   Pt with noted orthostatic hypotension (83/51) in standing. Nursing informed.       AM-PAC 6 CLICK MOBILITY          Treatment & Education:  Discussed with BELL orthostatic hypotension and activity tolerance. DIL agreed to leave pt up in bedside chair with nursing informed.     Patient left up in chair with all lines intact, call button in reach, and chair alarm on..    GOALS:   Multidisciplinary  Problems       Physical Therapy Goals          Problem: Physical Therapy    Goal Priority Disciplines Outcome Goal Variances Interventions   Physical Therapy Goal     PT, PT/OT Ongoing, Progressing     Description: Goals to be met by: 23     Patient will increase functional independence with mobility by performin. Supine to sit with Stand-by Assistance  2. Sit to supine with Stand-by Assistance  3. Sit to stand transfer Minimal Assistance and RW  4. Gait  x100 feet with Minimal Assistance and RW                          Time Tracking:     PT Received On: 23  PT Start Time: 1412     PT Stop Time: 1446  PT Total Time (min): 34 min     Billable Minutes: Therapeutic Activity 34    Treatment Type: Treatment  PT/PTA: PTA     PTA Visit Number: 3     2023

## 2023-01-11 NOTE — PT/OT/SLP PROGRESS
Occupational Therapy   Treatment    Name: Gisele Meraz  MRN: [id number]  Admitting Diagnosis: ataxic gait, HTN, RA, fibromyalgia, early onset Alzheimer's disease with behavioral disturbance  Recent Surgery: Procedure(s) (LRB):  HEMIARTHROPLASTY, HIP (Left) 4 Days Post-Op    Recommendations:     Discharge Recommendations: nursing facility, skilled vs rehab facility (TBD, pending progress)  Discharge Equipment Recommendations: bedside commode, dressing devices, shower chair vs TTB (TBD, pending progress)  Barriers to discharge: placement    Assessment:     Gisele Meraz is a 67 y.o. female with a medical diagnosis of ataxic gait, HTN, RA, fibromyalgia, early onset Alzheimer's disease with behavioral disturbance. She presents with c/o pain in LLE. Performance deficits affecting function are weakness, impaired endurance, impaired self care skills, impaired functional mobility, gait instability, impaired balance, impaired cognition, decreased lower extremity function, decreased safety awareness, pain, orthopedic precautions.     Rehab Prognosis: Fair; patient would benefit from acute skilled OT services to address these deficits and reach maximum level of function.       Plan:     Patient to be seen 6 x/week to address the above listed problems via self-care/home management, therapeutic activities, therapeutic exercises  Plan of Care Expires: 01/23/23  Plan of Care Reviewed with: patient, son    Subjective     Pain/Comfort:  Pt c/o pain in LLE during session. RN administered pt's pain medication during session.    Objective:     Communicated with: RN prior to session. Patient found HOB elevated with hip abduction pillow, telemetry, and B pressure boots and SCDs donned upon OT entry to room.    General Precautions: Standard, fall, L hip pxns (no internal or external rotation x6 weeks otherwise ROMAT with abduction pillow while in bed per MD)  Orthopedic Precautions: LLE WBAT  Braces: Hip abduction pillow while in  bed  Respiratory Status: O2 via NC  Vitals:  O2: 97%  BP: 141/84 and 142/83. RN notified.  RI:      Occupational Performance:     Bed Mobility/Functional Mobility/Transfers:    Pt t/f from lying with HOB elevated to seated EOB with increased time, vcs, and max A provided.  Pt performed sit to stand from EOB with min A provided and from chair with mod A provided, using RW. Pt also required vcs for proper hand placement.  Pt t/f from Bed > Chair using step transfer technique and RW with min A and vcs provided for proper sequencing and walker mgmt.    Activities of Daily Living:  Toileting: Pt found with bladder accident, requiring dep A perform teri-care while standing using RW.  LB dressing: Pt required dep A to don diaper, using RW while standing.    Education:  OT provided education on proper breathing technique during session to conserve pt's energy and regulate pt's breath.     Patient left UIC with foam cushion, all lines intact, call button in reach, chair alarm on, RN notified, pt's DIL present, and pillow positioned underneath pt's LLE.    GOALS:   Multidisciplinary Problems       Occupational Therapy Goals          Problem: Occupational Therapy    Goal Priority Disciplines Outcome Interventions   Occupational Therapy Goal     OT, PT/OT Ongoing, Progressing    Description: Goals to be met by: 1/23/23     Patient will increase functional independence with ADLs by performing:    UE Dressing with Stand-by Assistance.  LE Dressing with Minimal Assistance and Assistive Devices as needed.  Toileting from bedside commode with Minimal Assistance for hygiene and clothing management.                          Time Tracking:     OT Date of Treatment: 1/11/23  OT Start Time: 1221  OT Stop Time: 1313  OT Total Time (min): 52 mins    Billable Minutes: Self Care/Home Management 52 mins    OT/VICTORIA: OT     VICTORIA Visit Number: 2    1/11/2023

## 2023-01-12 PROBLEM — I95.1 ORTHOSTATIC HYPOTENSION: Status: ACTIVE | Noted: 2023-01-12

## 2023-01-12 LAB
ESTROGEN SERPL-MCNC: NORMAL PG/ML
INSULIN SERPL-ACNC: NORMAL U[IU]/ML
LAB AP CLINICAL INFORMATION: NORMAL
LAB AP GROSS DESCRIPTION: NORMAL
LAB AP REPORT FOOTNOTES: NORMAL
T3RU NFR SERPL: NORMAL %

## 2023-01-12 PROCEDURE — 27000221 HC OXYGEN, UP TO 24 HOURS

## 2023-01-12 PROCEDURE — 97116 GAIT TRAINING THERAPY: CPT | Mod: CQ

## 2023-01-12 PROCEDURE — 63600175 PHARM REV CODE 636 W HCPCS: Performed by: STUDENT IN AN ORGANIZED HEALTH CARE EDUCATION/TRAINING PROGRAM

## 2023-01-12 PROCEDURE — 25000003 PHARM REV CODE 250: Performed by: STUDENT IN AN ORGANIZED HEALTH CARE EDUCATION/TRAINING PROGRAM

## 2023-01-12 PROCEDURE — 21400001 HC TELEMETRY ROOM

## 2023-01-12 PROCEDURE — 97530 THERAPEUTIC ACTIVITIES: CPT | Mod: CQ

## 2023-01-12 PROCEDURE — 25000003 PHARM REV CODE 250: Performed by: PHYSICIAN ASSISTANT

## 2023-01-12 PROCEDURE — 25000003 PHARM REV CODE 250: Performed by: INTERNAL MEDICINE

## 2023-01-12 PROCEDURE — 97535 SELF CARE MNGMENT TRAINING: CPT

## 2023-01-12 PROCEDURE — 63600175 PHARM REV CODE 636 W HCPCS: Performed by: PHYSICIAN ASSISTANT

## 2023-01-12 PROCEDURE — 99233 SBSQ HOSP IP/OBS HIGH 50: CPT | Mod: ,,, | Performed by: STUDENT IN AN ORGANIZED HEALTH CARE EDUCATION/TRAINING PROGRAM

## 2023-01-12 PROCEDURE — 99233 PR SUBSEQUENT HOSPITAL CARE,LEVL III: ICD-10-PCS | Mod: ,,, | Performed by: STUDENT IN AN ORGANIZED HEALTH CARE EDUCATION/TRAINING PROGRAM

## 2023-01-12 RX ORDER — SODIUM CHLORIDE 9 MG/ML
INJECTION, SOLUTION INTRAVENOUS CONTINUOUS
Status: DISCONTINUED | OUTPATIENT
Start: 2023-01-12 | End: 2023-01-18 | Stop reason: HOSPADM

## 2023-01-12 RX ADMIN — SENNOSIDES 8.6 MG: 8.6 TABLET, FILM COATED ORAL at 06:01

## 2023-01-12 RX ADMIN — DOCUSATE SODIUM 50 MG: 50 CAPSULE, LIQUID FILLED ORAL at 09:01

## 2023-01-12 RX ADMIN — SENNOSIDES 8.6 MG: 8.6 TABLET, FILM COATED ORAL at 09:01

## 2023-01-12 RX ADMIN — DULOXETINE 60 MG: 30 CAPSULE, DELAYED RELEASE ORAL at 09:01

## 2023-01-12 RX ADMIN — SODIUM CHLORIDE: 9 INJECTION, SOLUTION INTRAVENOUS at 05:01

## 2023-01-12 RX ADMIN — LEFLUNOMIDE 20 MG: 20 TABLET, FILM COATED ORAL at 09:01

## 2023-01-12 RX ADMIN — POLYETHYLENE GLYCOL 3350 17 G: 17 POWDER, FOR SOLUTION ORAL at 09:01

## 2023-01-12 RX ADMIN — OXYCODONE HYDROCHLORIDE AND ACETAMINOPHEN 1 TABLET: 5; 325 TABLET ORAL at 11:01

## 2023-01-12 RX ADMIN — PREDNISONE 5 MG: 5 TABLET ORAL at 09:01

## 2023-01-12 RX ADMIN — AMLODIPINE BESYLATE 2.5 MG: 2.5 TABLET ORAL at 09:01

## 2023-01-12 RX ADMIN — MEMANTINE 10 MG: 5 TABLET ORAL at 09:01

## 2023-01-12 RX ADMIN — OXYCODONE HYDROCHLORIDE AND ACETAMINOPHEN 1 TABLET: 5; 325 TABLET ORAL at 12:01

## 2023-01-12 RX ADMIN — DONEPEZIL HYDROCHLORIDE 10 MG: 5 TABLET, FILM COATED ORAL at 09:01

## 2023-01-12 RX ADMIN — QUETIAPINE FUMARATE 25 MG: 25 TABLET ORAL at 09:01

## 2023-01-12 RX ADMIN — METHOCARBAMOL TABLETS 750 MG: 750 TABLET, COATED ORAL at 09:01

## 2023-01-12 RX ADMIN — OXYCODONE HYDROCHLORIDE AND ACETAMINOPHEN 1 TABLET: 5; 325 TABLET ORAL at 05:01

## 2023-01-12 RX ADMIN — OXYCODONE HYDROCHLORIDE AND ACETAMINOPHEN 1 TABLET: 5; 325 TABLET ORAL at 06:01

## 2023-01-12 RX ADMIN — METHOCARBAMOL TABLETS 750 MG: 750 TABLET, COATED ORAL at 02:01

## 2023-01-12 RX ADMIN — ENOXAPARIN SODIUM 40 MG: 40 INJECTION SUBCUTANEOUS at 05:01

## 2023-01-12 RX ADMIN — MORPHINE SULFATE 4 MG: 4 INJECTION INTRAVENOUS at 12:01

## 2023-01-12 RX ADMIN — PANTOPRAZOLE SODIUM 40 MG: 40 TABLET, DELAYED RELEASE ORAL at 09:01

## 2023-01-12 RX ADMIN — Medication 6 MG: at 09:01

## 2023-01-12 NOTE — SUBJECTIVE & OBJECTIVE
Interval History: Continues to complain of constipation. Per daughter in law patient has been more alert today. Has presented with orthostatic hypotension when trying to stand up with PT.     Review of Systems   Constitutional:  Negative for chills, fatigue and fever.   HENT:  Negative for congestion, rhinorrhea and sore throat.    Respiratory:  Negative for cough, chest tightness and shortness of breath.    Cardiovascular:  Negative for chest pain, palpitations and leg swelling.   Gastrointestinal:  Positive for constipation. Negative for abdominal distention, abdominal pain, diarrhea, nausea and vomiting.   Genitourinary:  Negative for dysuria.   Musculoskeletal:  Negative for arthralgias, back pain and joint swelling.   Neurological:  Negative for dizziness and headaches.   Psychiatric/Behavioral:  Negative for agitation and confusion.    Objective:     Vital Signs (Most Recent):  Temp: 97.7 °F (36.5 °C) (01/12/23 0812)  Pulse: 78 (01/12/23 0812)  Resp: 18 (01/12/23 1219)  BP: (!) 147/82 (01/12/23 0812)  SpO2: (!) 94 % (01/12/23 0812)   Vital Signs (24h Range):  Temp:  [97.7 °F (36.5 °C)-98.1 °F (36.7 °C)] 97.7 °F (36.5 °C)  Pulse:  [78-85] 78  Resp:  [18-20] 18  SpO2:  [91 %-97 %] 94 %  BP: (122-147)/(76-87) 147/82     Weight: 74.8 kg (165 lb)  Body mass index is 28.32 kg/m².    Intake/Output Summary (Last 24 hours) at 1/12/2023 1657  Last data filed at 1/12/2023 0602  Gross per 24 hour   Intake --   Output 200 ml   Net -200 ml      Physical Exam  Constitutional:       General: She is not in acute distress.     Appearance: Normal appearance.   HENT:      Head: Normocephalic and atraumatic.   Eyes:      Extraocular Movements: Extraocular movements intact.      Pupils: Pupils are equal, round, and reactive to light.   Cardiovascular:      Rate and Rhythm: Normal rate and regular rhythm.      Pulses: Normal pulses.      Heart sounds: Normal heart sounds. No murmur heard.    No friction rub. No gallop.   Pulmonary:       Effort: Pulmonary effort is normal.      Breath sounds: Normal breath sounds. No wheezing, rhonchi or rales.   Abdominal:      General: Abdomen is flat. Bowel sounds are normal. There is no distension.      Palpations: Abdomen is soft.      Tenderness: There is no abdominal tenderness.   Musculoskeletal:         General: No swelling or tenderness. Normal range of motion.      Cervical back: Normal range of motion and neck supple. No tenderness.      Right lower leg: No edema.      Left lower leg: No edema.   Lymphadenopathy:      Cervical: No cervical adenopathy.   Skin:     Findings: No lesion or rash.   Neurological:      General: No focal deficit present.      Mental Status: She is alert and oriented to person, place, and time.      Cranial Nerves: No cranial nerve deficit.      Sensory: No sensory deficit.      Motor: No weakness.       Significant Labs: All pertinent labs within the past 24 hours have been reviewed.    Significant Imaging: I have reviewed all pertinent imaging results/findings within the past 24 hours.

## 2023-01-12 NOTE — ASSESSMENT & PLAN NOTE
Hb improving  Patient is asymptomatic  Will monitor daily  This is likely due to blood loss from surgery

## 2023-01-12 NOTE — PROGRESS NOTES
[facility] - 4th Floor [facility] Medicine  Progress Note    Patient Name: Gisele Meraz  MRN: [id number]  Patient Class: IP- Inpatient   Admission Date: 1/4/2023  Length of Stay: 8 days  Attending Physician: Cindy Smith MD  Primary Care Provider: Cindy Almaguer MD        Subjective:     Principal Problem:<principal problem not specified>        HPI:  Ms Jaquez is a 68 y/o female patient with past medical history significant for Alzheimer disease, fibromyalgia, rheumatoid arthritis who presented to ED with complaint of elevated blood pressure. Most of history is obtained from daughter in law who is primary caregiver. She states patient started feeling dizzy Tuesday night, they called office and labs were done which showed elevated BUN and creatinine, urine dipstick was obtained at home which was positive for leukocyte esterase. Wednesday morning blood pressure was checked at home and was 200/91, they called office and were instructed to go to ED. Patient denies history of hypertension and is not taking any medications. In ED blood pressure responded to amlodipine and IV hydralazine. Per daughter in law patient was going to be discharged however she was noted to sway towards the right side when walking. CT and MRI head were negative for ischemic stroke. Blood pressure remains elevated but improved. UA was obtained in ED which was negative.       Overview/Hospital Course:  No notes on file    Interval History: Continues to complain of constipation. Per daughter in law patient has been more alert today. Has presented with orthostatic hypotension when trying to stand up with PT.     Review of Systems   Constitutional:  Negative for chills, fatigue and fever.   HENT:  Negative for congestion, rhinorrhea and sore throat.    Respiratory:  Negative for cough, chest tightness and shortness of breath.    Cardiovascular:  Negative for chest pain, palpitations and leg swelling.    Gastrointestinal:  Positive for constipation. Negative for abdominal distention, abdominal pain, diarrhea, nausea and vomiting.   Genitourinary:  Negative for dysuria.   Musculoskeletal:  Negative for arthralgias, back pain and joint swelling.   Neurological:  Negative for dizziness and headaches.   Psychiatric/Behavioral:  Negative for agitation and confusion.    Objective:     Vital Signs (Most Recent):  Temp: 97.7 °F (36.5 °C) (01/12/23 0812)  Pulse: 78 (01/12/23 0812)  Resp: 18 (01/12/23 1219)  BP: (!) 147/82 (01/12/23 0812)  SpO2: (!) 94 % (01/12/23 0812)   Vital Signs (24h Range):  Temp:  [97.7 °F (36.5 °C)-98.1 °F (36.7 °C)] 97.7 °F (36.5 °C)  Pulse:  [78-85] 78  Resp:  [18-20] 18  SpO2:  [91 %-97 %] 94 %  BP: (122-147)/(76-87) 147/82     Weight: 74.8 kg (165 lb)  Body mass index is 28.32 kg/m².    Intake/Output Summary (Last 24 hours) at 1/12/2023 1657  Last data filed at 1/12/2023 0602  Gross per 24 hour   Intake --   Output 200 ml   Net -200 ml      Physical Exam  Constitutional:       General: She is not in acute distress.     Appearance: Normal appearance.   HENT:      Head: Normocephalic and atraumatic.   Eyes:      Extraocular Movements: Extraocular movements intact.      Pupils: Pupils are equal, round, and reactive to light.   Cardiovascular:      Rate and Rhythm: Normal rate and regular rhythm.      Pulses: Normal pulses.      Heart sounds: Normal heart sounds. No murmur heard.    No friction rub. No gallop.   Pulmonary:      Effort: Pulmonary effort is normal.      Breath sounds: Normal breath sounds. No wheezing, rhonchi or rales.   Abdominal:      General: Abdomen is flat. Bowel sounds are normal. There is no distension.      Palpations: Abdomen is soft.      Tenderness: There is no abdominal tenderness.   Musculoskeletal:         General: No swelling or tenderness. Normal range of motion.      Cervical back: Normal range of motion and neck supple. No tenderness.      Right lower leg: No edema.       Left lower leg: No edema.   Lymphadenopathy:      Cervical: No cervical adenopathy.   Skin:     Findings: No lesion or rash.   Neurological:      General: No focal deficit present.      Mental Status: She is alert and oriented to person, place, and time.      Cranial Nerves: No cranial nerve deficit.      Sensory: No sensory deficit.      Motor: No weakness.       Significant Labs: All pertinent labs within the past 24 hours have been reviewed.    Significant Imaging: I have reviewed all pertinent imaging results/findings within the past 24 hours.      Assessment/Plan:      Orthostatic hypotension  Could be due to volume depletion, patient's family does report patient is not drinking much fluids  Will start maintenance fluids with normal saline 75 cc/h       Constipation  Still not having BM, abdomen is soft non distended   Will obtain X ray of abdomen        Anemia  Hb improving  Patient is asymptomatic  Will monitor daily  This is likely due to blood loss from surgery       Closed left hip fracture  S/p repair per Ortho  Continue PT  Percocet every 6 h and morphine 2 mg Q 4 h PRN for pain control       Ataxic gait  Unclear etiology  CT and MRI head negative  Now that blood pressure is better controlled, will evaluate for any improvement   PT following, will likely need rehab   Pain is controlled on Percocet scheduled and morphine PRN        Hypertension  No PMH of HTN, not taking any medications  Blood pressure better controlled since starting amlodipine   Conitnue amlodipine 5 mg QD  Continue hydralazine PRN for BP > 180/100, has not been required       Rheumatoid arthritis  Continue home medications       Fibromyalgia  Continue home medications       Early onset Alzheimer's disease with behavioral disturbance  A little more confused from baseline, likely related to hospitalization and opioids  Confusion has improved some since decreasing frequency of Percocet  Continue home medications         VTE Risk  Mitigation (From admission, onward)         Ordered     enoxaparin injection 40 mg  Daily         01/07/23 1446     IP VTE HIGH RISK PATIENT  Once         01/04/23 2155     Place sequential compression device  Until discontinued         01/04/23 2155                Discharge Planning   DANIEL:      Code Status: DNR   Is the patient medically ready for discharge?:     Reason for patient still in hospital (select all that apply): Treatment  Discharge Plan A: Rehab                  Cindy Almaguer MD  Department of Hospital Medicine   [facility] - 4th Floor Medical Telemetry

## 2023-01-12 NOTE — ASSESSMENT & PLAN NOTE
Could be due to volume depletion, patient's family does report patient is not drinking much fluids  Will start maintenance fluids with normal saline 75 cc/h

## 2023-01-12 NOTE — PT/OT/SLP PROGRESS
Physical Therapy Treatment    Patient Name:  Gisele Meraz   MRN:  [id number]    Recommendations:     Discharge Recommendations: nursing facility, skilled  Discharge Equipment Recommendations: walker, rolling  Barriers to discharge: None    Assessment:     Gisele Meraz is a 67 y.o. female admitted with a medical diagnosis of <principal problem not specified>.  She presents with the following impairments/functional limitations: weakness, gait instability, impaired endurance, impaired balance, decreased safety awareness, impaired functional mobility .    Rehab Prognosis: Good; patient would benefit from acute skilled PT services to address these deficits and reach maximum level of function.    Recent Surgery: Procedure(s) (LRB):  HEMIARTHROPLASTY, HIP (Left) 5 Days Post-Op    Plan:     During this hospitalization, patient to be seen 6 x/week to address the identified rehab impairments via therapeutic activities and progress toward the following goals:    Plan of Care Expires:  02/07/23    Subjective     Chief Complaint:   Patient/Family Comments/goals:   Pain/Comfort:         Objective:     Communicated with nurse prior to session.  Patient found up in chair with telemetry, pulse ox (continuous), oxygen, peripheral IV upon PT entry to room.     General Precautions: Standard, fall  Orthopedic Precautions: LLE weight bearing as tolerated (no IR/ER)  Braces: N/A  Respiratory Status: Nasal cannula, flow 2 L/min     Functional Mobility:  Bed Mobility:     Sit to Supine: maximal assistance  Transfers:     Sit to Stand:  moderate assistance with rolling walker x4 trials; pt with noted orthostatic hypotension during standing (97/63) and associated dizziness  Bed to Chair: moderate assistance with  rolling walker  using  Step Transfer        Patient left with bed in chair position with all lines intact and call button in reach..    GOALS:   Multidisciplinary Problems       Physical Therapy Goals          Problem: Physical  Therapy    Goal Priority Disciplines Outcome Goal Variances Interventions   Physical Therapy Goal     PT, PT/OT Ongoing, Progressing     Description: Goals to be met by: 23     Patient will increase functional independence with mobility by performin. Supine to sit with Stand-by Assistance  2. Sit to supine with Stand-by Assistance  3. Sit to stand transfer Minimal Assistance and RW  4. Gait  x100 feet with Minimal Assistance and RW                          Time Tracking:     PT Received On: 23  PT Start Time: 1417     PT Stop Time: 1452  PT Total Time (min): 35 min     Billable Minutes: Therapeutic Activity 35    Treatment Type: Treatment  PT/PTA: PTA     PTA Visit Number: 4     2023

## 2023-01-12 NOTE — PT/OT/SLP PROGRESS
Occupational Therapy   Treatment    Name: Gisele Meraz  MRN: [id number]  Admitting Diagnosis: ataxic gait, HTN, RA, fibromyalgia, early onset Alzheimer's disease with behavioral disturbance  Recent Surgery: Procedure(s) (LRB):  HEMIARTHROPLASTY, HIP (Left) 5 Days Post-Op    Recommendations:     Discharge Recommendations: nursing facility, skilled   Discharge Equipment Recommendations: bedside commode, dressing devices, shower chair vs TTB (TBD, pending progress)  Barriers to discharge: placement    Assessment:     Gisele Meraz is a 67 y.o. female with a medical diagnosis of ataxic gait, HTN, RA, fibromyalgia, early onset Alzheimer's disease with behavioral disturbance. She presents with c/o pain in L LE/hip. Performance deficits affecting function are weakness, impaired endurance, impaired self care skills, impaired functional mobility, gait instability, impaired balance, impaired cognition, decreased lower extremity function, decreased safety awareness, pain, orthopedic precautions.     Rehab Prognosis: Fair; patient would benefit from acute skilled OT services to address these deficits and reach maximum level of function.       Plan:     Patient to be seen 6 x/week to address the above listed problems via self-care/home management, therapeutic activities, therapeutic exercises  Plan of Care Expires: 01/23/23  Plan of Care Reviewed with: patient (DIL)    Subjective     Pain/Comfort:  Pt c/o pain in L LE/hip, rating pain 8/10. RN was notified, and administered pt's pain medication via IV during session.    Objective:     Communicated with: RN prior to session. Patient found HOB elevated with telemetry, pulse ox (continuous), PureWick, oxygen, bed alarm, and abduction wedge pillow donned upon OT entry to room.    General Precautions: Standard, fall, L hip pxns (no internal or external rotation x6 weeks otherwise ROMAT with abduction pillow while in bed per MD)  Orthopedic Precautions: LLE WBAT  Braces: Hip abduction  pillow while in bed  Respiratory Status: O2 via NC  Vitals:  O2: 89-96%. RN was notified and present when pt's O2 desaturated to 89% while pt was resting in bed.  BP: 128/78  MS: 90     Occupational Performance:     Bed Mobility/Functional Mobility/Transfers:    Pt t/f from lying with HOB elevated to seated EOB with increased time, vcs, and max A provided. Note: Pt was yelling when OT initially attempted to t/f pt to EOB. Pt's RN was notified, and administered pt's pain medication via IV before pt attempted task again.  Pt performed sit to stand transfers from EOB and chair with mod A and vcs provided for proper hand placement, using RW.   Pt t/f from Bed > Chair using step transfer technique and RW with min A and vcs provided.    Activities of Daily Living:  Toileting: Pt required dep A to perform teri-care (to be sure pt was clean 2/2 soiled yunior pad) and to don diaper, using RW while standing.   LB dressing: Pt required dep A to doff/don B socks. Pt also required dep A to don diaper, using RW while standing.  Grooming: Pt washed face using bath cloth with setup/clean up A provided while lying with HOB elevated.     Education:  OT provided re-education on proper breathing technique during session to conserve pt's energy and regulate pt's breath.     Note: RN administered pt's pain medication and re-dressed pt's incision site during session.    Patient left UIC with all lines intact, call button in reach, chair alarm on, RN aware, pt's DIL present, and pillow positioned underneath pt's LLE to increase pt's comfort and positioning.    GOALS:   Multidisciplinary Problems       Occupational Therapy Goals          Problem: Occupational Therapy    Goal Priority Disciplines Outcome Interventions   Occupational Therapy Goal     OT, PT/OT Ongoing, Progressing    Description: Goals to be met by: 1/23/23     Patient will increase functional independence with ADLs by performing:    UE Dressing with Stand-by Assistance.  VICKY  Dressing with Minimal Assistance and Assistive Devices as needed.  Toileting from bedside commode with Minimal Assistance for hygiene and clothing management.                          Time Tracking:     OT Date of Treatment: 1/12/23  OT Start Time: 1205  OT Stop Time: 1256  OT Total Time (min): 51 mins    Billable Minutes: Self Care/Home Management 51 mins    OT/VICTORIA: OT     VICTORIA Visit Number: 3    1/12/2023

## 2023-01-13 LAB
BASOPHILS # BLD AUTO: 0.02 X10(3)/MCL (ref 0–0.2)
BASOPHILS NFR BLD AUTO: 0.3 %
EOSINOPHIL # BLD AUTO: 0.13 X10(3)/MCL (ref 0–0.9)
EOSINOPHIL NFR BLD AUTO: 1.8 %
ERYTHROCYTE [DISTWIDTH] IN BLOOD BY AUTOMATED COUNT: 12.8 % (ref 11.5–17)
HCT VFR BLD AUTO: 24.1 % (ref 37–47)
HGB BLD-MCNC: 7.3 GM/DL (ref 12–16)
IMM GRANULOCYTES # BLD AUTO: 0.05 X10(3)/MCL (ref 0–0.04)
IMM GRANULOCYTES NFR BLD AUTO: 0.7 %
LYMPHOCYTES # BLD AUTO: 0.73 X10(3)/MCL (ref 0.6–4.6)
LYMPHOCYTES NFR BLD AUTO: 10.4 %
MCH RBC QN AUTO: 29.2 PG
MCHC RBC AUTO-ENTMCNC: 30.3 MG/DL (ref 33–36)
MCV RBC AUTO: 96.4 FL (ref 80–94)
MONOCYTES # BLD AUTO: 0.59 X10(3)/MCL (ref 0.1–1.3)
MONOCYTES NFR BLD AUTO: 8.4 %
NEUTROPHILS # BLD AUTO: 5.52 X10(3)/MCL (ref 2.1–9.2)
NEUTROPHILS NFR BLD AUTO: 78.4 %
NRBC BLD AUTO-RTO: 0 %
PLATELET # BLD AUTO: 270 X10(3)/MCL (ref 130–400)
PMV BLD AUTO: 10.6 FL (ref 7.4–10.4)
POCT GLUCOSE: 109 MG/DL (ref 70–110)
RBC # BLD AUTO: 2.5 X10(6)/MCL (ref 4.2–5.4)
WBC # SPEC AUTO: 7 X10(3)/MCL (ref 4.5–11.5)

## 2023-01-13 PROCEDURE — 99233 PR SUBSEQUENT HOSPITAL CARE,LEVL III: ICD-10-PCS | Mod: ,,, | Performed by: STUDENT IN AN ORGANIZED HEALTH CARE EDUCATION/TRAINING PROGRAM

## 2023-01-13 PROCEDURE — 63600175 PHARM REV CODE 636 W HCPCS: Performed by: STUDENT IN AN ORGANIZED HEALTH CARE EDUCATION/TRAINING PROGRAM

## 2023-01-13 PROCEDURE — 97530 THERAPEUTIC ACTIVITIES: CPT

## 2023-01-13 PROCEDURE — 99233 SBSQ HOSP IP/OBS HIGH 50: CPT | Mod: ,,, | Performed by: STUDENT IN AN ORGANIZED HEALTH CARE EDUCATION/TRAINING PROGRAM

## 2023-01-13 PROCEDURE — 21400001 HC TELEMETRY ROOM

## 2023-01-13 PROCEDURE — 97164 PT RE-EVAL EST PLAN CARE: CPT

## 2023-01-13 PROCEDURE — 25000003 PHARM REV CODE 250: Performed by: STUDENT IN AN ORGANIZED HEALTH CARE EDUCATION/TRAINING PROGRAM

## 2023-01-13 PROCEDURE — 25000003 PHARM REV CODE 250: Performed by: PHYSICIAN ASSISTANT

## 2023-01-13 PROCEDURE — 97535 SELF CARE MNGMENT TRAINING: CPT

## 2023-01-13 PROCEDURE — 25000003 PHARM REV CODE 250: Performed by: INTERNAL MEDICINE

## 2023-01-13 PROCEDURE — 85025 COMPLETE CBC W/AUTO DIFF WBC: CPT | Performed by: STUDENT IN AN ORGANIZED HEALTH CARE EDUCATION/TRAINING PROGRAM

## 2023-01-13 PROCEDURE — 27000221 HC OXYGEN, UP TO 24 HOURS

## 2023-01-13 PROCEDURE — 36415 COLL VENOUS BLD VENIPUNCTURE: CPT | Performed by: STUDENT IN AN ORGANIZED HEALTH CARE EDUCATION/TRAINING PROGRAM

## 2023-01-13 PROCEDURE — 63600175 PHARM REV CODE 636 W HCPCS: Performed by: PHYSICIAN ASSISTANT

## 2023-01-13 RX ORDER — BISACODYL 10 MG
10 SUPPOSITORY, RECTAL RECTAL DAILY PRN
Status: DISCONTINUED | OUTPATIENT
Start: 2023-01-13 | End: 2023-01-18 | Stop reason: HOSPADM

## 2023-01-13 RX ADMIN — QUETIAPINE FUMARATE 25 MG: 25 TABLET ORAL at 09:01

## 2023-01-13 RX ADMIN — Medication 6 MG: at 08:01

## 2023-01-13 RX ADMIN — DULOXETINE 60 MG: 30 CAPSULE, DELAYED RELEASE ORAL at 09:01

## 2023-01-13 RX ADMIN — ENOXAPARIN SODIUM 40 MG: 40 INJECTION SUBCUTANEOUS at 05:01

## 2023-01-13 RX ADMIN — OXYCODONE HYDROCHLORIDE AND ACETAMINOPHEN 1 TABLET: 5; 325 TABLET ORAL at 06:01

## 2023-01-13 RX ADMIN — METHOCARBAMOL TABLETS 750 MG: 750 TABLET, COATED ORAL at 09:01

## 2023-01-13 RX ADMIN — OXYCODONE AND ACETAMINOPHEN 1 TABLET: 10; 325 TABLET ORAL at 08:01

## 2023-01-13 RX ADMIN — MEMANTINE 10 MG: 5 TABLET ORAL at 08:01

## 2023-01-13 RX ADMIN — DONEPEZIL HYDROCHLORIDE 10 MG: 5 TABLET, FILM COATED ORAL at 09:01

## 2023-01-13 RX ADMIN — DOCUSATE SODIUM 50 MG: 50 CAPSULE, LIQUID FILLED ORAL at 09:01

## 2023-01-13 RX ADMIN — DULOXETINE 60 MG: 30 CAPSULE, DELAYED RELEASE ORAL at 08:01

## 2023-01-13 RX ADMIN — METHOCARBAMOL TABLETS 750 MG: 750 TABLET, COATED ORAL at 03:01

## 2023-01-13 RX ADMIN — SODIUM CHLORIDE: 9 INJECTION, SOLUTION INTRAVENOUS at 09:01

## 2023-01-13 RX ADMIN — POLYETHYLENE GLYCOL 3350 17 G: 17 POWDER, FOR SOLUTION ORAL at 09:01

## 2023-01-13 RX ADMIN — PREDNISONE 5 MG: 5 TABLET ORAL at 09:01

## 2023-01-13 RX ADMIN — QUETIAPINE FUMARATE 25 MG: 25 TABLET ORAL at 08:01

## 2023-01-13 RX ADMIN — OXYCODONE HYDROCHLORIDE AND ACETAMINOPHEN 1 TABLET: 5; 325 TABLET ORAL at 05:01

## 2023-01-13 RX ADMIN — MEMANTINE 10 MG: 5 TABLET ORAL at 09:01

## 2023-01-13 RX ADMIN — METHOCARBAMOL TABLETS 750 MG: 750 TABLET, COATED ORAL at 08:01

## 2023-01-13 RX ADMIN — AMLODIPINE BESYLATE 2.5 MG: 2.5 TABLET ORAL at 09:01

## 2023-01-13 RX ADMIN — OXYCODONE HYDROCHLORIDE AND ACETAMINOPHEN 1 TABLET: 5; 325 TABLET ORAL at 11:01

## 2023-01-13 RX ADMIN — PANTOPRAZOLE SODIUM 40 MG: 40 TABLET, DELAYED RELEASE ORAL at 09:01

## 2023-01-13 NOTE — PROGRESS NOTES
[facility] - 4th Floor [facility] Medicine  Progress Note    Patient Name: Gisele Meraz  MRN: [id number]  Patient Class: IP- Inpatient   Admission Date: 1/4/2023  Length of Stay: 9 days  Attending Physician: Cnidy Smith MD  Primary Care Provider: Cindy Almaguer MD        Subjective:     Principal Problem:<principal problem not specified>        HPI:  Ms Jaquez is a 68 y/o female patient with past medical history significant for Alzheimer disease, fibromyalgia, rheumatoid arthritis who presented to ED with complaint of elevated blood pressure. Most of history is obtained from daughter in law who is primary caregiver. She states patient started feeling dizzy Tuesday night, they called office and labs were done which showed elevated BUN and creatinine, urine dipstick was obtained at home which was positive for leukocyte esterase. Wednesday morning blood pressure was checked at home and was 200/91, they called office and were instructed to go to ED. Patient denies history of hypertension and is not taking any medications. In ED blood pressure responded to amlodipine and IV hydralazine. Per daughter in law patient was going to be discharged however she was noted to sway towards the right side when walking. CT and MRI head were negative for ischemic stroke. Blood pressure remains elevated but improved. UA was obtained in ED which was negative.       Overview/Hospital Course:  No notes on file    Interval History: Pain is much better controlled today, patient was able to work with PT without difficulty, orthostatic hypotension has improved since starting IV fluids. Patient has had bowel movement. No new complaints today.     Review of Systems   Constitutional:  Negative for chills, fatigue and fever.   HENT:  Negative for congestion, rhinorrhea and sore throat.    Respiratory:  Negative for cough, chest tightness and shortness of breath.    Cardiovascular:  Negative for  chest pain, palpitations and leg swelling.   Gastrointestinal:  Negative for abdominal distention, abdominal pain, constipation, diarrhea, nausea and vomiting.   Genitourinary:  Negative for dysuria.   Musculoskeletal:  Negative for arthralgias, back pain and joint swelling.   Neurological:  Negative for dizziness and headaches.   Psychiatric/Behavioral:  Negative for agitation and confusion.    Objective:     Vital Signs (Most Recent):  Temp: 98 °F (36.7 °C) (01/13/23 1137)  Pulse: 84 (01/13/23 1137)  Resp: 18 (01/13/23 1137)  BP: (!) 147/75 (01/13/23 1137)  SpO2: (!) 93 % (01/13/23 1137)   Vital Signs (24h Range):  Temp:  [97.9 °F (36.6 °C)-98.6 °F (37 °C)] 98 °F (36.7 °C)  Pulse:  [84-94] 84  Resp:  [18-20] 18  SpO2:  [93 %-98 %] 93 %  BP: (131-170)/(71-82) 147/75     Weight: 74.8 kg (165 lb)  Body mass index is 28.32 kg/m².    Intake/Output Summary (Last 24 hours) at 1/13/2023 1247  Last data filed at 1/13/2023 0524  Gross per 24 hour   Intake 240 ml   Output 600 ml   Net -360 ml      Physical Exam  Constitutional:       General: She is not in acute distress.     Appearance: Normal appearance.   HENT:      Head: Normocephalic and atraumatic.   Eyes:      Extraocular Movements: Extraocular movements intact.      Pupils: Pupils are equal, round, and reactive to light.   Cardiovascular:      Rate and Rhythm: Normal rate and regular rhythm.      Pulses: Normal pulses.      Heart sounds: Normal heart sounds. No murmur heard.    No friction rub. No gallop.   Pulmonary:      Effort: Pulmonary effort is normal.      Breath sounds: Normal breath sounds. No wheezing, rhonchi or rales.   Abdominal:      General: Abdomen is flat. Bowel sounds are normal. There is no distension.      Palpations: Abdomen is soft.      Tenderness: There is no abdominal tenderness.   Musculoskeletal:         General: No swelling or tenderness. Normal range of motion.      Cervical back: Normal range of motion and neck supple. No tenderness.       Right lower leg: No edema.      Left lower leg: No edema.   Lymphadenopathy:      Cervical: No cervical adenopathy.   Skin:     Findings: No lesion or rash.   Neurological:      General: No focal deficit present.      Mental Status: She is alert and oriented to person, place, and time.      Cranial Nerves: No cranial nerve deficit.      Sensory: No sensory deficit.      Motor: No weakness.   Psychiatric:         Mood and Affect: Mood normal.         Behavior: Behavior normal.         Thought Content: Thought content normal.       Significant Labs: All pertinent labs within the past 24 hours have been reviewed.    Significant Imaging: I have reviewed all pertinent imaging results/findings within the past 24 hours.      Assessment/Plan:      Orthostatic hypotension  Improved since starting maintenance fluids  Continue NS at 75 cc/h      Constipation  X ray abdomen with no signs of bowel obstruction  Patient is having bowel movements now         Anemia  Hb improving  Patient is asymptomatic  CBC pending today   This is likely due to blood loss from surgery       Closed left hip fracture  S/p repair per Ortho  Continue PT  Adequate pain control with Percocet every 6 h and morphine 2 mg Q 4 h PRN  CM consulted, working on placement to SNF     Ataxic gait  Unclear etiology  CT and MRI head negative  PT following, family has decided on SNF for placement, CM consulted and following           Hypertension  No PMH of HTN, not taking any medications  Blood pressure better controlled since starting amlodipine   Conitnue amlodipine 5 mg QD  Continue hydralazine PRN for BP > 180/100, has not been required       Rheumatoid arthritis  Continue home medications       Fibromyalgia  Continue home medications       Early onset Alzheimer's disease with behavioral disturbance  A little more confused from baseline, likely related to hospitalization and opioids  Confusion has improved some since decreasing frequency of Percocet  Continue  home medications         VTE Risk Mitigation (From admission, onward)         Ordered     enoxaparin injection 40 mg  Daily         01/07/23 1446     IP VTE HIGH RISK PATIENT  Once         01/04/23 2155     Place sequential compression device  Until discontinued         01/04/23 2155                Discharge Planning   DANIEL:      Code Status: DNR   Is the patient medically ready for discharge?:     Reason for patient still in hospital (select all that apply): Treatment  Discharge Plan A: Rehab                  Cindy Almaguer MD  Department of Hospital Medicine   [facility] - 4th Floor Medical Telemetry

## 2023-01-13 NOTE — ASSESSMENT & PLAN NOTE
Unclear etiology  CT and MRI head negative  PT following, family has decided on SNF for placement, CM consulted and following

## 2023-01-13 NOTE — PLAN OF CARE
Spoke to patient and daughter in law at bedside about skilled nursing facility (Snf). Freedom of choice obtained. Referral sent to Galina at the [facility]. PASRR completed. Message sent to [facility] [place].

## 2023-01-13 NOTE — PT/OT/SLP PROGRESS
Occupational Therapy   Treatment    Name: Gisele Meraz  MRN: [id number]  Admitting Diagnosis: ataxic gait, HTN, RA, fibromyalgia, early onset Alzheimer's disease with behavioral disturbance  Recent Surgery: Procedure(s) (LRB):  HEMIARTHROPLASTY, HIP (Left) 6 Days Post-Op    Recommendations:     Discharge Recommendations: nursing facility, skilled   Discharge Equipment Recommendations: bedside commode, dressing devices, shower chair vs TTB (TBD, pending progress)  Barriers to discharge: placement    Assessment:     Gisele Meraz is a 67 y.o. female with a medical diagnosis of ataxic gait, HTN, RA, fibromyalgia, early onset Alzheimer's disease with behavioral disturbance. She presents with c/o pain in L LE/hip. Performance deficits affecting function are weakness, impaired endurance, impaired self care skills, impaired functional mobility, gait instability, impaired balance, impaired cognition, decreased lower extremity function, decreased safety awareness, pain, orthopedic precautions.     Rehab Prognosis: Fair; patient would benefit from acute skilled OT services to address these deficits and reach maximum level of function.       Plan:     Patient to be seen 6 x/week to address the above listed problems via self-care/home management, therapeutic activities, therapeutic exercises  Plan of Care Expires: 01/23/23  Plan of Care Reviewed with: patient (DIL)    Subjective     Pain/Comfort:  Pt c/o slight pain in L LE/hip at rest upon arrival, rating pain 1/10.     Objective:     Communicated with: RN prior to session. Patient found UIC with telemetry, pulse ox (continuous), peripheral IV, chair alarm, and NC doffed upon OT entry to room.    General Precautions: Standard, fall, L hip pxns (no internal or external rotation x6 weeks otherwise ROMAT with abduction pillow while in bed per MD)  Orthopedic Precautions: LLE WBAT  Braces: Hip abduction pillow while in bed  Respiratory Status: Pt's NC was doffed upon arrival. Pt's  O2 level decreased to 80s while seated in chair, so NC was re-donned with pt receiving 1.5L.  Vitals:  O2: 85-97%. RN notified of pt's O2 desaturation.  BP:    At rest: 110/61   Post-activity (after pt c/o dizziness): 134/73  WA: 88, 90     Occupational Performance:     Functional Mobility/Transfers:    Pt performed sit to stand transfers (x4 trials) from chair with mod A and vcs provided for proper hand placement, using RW.   Pt ambulated from chair at bedside to sink in room with CGA-min A and vcs provided to increase B foot clearance, using RW.     Activities of Daily Living:  Grooming: Pt applied toothpaste on toothbrush while seated in chair with verbal and visual cues provided. Pt then performed oral hygiene task while standing at sink with CGA and vcs provided.    Balance:  - Pt required min A to maintain static standing balance initially 2/2 posterior trunk lean. However, pt progressed to CGA.    Patient left UIC with all lines intact, call button in reach, RN notified, pt's DIL present, and pillow positioned underneath pt's LLE to increase pt's comfort and positioning.    GOALS:   Multidisciplinary Problems       Occupational Therapy Goals          Problem: Occupational Therapy    Goal Priority Disciplines Outcome Interventions   Occupational Therapy Goal     OT, PT/OT Ongoing, Progressing    Description: Goals to be met by: 1/23/23     Patient will increase functional independence with ADLs by performing:    UE Dressing with Stand-by Assistance.  LE Dressing with Minimal Assistance and Assistive Devices as needed.  Toileting from bedside commode with Minimal Assistance for hygiene and clothing management.                          Time Tracking:     OT Date of Treatment: 1/13/23  OT Start Time: 1349  OT Stop Time: 1425  OT Total Time (min): 36 mins    Billable Minutes: Self Care/Home Management 36 mins    OT/VICTORIA: OT     VICTORIA Visit Number: 4    1/13/2023

## 2023-01-13 NOTE — PT/OT/SLP RE-EVAL
Physical Therapy Re-evaluation    Patient Name:  Gisele Meraz   MRN:  [id number]    Recommendations:     Discharge Recommendations: nursing facility, skilled  Discharge Equipment Recommendations: walker, rolling   Barriers to discharge:  Lives at home alone with cameras with family living down street from residence     Assessment:     Gisele Meraz is a 67 y.o. female admitted with a medical diagnosis of L hip hemiarthroplasty.  She presents with the following impairments/functional limitations: weakness, impaired endurance, impaired self care skills, impaired functional mobility, gait instability, impaired balance, impaired cognition, decreased lower extremity function, decreased safety awareness, impaired cardiopulmonary response to activity. Pt treatment sessions have been limited by orthostatic hypotension the past two days. Pt with improvement in BP today remaining maye at 120/70 during activity, however she remains easily fatigued with exertion and requires frequent rest breaks. Pt remains appropriate for SNF following D/C from acute setting for ongoing therapy services.    Rehab Prognosis:  Fair; patient would benefit from acute skilled PT services to address these deficits and reach maximum level of function.      Recent Surgery: Procedure(s) (LRB):  HEMIARTHROPLASTY, HIP (Left) 6 Days Post-Op    Plan:     During this hospitalization, patient to be seen 6 x/week to address the above listed problems via gait training, therapeutic activities, therapeutic exercises  Plan of Care Expires:  02/07/23  Plan of Care Reviewed with: patient, daughter    Subjective     Communicated with RN prior to session.  Patient found HOB elevated with hip abduction pillow, oxygen, PureWick, peripheral IV, pulse ox (continuous), SCD, telemetry upon PT entry to room, agreeable to evaluation.      Chief Complaint: L hip pain, itching  Patient comments/goals: To go home  Pain/Comfort:  Location - Side 1: Left  Location 1: hip  Pain  Addressed 1: Reposition    Patients cultural, spiritual, Mandaeism conflicts given the current situation: no      Objective:     Patient found with: hip abduction pillow, oxygen, PureWick, peripheral IV, pulse ox (continuous), SCD, telemetry     General Precautions: Standard, fall  Orthopedic Precautions: LLE weight bearing as tolerated (no IR/ER)  Braces: N/A  Respiratory Status: Nasal cannula, flow 2 L/min    Exams:  Sensation:    -       Intact  RLE ROM: WNL  RLE Strength: WNL  LLE ROM: hip flexion and abd limited by discomfort, IR/ER not tested per precautions, knee and ankle WFL  LLE Strength: 3/5 quadriceps,     Functional Mobility:  Bed Mobility:     Rolling Right: Max assistance  Scooting: moderate assistance  Supine to Sit: max assistance  Transfers:     Sit to Stand:  moderate assistance with rolling walker  Balance: mod A with standing balance with use of walker, maintains L knee flexed requiring verbal cues to extend, noted increased trunk flexion and forward lean with knee fully extended.  Gait: 5 ft x 2 trials with max A. L knee flexion maintained during stance phase, assistance required for LLE advancement during second trial.        Treatment:  Pt resting in bed, Increased assistance provided for mobility to EOB to prevent increased pain and limitation of evaluation. Sat EOB with CGA, mattress deflated for improved posture and sitting balance. Sit<>stand from EOB with mod A using RW. During supported stand pt with urge for BM. Returned to seated EOB and used bedpan with + void. Supported standing x 4 min with RW, mod A. Cues to increase MANE, increase knee extension, and improve upright posture. Tech completed pericare. Transfer to bedside chair modA with RW. Extended rest break required due to fatigue before further gait training. O2 and BP monitored throughout session and remained within normal limits.          Patient left HOB elevated with call button in reach and RN notified.    GOALS:    Multidisciplinary Problems       Physical Therapy Goals          Problem: Physical Therapy    Goal Priority Disciplines Outcome Goal Variances Interventions   Physical Therapy Goal     PT, PT/OT Ongoing, Progressing     Description: Goals to be met by: 23     Patient will increase functional independence with mobility by performin. Supine to sit with Stand-by Assistance  2. Sit to supine with Stand-by Assistance  3. Sit to stand transfer Minimal Assistance and RW  4. Gait  x100 feet with Minimal Assistance and RW                          History:     Past Medical History:   Diagnosis Date    Acid reflux     Arthritis     Dementia     Patient on Memantine BID    Fibromyalgia     Pneumonia     RA (rheumatoid arthritis)        Past Surgical History:   Procedure Laterality Date    ADENOIDECTOMY       SECTION      HEMIARTHROPLASTY OF HIP Left 2023    Procedure: HEMIARTHROPLASTY, HIP;  Surgeon: Rodrick Andrew DO;  Location: [facility];  Service: Orthopedics;  Laterality: Left;    HYSTERECTOMY      TONSILLECTOMY         Time Tracking:     PT Received On:    PT Start Time: 911     PT Stop Time: 948  PT Total Time (min): 37 min     Billable Minutes: Re-eval 20  mins Therapeutic Activity 17 min      2023

## 2023-01-13 NOTE — SUBJECTIVE & OBJECTIVE
Interval History: Pain is much better controlled today, patient was able to work with PT without difficulty, orthostatic hypotension has improved since starting IV fluids. Patient has had bowel movement. No new complaints today.     Review of Systems   Constitutional:  Negative for chills, fatigue and fever.   HENT:  Negative for congestion, rhinorrhea and sore throat.    Respiratory:  Negative for cough, chest tightness and shortness of breath.    Cardiovascular:  Negative for chest pain, palpitations and leg swelling.   Gastrointestinal:  Negative for abdominal distention, abdominal pain, constipation, diarrhea, nausea and vomiting.   Genitourinary:  Negative for dysuria.   Musculoskeletal:  Negative for arthralgias, back pain and joint swelling.   Neurological:  Negative for dizziness and headaches.   Psychiatric/Behavioral:  Negative for agitation and confusion.    Objective:     Vital Signs (Most Recent):  Temp: 98 °F (36.7 °C) (01/13/23 1137)  Pulse: 84 (01/13/23 1137)  Resp: 18 (01/13/23 1137)  BP: (!) 147/75 (01/13/23 1137)  SpO2: (!) 93 % (01/13/23 1137)   Vital Signs (24h Range):  Temp:  [97.9 °F (36.6 °C)-98.6 °F (37 °C)] 98 °F (36.7 °C)  Pulse:  [84-94] 84  Resp:  [18-20] 18  SpO2:  [93 %-98 %] 93 %  BP: (131-170)/(71-82) 147/75     Weight: 74.8 kg (165 lb)  Body mass index is 28.32 kg/m².    Intake/Output Summary (Last 24 hours) at 1/13/2023 1247  Last data filed at 1/13/2023 0524  Gross per 24 hour   Intake 240 ml   Output 600 ml   Net -360 ml      Physical Exam  Constitutional:       General: She is not in acute distress.     Appearance: Normal appearance.   HENT:      Head: Normocephalic and atraumatic.   Eyes:      Extraocular Movements: Extraocular movements intact.      Pupils: Pupils are equal, round, and reactive to light.   Cardiovascular:      Rate and Rhythm: Normal rate and regular rhythm.      Pulses: Normal pulses.      Heart sounds: Normal heart sounds. No murmur heard.    No friction  rub. No gallop.   Pulmonary:      Effort: Pulmonary effort is normal.      Breath sounds: Normal breath sounds. No wheezing, rhonchi or rales.   Abdominal:      General: Abdomen is flat. Bowel sounds are normal. There is no distension.      Palpations: Abdomen is soft.      Tenderness: There is no abdominal tenderness.   Musculoskeletal:         General: No swelling or tenderness. Normal range of motion.      Cervical back: Normal range of motion and neck supple. No tenderness.      Right lower leg: No edema.      Left lower leg: No edema.   Lymphadenopathy:      Cervical: No cervical adenopathy.   Skin:     Findings: No lesion or rash.   Neurological:      General: No focal deficit present.      Mental Status: She is alert and oriented to person, place, and time.      Cranial Nerves: No cranial nerve deficit.      Sensory: No sensory deficit.      Motor: No weakness.   Psychiatric:         Mood and Affect: Mood normal.         Behavior: Behavior normal.         Thought Content: Thought content normal.       Significant Labs: All pertinent labs within the past 24 hours have been reviewed.    Significant Imaging: I have reviewed all pertinent imaging results/findings within the past 24 hours.

## 2023-01-13 NOTE — ASSESSMENT & PLAN NOTE
S/p repair per Ortho  Continue PT  Adequate pain control with Percocet every 6 h and morphine 2 mg Q 4 h PRN  CM consulted, working on placement to SNF

## 2023-01-13 NOTE — ASSESSMENT & PLAN NOTE
Hb improving  Patient is asymptomatic  CBC pending today   This is likely due to blood loss from surgery

## 2023-01-14 PROCEDURE — 63600175 PHARM REV CODE 636 W HCPCS: Performed by: STUDENT IN AN ORGANIZED HEALTH CARE EDUCATION/TRAINING PROGRAM

## 2023-01-14 PROCEDURE — 25000003 PHARM REV CODE 250: Performed by: STUDENT IN AN ORGANIZED HEALTH CARE EDUCATION/TRAINING PROGRAM

## 2023-01-14 PROCEDURE — 99233 SBSQ HOSP IP/OBS HIGH 50: CPT | Mod: ,,, | Performed by: INTERNAL MEDICINE

## 2023-01-14 PROCEDURE — 63600175 PHARM REV CODE 636 W HCPCS: Performed by: PHYSICIAN ASSISTANT

## 2023-01-14 PROCEDURE — 25000003 PHARM REV CODE 250: Performed by: INTERNAL MEDICINE

## 2023-01-14 PROCEDURE — 27000221 HC OXYGEN, UP TO 24 HOURS

## 2023-01-14 PROCEDURE — 99233 PR SUBSEQUENT HOSPITAL CARE,LEVL III: ICD-10-PCS | Mod: ,,, | Performed by: INTERNAL MEDICINE

## 2023-01-14 PROCEDURE — 21400001 HC TELEMETRY ROOM

## 2023-01-14 PROCEDURE — 97530 THERAPEUTIC ACTIVITIES: CPT | Mod: CQ

## 2023-01-14 PROCEDURE — 25000003 PHARM REV CODE 250: Performed by: PHYSICIAN ASSISTANT

## 2023-01-14 RX ORDER — CLONIDINE HYDROCHLORIDE 0.1 MG/1
0.1 TABLET ORAL NIGHTLY PRN
Status: DISCONTINUED | OUTPATIENT
Start: 2023-01-14 | End: 2023-01-18 | Stop reason: HOSPADM

## 2023-01-14 RX ADMIN — PANTOPRAZOLE SODIUM 40 MG: 40 TABLET, DELAYED RELEASE ORAL at 09:01

## 2023-01-14 RX ADMIN — AMLODIPINE BESYLATE 2.5 MG: 2.5 TABLET ORAL at 09:01

## 2023-01-14 RX ADMIN — QUETIAPINE FUMARATE 25 MG: 25 TABLET ORAL at 09:01

## 2023-01-14 RX ADMIN — OXYCODONE HYDROCHLORIDE AND ACETAMINOPHEN 1 TABLET: 5; 325 TABLET ORAL at 11:01

## 2023-01-14 RX ADMIN — DULOXETINE 60 MG: 30 CAPSULE, DELAYED RELEASE ORAL at 09:01

## 2023-01-14 RX ADMIN — OXYCODONE HYDROCHLORIDE AND ACETAMINOPHEN 1 TABLET: 5; 325 TABLET ORAL at 05:01

## 2023-01-14 RX ADMIN — POLYETHYLENE GLYCOL 3350 17 G: 17 POWDER, FOR SOLUTION ORAL at 09:01

## 2023-01-14 RX ADMIN — ENOXAPARIN SODIUM 40 MG: 40 INJECTION SUBCUTANEOUS at 04:01

## 2023-01-14 RX ADMIN — METHOCARBAMOL TABLETS 750 MG: 750 TABLET, COATED ORAL at 09:01

## 2023-01-14 RX ADMIN — METHOCARBAMOL TABLETS 750 MG: 750 TABLET, COATED ORAL at 02:01

## 2023-01-14 RX ADMIN — MEMANTINE 10 MG: 5 TABLET ORAL at 09:01

## 2023-01-14 RX ADMIN — PREDNISONE 5 MG: 5 TABLET ORAL at 09:01

## 2023-01-14 RX ADMIN — DOCUSATE SODIUM 50 MG: 50 CAPSULE, LIQUID FILLED ORAL at 09:01

## 2023-01-14 RX ADMIN — OXYCODONE AND ACETAMINOPHEN 1 TABLET: 10; 325 TABLET ORAL at 09:01

## 2023-01-14 RX ADMIN — DONEPEZIL HYDROCHLORIDE 10 MG: 5 TABLET, FILM COATED ORAL at 09:01

## 2023-01-14 RX ADMIN — Medication 6 MG: at 09:01

## 2023-01-14 RX ADMIN — SENNOSIDES 8.6 MG: 8.6 TABLET, FILM COATED ORAL at 09:01

## 2023-01-14 NOTE — ASSESSMENT & PLAN NOTE
Blood pressures are somewhat labile.  Will hold amlodipine for the time being and just use clonidine at night  Hopefully this will allow her blood pressures to recover in the morning  However she is on Seroquel twice a day as this can contribute to hypotension.  However the family refuses to stop this as this is only thing that is controlling her behavioral issues with her dementia.

## 2023-01-14 NOTE — PROGRESS NOTES
[facility] - 4th Floor [facility] Medicine  Progress Note    Patient Name: Gisele Meraz  MRN: [id number]  Patient Class: IP- Inpatient   Admission Date: 1/4/2023  Length of Stay: 10 days  Attending Physician: Cindy Smith MD  Primary Care Provider: Cindy Almaguer MD        Subjective:     Principal Problem:<principal problem not specified>        HPI:  Ms Jaquez is a 68 y/o female patient with past medical history significant for Alzheimer disease, fibromyalgia, rheumatoid arthritis who presented to ED with complaint of elevated blood pressure. Most of history is obtained from daughter in law who is primary caregiver. She states patient started feeling dizzy Tuesday night, they called office and labs were done which showed elevated BUN and creatinine, urine dipstick was obtained at home which was positive for leukocyte esterase. Wednesday morning blood pressure was checked at home and was 200/91, they called office and were instructed to go to ED. Patient denies history of hypertension and is not taking any medications. In ED blood pressure responded to amlodipine and IV hydralazine. Per daughter in law patient was going to be discharged however she was noted to sway towards the right side when walking. CT and MRI head were negative for ischemic stroke. Blood pressure remains elevated but improved. UA was obtained in ED which was negative.       Overview/Hospital Course:  No notes on file    Interval History:  Pain is okay she states not in much pain however she is dealing with a history of dementia for which she is on Seroquel twice a day.  Daughter does report fluctuations in her blood pressure.  Blood pressures are low in the morning but elevated in the evening time.  Did place her on amlodipine 2.5 mg.  Physical therapy has not work with the much because of drops in her blood pressure in the morning.    She is status post hip replacement doing well without   No  other complaints at this time  Review of Systems   Constitutional:  Negative for chills, fatigue and fever.   HENT:  Negative for congestion, rhinorrhea and sore throat.    Respiratory:  Negative for cough, chest tightness and shortness of breath.    Cardiovascular:  Negative for chest pain, palpitations and leg swelling.   Gastrointestinal:  Negative for abdominal distention, abdominal pain, constipation, diarrhea, nausea and vomiting.   Genitourinary:  Negative for dysuria.   Musculoskeletal:  Negative for arthralgias, back pain and joint swelling.   Neurological:  Negative for dizziness and headaches.   Psychiatric/Behavioral:  Negative for agitation and confusion.    Objective:     Vital Signs (Most Recent):  Temp: 98.4 °F (36.9 °C) (01/14/23 1500)  Pulse: 86 (01/14/23 1500)  Resp: 18 (01/14/23 1500)  BP: 130/74 (01/14/23 1500)  SpO2: (!) 93 % (01/14/23 1500)   Vital Signs (24h Range):  Temp:  [98 °F (36.7 °C)-98.7 °F (37.1 °C)] 98.4 °F (36.9 °C)  Pulse:  [84-96] 86  Resp:  [16-22] 18  SpO2:  [92 %-100 %] 93 %  BP: (122-175)/(68-81) 130/74     Weight: 74.8 kg (165 lb)  Body mass index is 28.32 kg/m².    Intake/Output Summary (Last 24 hours) at 1/14/2023 1726  Last data filed at 1/14/2023 1500  Gross per 24 hour   Intake 360 ml   Output 350 ml   Net 10 ml        Physical Exam  Constitutional:       General: She is not in acute distress.     Appearance: Normal appearance.   HENT:      Head: Normocephalic and atraumatic.   Eyes:      Extraocular Movements: Extraocular movements intact.      Pupils: Pupils are equal, round, and reactive to light.   Cardiovascular:      Rate and Rhythm: Normal rate and regular rhythm.      Pulses: Normal pulses.      Heart sounds: Normal heart sounds. No murmur heard.    No friction rub. No gallop.   Pulmonary:      Effort: Pulmonary effort is normal.      Breath sounds: Normal breath sounds. No wheezing, rhonchi or rales.   Abdominal:      General: Abdomen is flat. Bowel sounds are  normal. There is no distension.      Palpations: Abdomen is soft.      Tenderness: There is no abdominal tenderness.   Musculoskeletal:         General: No swelling or tenderness. Normal range of motion.      Cervical back: Normal range of motion and neck supple. No tenderness.      Right lower leg: No edema.      Left lower leg: No edema.   Lymphadenopathy:      Cervical: No cervical adenopathy.   Skin:     Findings: No lesion or rash.   Neurological:      General: No focal deficit present.      Mental Status: She is alert and oriented to person, place, and time.      Cranial Nerves: No cranial nerve deficit.      Sensory: No sensory deficit.      Motor: No weakness.   Psychiatric:         Mood and Affect: Mood normal.         Behavior: Behavior normal.         Thought Content: Thought content normal.       Significant Labs: All pertinent labs within the past 24 hours have been reviewed.    Significant Imaging: I have reviewed all pertinent imaging results/findings within the past 24 hours.      Assessment/Plan:      Orthostatic hypotension   see above      Constipation  X ray abdomen with no signs of bowel obstruction  Patient is having bowel movements now         Anemia  H&H stable   Patient is essentially asymptomatic but could be slow likely contributing to her low blood pressures in the morning.    Transfuse as needed    Closed left hip fracture  Status post ORIF   Caution with opiate pain meds as this could drop her blood pressure   Continue with physical therapy will need placement    Ataxic gait  Unclear etiology  CT and MRI head negative  PT following, family has decided on SNF for placement, CM consulted and following           Hypertension  Blood pressures are somewhat labile.  Will hold amlodipine for the time being and just use clonidine at night  Hopefully this will allow her blood pressures to recover in the morning  However she is on Seroquel twice a day as this can contribute to hypotension.   However the family refuses to stop this as this is only thing that is controlling her behavioral issues with her dementia.    Rheumatoid arthritis  Continue home medications       Fibromyalgia  Continue home medications       Early onset Alzheimer's disease with behavioral disturbance  A little more confused from baseline, likely related to hospitalization and opioids  Confusion has improved some since decreasing frequency of Percocet  Continue home medications         VTE Risk Mitigation (From admission, onward)         Ordered     enoxaparin injection 40 mg  Daily         01/07/23 1446     IP VTE HIGH RISK PATIENT  Once         01/04/23 2155     Place sequential compression device  Until discontinued         01/04/23 2155                Discharge Planning   DANIEL:      Code Status: DNR   Is the patient medically ready for discharge?:     Reason for patient still in hospital (select all that apply): Treatment  Discharge Plan A: Rehab                  SAMANTHA SOTO MD  Department of Hospital Medicine   [facility] - 4th Floor Medical Telemetry

## 2023-01-14 NOTE — ASSESSMENT & PLAN NOTE
Status post ORIF   Caution with opiate pain meds as this could drop her blood pressure   Continue with physical therapy will need placement

## 2023-01-14 NOTE — SUBJECTIVE & OBJECTIVE
Interval History:  Pain is okay she states not in much pain however she is dealing with a history of dementia for which she is on Seroquel twice a day.  Daughter does report fluctuations in her blood pressure.  Blood pressures are low in the morning but elevated in the evening time.  Did place her on amlodipine 2.5 mg.  Physical therapy has not work with the much because of drops in her blood pressure in the morning.    She is status post hip replacement doing well without   No other complaints at this time  Review of Systems   Constitutional:  Negative for chills, fatigue and fever.   HENT:  Negative for congestion, rhinorrhea and sore throat.    Respiratory:  Negative for cough, chest tightness and shortness of breath.    Cardiovascular:  Negative for chest pain, palpitations and leg swelling.   Gastrointestinal:  Negative for abdominal distention, abdominal pain, constipation, diarrhea, nausea and vomiting.   Genitourinary:  Negative for dysuria.   Musculoskeletal:  Negative for arthralgias, back pain and joint swelling.   Neurological:  Negative for dizziness and headaches.   Psychiatric/Behavioral:  Negative for agitation and confusion.    Objective:     Vital Signs (Most Recent):  Temp: 98.4 °F (36.9 °C) (01/14/23 1500)  Pulse: 86 (01/14/23 1500)  Resp: 18 (01/14/23 1500)  BP: 130/74 (01/14/23 1500)  SpO2: (!) 93 % (01/14/23 1500)   Vital Signs (24h Range):  Temp:  [98 °F (36.7 °C)-98.7 °F (37.1 °C)] 98.4 °F (36.9 °C)  Pulse:  [84-96] 86  Resp:  [16-22] 18  SpO2:  [92 %-100 %] 93 %  BP: (122-175)/(68-81) 130/74     Weight: 74.8 kg (165 lb)  Body mass index is 28.32 kg/m².    Intake/Output Summary (Last 24 hours) at 1/14/2023 1726  Last data filed at 1/14/2023 1500  Gross per 24 hour   Intake 360 ml   Output 350 ml   Net 10 ml        Physical Exam  Constitutional:       General: She is not in acute distress.     Appearance: Normal appearance.   HENT:      Head: Normocephalic and atraumatic.   Eyes:       Extraocular Movements: Extraocular movements intact.      Pupils: Pupils are equal, round, and reactive to light.   Cardiovascular:      Rate and Rhythm: Normal rate and regular rhythm.      Pulses: Normal pulses.      Heart sounds: Normal heart sounds. No murmur heard.    No friction rub. No gallop.   Pulmonary:      Effort: Pulmonary effort is normal.      Breath sounds: Normal breath sounds. No wheezing, rhonchi or rales.   Abdominal:      General: Abdomen is flat. Bowel sounds are normal. There is no distension.      Palpations: Abdomen is soft.      Tenderness: There is no abdominal tenderness.   Musculoskeletal:         General: No swelling or tenderness. Normal range of motion.      Cervical back: Normal range of motion and neck supple. No tenderness.      Right lower leg: No edema.      Left lower leg: No edema.   Lymphadenopathy:      Cervical: No cervical adenopathy.   Skin:     Findings: No lesion or rash.   Neurological:      General: No focal deficit present.      Mental Status: She is alert and oriented to person, place, and time.      Cranial Nerves: No cranial nerve deficit.      Sensory: No sensory deficit.      Motor: No weakness.   Psychiatric:         Mood and Affect: Mood normal.         Behavior: Behavior normal.         Thought Content: Thought content normal.       Significant Labs: All pertinent labs within the past 24 hours have been reviewed.    Significant Imaging: I have reviewed all pertinent imaging results/findings within the past 24 hours.

## 2023-01-14 NOTE — ASSESSMENT & PLAN NOTE
H&H stable   Patient is essentially asymptomatic but could be slow likely contributing to her low blood pressures in the morning.    Transfuse as needed

## 2023-01-14 NOTE — PT/OT/SLP PROGRESS
Physical Therapy Treatment    Patient Name:  Gisele Meraz   MRN:  [id number]    Recommendations:     Discharge Recommendations:  nursing facility, skilled   Discharge Equipment Recommendations: walker, rolling     Subjective     Patient awake.     Communicated with NSG prior to session to see if Pt is appropriate for therapy today. Pt greeted and agreed to session.    Objective:     General Precautions: Standard, fall   Orthopedic Precautions:LLE weight bearing as tolerated (no IR/ER)   Braces:    Respiratory Status: Nasal cannula, flow 2 L/min     Functional Mobility:    Bed Mobility: Max Assist  Sit to Stand: Max Assist  Transfers: Max Assist  Gait: Not successful  Equipment Used: Gait belt, Rolling walker    Assessment:     Gisele Meraz is a 67 y.o. female admitted with a medical diagnosis of <principal problem not specified>.     Treatment Encounter Note:  Patient actively participated with treatment today with no adverse effects. Patient performed supine to EOB mobility. Once EOB pt c/o dizziness. PT assessed BP while sitting EOB at 168/82 and O2 96%. Pt sat EOB while dizziness subsided. Pt performed sit to stand x5 and tolerated static standing x5 mins while trying to assess BP in standing. During each stand pt c/o dizziness and BP assessed at 102/64, but not sure if accurate because pt had to sit before BP machine was done taking BP. BP machine was taking longer than usual so another BP was assessed in standing again after the dizziness subsided while pt sat EOB. Next BP was 95/59, Pt once again c/o dizziness and returned to sitting EOB. Pt did this a total of 5 times and each time c/o dizziness and lightheadedness. Once dizziness subsided after the final stand, pt performed a stand step t/f from EOB to chair. During t/f PT had to rush t/f due to pt becoming dizzy and lightheaded with B knees beginning to buckle and was successfully t/f'd to chair. Nsg was notified of pt's orthostatic episodes. Increased time  due to pt's c/o dizziness and BP machine acting up. Patient left up in chair with all lines intact, call button in reach, and family present.    Rehab Prognosis: Good; patient would benefit from acute skilled PT services to address these deficits and reach maximum level of function.    Recent Surgery: Procedure(s) (LRB):  HEMIARTHROPLASTY, HIP (Left) 7 Days Post-Op  GOALS:   Multidisciplinary Problems       Physical Therapy Goals          Problem: Physical Therapy    Goal Priority Disciplines Outcome Goal Variances Interventions   Physical Therapy Goal     PT, PT/OT Ongoing, Progressing     Description: Goals to be met by: 23     Patient will increase functional independence with mobility by performin. Supine to sit with Stand-by Assistance  2. Sit to supine with Stand-by Assistance  3. Sit to stand transfer Minimal Assistance and RW  4. Gait  x100 feet with Minimal Assistance and RW                          Plan:     During this hospitalization, patient to be seen 6 x/week to address the identified rehab impairments via gait training, therapeutic activities, therapeutic exercises and progress toward the following goals:    Plan of Care Expires:  23    Billable Minutes     Billable Minutes: Therapeutic Activity 56    Treatment Type: Treatment  PT/PTA: PTA     PTA Visit Number: 2023

## 2023-01-15 PROCEDURE — 97110 THERAPEUTIC EXERCISES: CPT | Mod: CQ

## 2023-01-15 PROCEDURE — 97530 THERAPEUTIC ACTIVITIES: CPT | Mod: CO

## 2023-01-15 PROCEDURE — 27000221 HC OXYGEN, UP TO 24 HOURS

## 2023-01-15 PROCEDURE — 63600175 PHARM REV CODE 636 W HCPCS: Performed by: PHYSICIAN ASSISTANT

## 2023-01-15 PROCEDURE — 97530 THERAPEUTIC ACTIVITIES: CPT | Mod: CQ

## 2023-01-15 PROCEDURE — 94761 N-INVAS EAR/PLS OXIMETRY MLT: CPT

## 2023-01-15 PROCEDURE — 25000003 PHARM REV CODE 250: Performed by: PHYSICIAN ASSISTANT

## 2023-01-15 PROCEDURE — 63600175 PHARM REV CODE 636 W HCPCS: Performed by: STUDENT IN AN ORGANIZED HEALTH CARE EDUCATION/TRAINING PROGRAM

## 2023-01-15 PROCEDURE — 21400001 HC TELEMETRY ROOM

## 2023-01-15 PROCEDURE — 99232 PR SUBSEQUENT HOSPITAL CARE,LEVL II: ICD-10-PCS | Mod: ,,, | Performed by: INTERNAL MEDICINE

## 2023-01-15 PROCEDURE — 25000003 PHARM REV CODE 250: Performed by: STUDENT IN AN ORGANIZED HEALTH CARE EDUCATION/TRAINING PROGRAM

## 2023-01-15 PROCEDURE — 99232 SBSQ HOSP IP/OBS MODERATE 35: CPT | Mod: ,,, | Performed by: INTERNAL MEDICINE

## 2023-01-15 PROCEDURE — 97116 GAIT TRAINING THERAPY: CPT | Mod: CQ

## 2023-01-15 PROCEDURE — 25000003 PHARM REV CODE 250: Performed by: INTERNAL MEDICINE

## 2023-01-15 RX ADMIN — DONEPEZIL HYDROCHLORIDE 10 MG: 5 TABLET, FILM COATED ORAL at 09:01

## 2023-01-15 RX ADMIN — OXYCODONE HYDROCHLORIDE AND ACETAMINOPHEN 1 TABLET: 5; 325 TABLET ORAL at 06:01

## 2023-01-15 RX ADMIN — METHOCARBAMOL TABLETS 750 MG: 750 TABLET, COATED ORAL at 09:01

## 2023-01-15 RX ADMIN — QUETIAPINE FUMARATE 25 MG: 25 TABLET ORAL at 09:01

## 2023-01-15 RX ADMIN — ENOXAPARIN SODIUM 40 MG: 40 INJECTION SUBCUTANEOUS at 05:01

## 2023-01-15 RX ADMIN — PREDNISONE 5 MG: 5 TABLET ORAL at 09:01

## 2023-01-15 RX ADMIN — POLYETHYLENE GLYCOL 3350 17 G: 17 POWDER, FOR SOLUTION ORAL at 09:01

## 2023-01-15 RX ADMIN — OXYCODONE HYDROCHLORIDE AND ACETAMINOPHEN 1 TABLET: 5; 325 TABLET ORAL at 05:01

## 2023-01-15 RX ADMIN — Medication 6 MG: at 09:01

## 2023-01-15 RX ADMIN — MEMANTINE 10 MG: 5 TABLET ORAL at 09:01

## 2023-01-15 RX ADMIN — OXYCODONE AND ACETAMINOPHEN 1 TABLET: 10; 325 TABLET ORAL at 09:01

## 2023-01-15 RX ADMIN — DULOXETINE 60 MG: 30 CAPSULE, DELAYED RELEASE ORAL at 09:01

## 2023-01-15 RX ADMIN — METHOCARBAMOL TABLETS 750 MG: 750 TABLET, COATED ORAL at 02:01

## 2023-01-15 RX ADMIN — DOCUSATE SODIUM 50 MG: 50 CAPSULE, LIQUID FILLED ORAL at 09:01

## 2023-01-15 RX ADMIN — PANTOPRAZOLE SODIUM 40 MG: 40 TABLET, DELAYED RELEASE ORAL at 09:01

## 2023-01-15 RX ADMIN — OXYCODONE HYDROCHLORIDE AND ACETAMINOPHEN 1 TABLET: 5; 325 TABLET ORAL at 12:01

## 2023-01-15 NOTE — ASSESSMENT & PLAN NOTE
H&H stable   Patient is essentially asymptomatic but could be slow likely contributing to her low blood pressures in the morning.    Transfuse as needed  Repeat blood work in the morning.

## 2023-01-15 NOTE — PT/OT/SLP PROGRESS
Physical Therapy Treatment    Patient Name:  Gisele Meraz   MRN:  [id number]    Recommendations:     Discharge Recommendations:  nursing facility, skilled   Discharge Equipment Recommendations: walker, rolling     Subjective     Patient awake.     Communicated with NSG prior to session to see if Pt is appropriate for therapy today. Pt greeted and agreed to session.    Objective:     General Precautions: Standard, fall   Orthopedic Precautions:LLE weight bearing as tolerated (no IR/ER)   Braces:    Respiratory Status: Room air     Functional Mobility:    Bed Mobility: Mod Assist  Sit to Stand: Max Assist  Transfers: Max Assist  Gait: 15 Ft. Min Assist   Equipment Used: Gait belt, Rolling walker    Assessment:     Gisele Meraz is a 67 y.o. female admitted with a medical diagnosis of <principal problem not specified>.     Treatment Encounter Note:  Patient actively participated with treatment today with no adverse effects. Daughter in law stated that the changed her BP meds after yesterdays PT session so, BP assessed in lying prior to bed mobility and was 141/84. Patient performed supine to EOB mobility with mod assist no c/o dizziness and a seated BP of 144/85. BP taken in standing at 101/66 with minimal c/o dizziness. Pt performed sit to stands 2 sets of 10 for purposes of strengthening and required a seated rest break between sets.Pt ambulated 15 ft from EOB to bathroom sink to brush her hair and teeth. Pt had to sit when she got into the restroom due to fatigue and finished her ADLs in sitting. Patient left up in chair with all lines intact, call button in reach, and daughter in law present. Increased time due to pt not able to stay on task throughout the session.    Rehab Prognosis: Good; patient would benefit from acute skilled PT services to address these deficits and reach maximum level of function.    Recent Surgery: Procedure(s) (LRB):  HEMIARTHROPLASTY, HIP (Left) 8 Days Post-Op  GOALS:   Multidisciplinary  Problems       Physical Therapy Goals          Problem: Physical Therapy    Goal Priority Disciplines Outcome Goal Variances Interventions   Physical Therapy Goal     PT, PT/OT Ongoing, Progressing     Description: Goals to be met by: 23     Patient will increase functional independence with mobility by performin. Supine to sit with Stand-by Assistance  2. Sit to supine with Stand-by Assistance  3. Sit to stand transfer Minimal Assistance and RW  4. Gait  x100 feet with Minimal Assistance and RW                          Plan:     During this hospitalization, patient to be seen 6 x/week to address the identified rehab impairments via gait training, therapeutic activities, therapeutic exercises and progress toward the following goals:    Plan of Care Expires:  23    Billable Minutes     Billable Minutes: Gait Training 10, Therapeutic Activity 28, and Therapeutic Exercise 15    Treatment Type: Treatment  PT/PTA: PTA     PTA Visit Number: 2     01/15/2023

## 2023-01-15 NOTE — PROGRESS NOTES
[facility] - 4th Floor [facility] Medicine  Progress Note    Patient Name: Gisele Meraz  MRN: [id number]  Patient Class: IP- Inpatient   Admission Date: 1/4/2023  Length of Stay: 11 days  Attending Physician: Cindy Smith MD  Primary Care Provider: Cindy Almaguer MD        Subjective:     Principal Problem:<principal problem not specified>        HPI:  Ms Jaquez is a 66 y/o female patient with past medical history significant for Alzheimer disease, fibromyalgia, rheumatoid arthritis who presented to ED with complaint of elevated blood pressure. Most of history is obtained from daughter in law who is primary caregiver. She states patient started feeling dizzy Tuesday night, they called office and labs were done which showed elevated BUN and creatinine, urine dipstick was obtained at home which was positive for leukocyte esterase. Wednesday morning blood pressure was checked at home and was 200/91, they called office and were instructed to go to ED. Patient denies history of hypertension and is not taking any medications. In ED blood pressure responded to amlodipine and IV hydralazine. Per daughter in law patient was going to be discharged however she was noted to sway towards the right side when walking. CT and MRI head were negative for ischemic stroke. Blood pressure remains elevated but improved. UA was obtained in ED which was negative.       Overview/Hospital Course:  No notes on file    Interval History:  Blood pressures are better this morning after discontinuation of amlodipine and place him on clonidine just at night.  She is sitting up in the bed currently with complaints pain is controlled   Vital signs stable afebrile   Working with physical therapy   No complaints   This was discussed with family in the room.      Review of Systems   Constitutional:  Negative for chills, fatigue and fever.   HENT:  Negative for congestion, rhinorrhea and sore throat.     Respiratory:  Negative for cough, chest tightness and shortness of breath.    Cardiovascular:  Negative for chest pain, palpitations and leg swelling.   Gastrointestinal:  Negative for abdominal distention, abdominal pain, constipation, diarrhea, nausea and vomiting.   Genitourinary:  Negative for dysuria.   Musculoskeletal:  Negative for arthralgias, back pain and joint swelling.   Neurological:  Negative for dizziness and headaches.   Psychiatric/Behavioral:  Negative for agitation and confusion.    Objective:     Vital Signs (Most Recent):  Temp: 98.5 °F (36.9 °C) (01/15/23 1528)  Pulse: 82 (01/15/23 1528)  Resp: 18 (01/15/23 1528)  BP: 132/64 (01/15/23 1528)  SpO2: 96 % (01/15/23 1528)   Vital Signs (24h Range):  Temp:  [97.8 °F (36.6 °C)-98.5 °F (36.9 °C)] 98.5 °F (36.9 °C)  Pulse:  [] 82  Resp:  [16-20] 18  SpO2:  [89 %-99 %] 96 %  BP: ()/(58-90) 132/64     Weight: 74.8 kg (165 lb)  Body mass index is 28.32 kg/m².    Intake/Output Summary (Last 24 hours) at 1/15/2023 1635  Last data filed at 1/15/2023 1400  Gross per 24 hour   Intake 870 ml   Output 900 ml   Net -30 ml        Physical Exam  Constitutional:       General: She is not in acute distress.     Appearance: Normal appearance.   HENT:      Head: Normocephalic and atraumatic.   Eyes:      Extraocular Movements: Extraocular movements intact.      Pupils: Pupils are equal, round, and reactive to light.   Cardiovascular:      Rate and Rhythm: Normal rate and regular rhythm.      Pulses: Normal pulses.      Heart sounds: Normal heart sounds. No murmur heard.    No friction rub. No gallop.   Pulmonary:      Effort: Pulmonary effort is normal.      Breath sounds: Normal breath sounds. No wheezing, rhonchi or rales.   Abdominal:      General: Abdomen is flat. Bowel sounds are normal. There is no distension.      Palpations: Abdomen is soft.      Tenderness: There is no abdominal tenderness.   Musculoskeletal:         General: No swelling or  tenderness. Normal range of motion.      Cervical back: Normal range of motion and neck supple. No tenderness.      Right lower leg: No edema.      Left lower leg: No edema.   Lymphadenopathy:      Cervical: No cervical adenopathy.   Skin:     Findings: No lesion or rash.   Neurological:      General: No focal deficit present.      Mental Status: She is alert and oriented to person, place, and time.      Cranial Nerves: No cranial nerve deficit.      Sensory: No sensory deficit.      Motor: No weakness.   Psychiatric:         Mood and Affect: Mood normal.         Behavior: Behavior normal.         Thought Content: Thought content normal.       Significant Labs: All pertinent labs within the past 24 hours have been reviewed.    Significant Imaging: I have reviewed all pertinent imaging results/findings within the past 24 hours.      Assessment/Plan:      Orthostatic hypotension   see above      Constipation  X ray abdomen with no signs of bowel obstruction  Patient is having bowel movements now         Anemia  H&H stable   Patient is essentially asymptomatic but could be slow likely contributing to her low blood pressures in the morning.    Transfuse as needed  Repeat blood work in the morning.    Closed left hip fracture  Status post ORIF   Caution with opiate pain meds as this could drop her blood pressure   Continue with physical therapy will need placement    Ataxic gait  Unclear etiology  CT and MRI head negative  PT following, family has decided on SNF for placement, CM consulted and following           Hypertension  Blood pressures are somewhat labile.  Will hold amlodipine for the time being and just use clonidine at night  Hopefully this will allow her blood pressures to recover in the morning  However she is on Seroquel twice a day as this can contribute to hypotension.  However the family refuses to stop this as this is only thing that is controlling her behavioral issues with her dementia.    Rheumatoid  arthritis  Continue home medications       Fibromyalgia  Continue home medications       Early onset Alzheimer's disease with behavioral disturbance  A little more confused from baseline, likely related to hospitalization and opioids  Confusion has improved some since decreasing frequency of Percocet  Continue home medications         VTE Risk Mitigation (From admission, onward)         Ordered     enoxaparin injection 40 mg  Daily         01/07/23 1446     IP VTE HIGH RISK PATIENT  Once         01/04/23 2155     Place sequential compression device  Until discontinued         01/04/23 2155                Discharge Planning   DANIEL:      Code Status: DNR   Is the patient medically ready for discharge?:     Reason for patient still in hospital (select all that apply): Treatment  Discharge Plan A: Rehab                  SAMANTHA SOTO MD  Department of Hospital Medicine   [facility] - 4th Floor Medical Telemetry

## 2023-01-15 NOTE — PT/OT/SLP PROGRESS
Occupational Therapy   Treatment    Name: Gisele Meraz  MRN: [id number]  Admitting Diagnosis:  ataxic gait, HTN, RA, fibromyalgia, early onset alzheimer's disease with behavorial disturbances 8 Days Post-Op    Recommendations:     Discharge Recommendations: nursing facility, skilled  Discharge Equipment Recommendations:  shower chair, bedside commode, dressing device  Barriers to discharge:   (severity of deficits)    Assessment:     Gisele Meraz is a 67 y.o. female with a medical diagnosis of ataxic gait, HTN, RA, fibromyalgia, early onset alzheimer's disease with behavorial disturbances . Performance deficits affecting function are weakness, impaired endurance, impaired self care skills, impaired functional mobility, gait instability, impaired balance, impaired cognition, decreased coordination, decreased safety awareness, impaired cardiopulmonary response to activity.     Rehab Prognosis:  Good; patient would benefit from acute skilled OT services to address these deficits and reach maximum level of function.       Plan:     Patient to be seen 6 x/week to address the above listed problems via self-care/home management, therapeutic activities, therapeutic exercises  Plan of Care Expires: 01/23/23  Plan of Care Reviewed with: patient, family    Subjective     Pain/Comfort:  Pain Rating 1: 0/10    Objective:     Communicated with: RN prior to session.  Patient found up in chair with telemetry upon OT entry to room.    General Precautions: Standard, fall    Orthopedic Precautions:   Braces: N/A  Respiratory Status: Room air  BP-112/66     Occupational Performance:     Functional Mobility/Transfers:  Patient completed Sit <> Stand Transfer with moderate assistance  with  rolling walker x 2 attempts with BP dropping to 92/58 upon second stand. RN notified  Functional Mobility: Mod      [facility] 6 Click ADL:      Treatment & Education:  POC and safety    Patient left up in chair with call button in reach and family  present    GOALS:   Multidisciplinary Problems       Occupational Therapy Goals          Problem: Occupational Therapy    Goal Priority Disciplines Outcome Interventions   Occupational Therapy Goal     OT, PT/OT Ongoing, Progressing    Description: Goals to be met by: 1/23/23     Patient will increase functional independence with ADLs by performing:    UE Dressing with Stand-by Assistance.  LE Dressing with Minimal Assistance and Assistive Devices as needed.  Toileting from bedside commode with Minimal Assistance for hygiene and clothing management.                          Time Tracking:     OT Date of Treatment: 01/15/23  OT Start Time: 1105  OT Stop Time: 1128  OT Total Time (min): 23 min    Billable Minutes:Therapeutic Activity 23    OT/VICTORIA: VICTORIA     VICTORIA Visit Number: 5    1/15/2023

## 2023-01-15 NOTE — SUBJECTIVE & OBJECTIVE
Interval History:  Blood pressures are better this morning after discontinuation of amlodipine and place him on clonidine just at night.  She is sitting up in the bed currently with complaints pain is controlled   Vital signs stable afebrile   Working with physical therapy   No complaints   This was discussed with family in the room.      Review of Systems   Constitutional:  Negative for chills, fatigue and fever.   HENT:  Negative for congestion, rhinorrhea and sore throat.    Respiratory:  Negative for cough, chest tightness and shortness of breath.    Cardiovascular:  Negative for chest pain, palpitations and leg swelling.   Gastrointestinal:  Negative for abdominal distention, abdominal pain, constipation, diarrhea, nausea and vomiting.   Genitourinary:  Negative for dysuria.   Musculoskeletal:  Negative for arthralgias, back pain and joint swelling.   Neurological:  Negative for dizziness and headaches.   Psychiatric/Behavioral:  Negative for agitation and confusion.    Objective:     Vital Signs (Most Recent):  Temp: 98.5 °F (36.9 °C) (01/15/23 1528)  Pulse: 82 (01/15/23 1528)  Resp: 18 (01/15/23 1528)  BP: 132/64 (01/15/23 1528)  SpO2: 96 % (01/15/23 1528)   Vital Signs (24h Range):  Temp:  [97.8 °F (36.6 °C)-98.5 °F (36.9 °C)] 98.5 °F (36.9 °C)  Pulse:  [] 82  Resp:  [16-20] 18  SpO2:  [89 %-99 %] 96 %  BP: ()/(58-90) 132/64     Weight: 74.8 kg (165 lb)  Body mass index is 28.32 kg/m².    Intake/Output Summary (Last 24 hours) at 1/15/2023 1635  Last data filed at 1/15/2023 1400  Gross per 24 hour   Intake 870 ml   Output 900 ml   Net -30 ml        Physical Exam  Constitutional:       General: She is not in acute distress.     Appearance: Normal appearance.   HENT:      Head: Normocephalic and atraumatic.   Eyes:      Extraocular Movements: Extraocular movements intact.      Pupils: Pupils are equal, round, and reactive to light.   Cardiovascular:      Rate and Rhythm: Normal rate and regular  rhythm.      Pulses: Normal pulses.      Heart sounds: Normal heart sounds. No murmur heard.    No friction rub. No gallop.   Pulmonary:      Effort: Pulmonary effort is normal.      Breath sounds: Normal breath sounds. No wheezing, rhonchi or rales.   Abdominal:      General: Abdomen is flat. Bowel sounds are normal. There is no distension.      Palpations: Abdomen is soft.      Tenderness: There is no abdominal tenderness.   Musculoskeletal:         General: No swelling or tenderness. Normal range of motion.      Cervical back: Normal range of motion and neck supple. No tenderness.      Right lower leg: No edema.      Left lower leg: No edema.   Lymphadenopathy:      Cervical: No cervical adenopathy.   Skin:     Findings: No lesion or rash.   Neurological:      General: No focal deficit present.      Mental Status: She is alert and oriented to person, place, and time.      Cranial Nerves: No cranial nerve deficit.      Sensory: No sensory deficit.      Motor: No weakness.   Psychiatric:         Mood and Affect: Mood normal.         Behavior: Behavior normal.         Thought Content: Thought content normal.       Significant Labs: All pertinent labs within the past 24 hours have been reviewed.    Significant Imaging: I have reviewed all pertinent imaging results/findings within the past 24 hours.

## 2023-01-16 LAB
BASOPHILS # BLD AUTO: 0.04 X10(3)/MCL (ref 0–0.2)
BASOPHILS NFR BLD AUTO: 0.6 %
EOSINOPHIL # BLD AUTO: 0.26 X10(3)/MCL (ref 0–0.9)
EOSINOPHIL NFR BLD AUTO: 3.7 %
ERYTHROCYTE [DISTWIDTH] IN BLOOD BY AUTOMATED COUNT: 13 % (ref 11.5–17)
HCT VFR BLD AUTO: 24.6 % (ref 37–47)
HGB BLD-MCNC: 7.6 GM/DL (ref 12–16)
IMM GRANULOCYTES # BLD AUTO: 0.1 X10(3)/MCL (ref 0–0.04)
IMM GRANULOCYTES NFR BLD AUTO: 1.4 %
LYMPHOCYTES # BLD AUTO: 2.83 X10(3)/MCL (ref 0.6–4.6)
LYMPHOCYTES NFR BLD AUTO: 39.8 %
MCH RBC QN AUTO: 29.3 PG
MCHC RBC AUTO-ENTMCNC: 30.9 MG/DL (ref 33–36)
MCV RBC AUTO: 95 FL (ref 80–94)
MONOCYTES # BLD AUTO: 0.69 X10(3)/MCL (ref 0.1–1.3)
MONOCYTES NFR BLD AUTO: 9.7 %
NEUTROPHILS # BLD AUTO: 3.19 X10(3)/MCL (ref 2.1–9.2)
NEUTROPHILS NFR BLD AUTO: 44.8 %
NRBC BLD AUTO-RTO: 0 %
PLATELET # BLD AUTO: 355 X10(3)/MCL (ref 130–400)
PMV BLD AUTO: 10.3 FL (ref 7.4–10.4)
RBC # BLD AUTO: 2.59 X10(6)/MCL (ref 4.2–5.4)
WBC # SPEC AUTO: 7.1 X10(3)/MCL (ref 4.5–11.5)

## 2023-01-16 PROCEDURE — 63600175 PHARM REV CODE 636 W HCPCS: Performed by: STUDENT IN AN ORGANIZED HEALTH CARE EDUCATION/TRAINING PROGRAM

## 2023-01-16 PROCEDURE — 97535 SELF CARE MNGMENT TRAINING: CPT

## 2023-01-16 PROCEDURE — 25000003 PHARM REV CODE 250: Performed by: INTERNAL MEDICINE

## 2023-01-16 PROCEDURE — 99232 PR SUBSEQUENT HOSPITAL CARE,LEVL II: ICD-10-PCS | Mod: ,,, | Performed by: INTERNAL MEDICINE

## 2023-01-16 PROCEDURE — 25000003 PHARM REV CODE 250: Performed by: STUDENT IN AN ORGANIZED HEALTH CARE EDUCATION/TRAINING PROGRAM

## 2023-01-16 PROCEDURE — 36415 COLL VENOUS BLD VENIPUNCTURE: CPT | Performed by: INTERNAL MEDICINE

## 2023-01-16 PROCEDURE — 97116 GAIT TRAINING THERAPY: CPT | Mod: CQ

## 2023-01-16 PROCEDURE — 63600175 PHARM REV CODE 636 W HCPCS: Performed by: PHYSICIAN ASSISTANT

## 2023-01-16 PROCEDURE — 25000003 PHARM REV CODE 250: Performed by: PHYSICIAN ASSISTANT

## 2023-01-16 PROCEDURE — 99232 SBSQ HOSP IP/OBS MODERATE 35: CPT | Mod: ,,, | Performed by: INTERNAL MEDICINE

## 2023-01-16 PROCEDURE — 94761 N-INVAS EAR/PLS OXIMETRY MLT: CPT

## 2023-01-16 PROCEDURE — 21400001 HC TELEMETRY ROOM

## 2023-01-16 PROCEDURE — 97168 OT RE-EVAL EST PLAN CARE: CPT

## 2023-01-16 PROCEDURE — 97530 THERAPEUTIC ACTIVITIES: CPT | Mod: CQ

## 2023-01-16 PROCEDURE — 85025 COMPLETE CBC W/AUTO DIFF WBC: CPT | Performed by: INTERNAL MEDICINE

## 2023-01-16 RX ADMIN — OXYCODONE AND ACETAMINOPHEN 1 TABLET: 10; 325 TABLET ORAL at 09:01

## 2023-01-16 RX ADMIN — QUETIAPINE FUMARATE 25 MG: 25 TABLET ORAL at 08:01

## 2023-01-16 RX ADMIN — POLYETHYLENE GLYCOL 3350 17 G: 17 POWDER, FOR SOLUTION ORAL at 08:01

## 2023-01-16 RX ADMIN — OXYCODONE HYDROCHLORIDE AND ACETAMINOPHEN 1 TABLET: 5; 325 TABLET ORAL at 05:01

## 2023-01-16 RX ADMIN — PREDNISONE 5 MG: 5 TABLET ORAL at 08:01

## 2023-01-16 RX ADMIN — MEMANTINE 10 MG: 5 TABLET ORAL at 08:01

## 2023-01-16 RX ADMIN — ENOXAPARIN SODIUM 40 MG: 40 INJECTION SUBCUTANEOUS at 05:01

## 2023-01-16 RX ADMIN — METHOCARBAMOL TABLETS 750 MG: 750 TABLET, COATED ORAL at 09:01

## 2023-01-16 RX ADMIN — OXYCODONE HYDROCHLORIDE AND ACETAMINOPHEN 1 TABLET: 5; 325 TABLET ORAL at 12:01

## 2023-01-16 RX ADMIN — MEMANTINE 10 MG: 5 TABLET ORAL at 09:01

## 2023-01-16 RX ADMIN — DULOXETINE 60 MG: 30 CAPSULE, DELAYED RELEASE ORAL at 09:01

## 2023-01-16 RX ADMIN — OXYCODONE HYDROCHLORIDE AND ACETAMINOPHEN 1 TABLET: 5; 325 TABLET ORAL at 06:01

## 2023-01-16 RX ADMIN — LEFLUNOMIDE 20 MG: 20 TABLET, FILM COATED ORAL at 08:01

## 2023-01-16 RX ADMIN — METHOCARBAMOL TABLETS 750 MG: 750 TABLET, COATED ORAL at 08:01

## 2023-01-16 RX ADMIN — QUETIAPINE FUMARATE 25 MG: 25 TABLET ORAL at 09:01

## 2023-01-16 RX ADMIN — Medication 6 MG: at 09:01

## 2023-01-16 RX ADMIN — METHOCARBAMOL TABLETS 750 MG: 750 TABLET, COATED ORAL at 03:01

## 2023-01-16 RX ADMIN — PANTOPRAZOLE SODIUM 40 MG: 40 TABLET, DELAYED RELEASE ORAL at 08:01

## 2023-01-16 RX ADMIN — DONEPEZIL HYDROCHLORIDE 10 MG: 5 TABLET, FILM COATED ORAL at 08:01

## 2023-01-16 RX ADMIN — DULOXETINE 60 MG: 30 CAPSULE, DELAYED RELEASE ORAL at 08:01

## 2023-01-16 RX ADMIN — DIPHENHYDRAMINE HYDROCHLORIDE 25 MG: 25 CAPSULE ORAL at 09:01

## 2023-01-16 NOTE — ASSESSMENT & PLAN NOTE
Continue current therapy   Physical therapy on board will likely need placement.  Case management on board

## 2023-01-16 NOTE — PLAN OF CARE
Problem: Occupational Therapy  Goal: Occupational Therapy Goal  Description: Goals to be met by: 1/30/23     Patient will increase functional independence with ADLs by performing:    UE Dressing with Stand-by Assistance.  LE Dressing with Minimal Assistance and Assistive Devices as needed.  Toileting from toilet with Minimal Assistance for hygiene and clothing management. - goal upgrade from bedside commode to toilet  Toilet transfer to toilet with Stand-by Assistance. - New goal  Grooming standing at sink with Stand-by Assistance. - New goal    Outcome: Ongoing, Progressing

## 2023-01-16 NOTE — PT/OT/SLP RE-EVAL
Occupational Therapy   Re-evaluation    Name: Gisele Meraz  MRN: [id number]  Admitting Diagnosis:  L hip fx s/p hemiarthroplasty on 1/7  Recent Surgery: Procedure(s) (LRB):  HEMIARTHROPLASTY, HIP (Left) 9 Days Post-Op    Recommendations:     Discharge Recommendations: nursing facility, skilled  Discharge Equipment Recommendations: walker, rolling, shower chair, dressing device  Barriers to discharge:       Assessment:     Gisele Meraz is a 67 y.o. female with a medical diagnosis of L hip fx s/p hemiarthroplasty on 1/7.  She presents with improved mobility since initial evaluation. Performance deficits affecting function are weakness, gait instability, impaired endurance, impaired balance, pain, impaired cognition, impaired self care skills, impaired functional mobility, decreased safety awareness, decreased lower extremity function.      Rehab Prognosis:  Good; patient would benefit from acute skilled OT services to address these deficits and reach maximum level of function.       Plan:     Patient to be seen 6 x/week to address the above listed problems via self-care/home management, therapeutic activities, therapeutic exercises  Plan of Care Expires: 01/30/23  Plan of Care Reviewed with: patient, family    Subjective     Chief Complaint: LLE pain  Patient/Family stated goals: to return to PLOF  Communicated with: nrsg prior to session.  Pain/Comfort:  Pain Rating 1: 5/10  Location - Side 1: Left  Location 1: hip  Pain Addressed 1: Reposition    Objective:     Communicated with: nrsg prior to session.  Patient found  completing toilet transfer with nurse  with: telemetry upon OT entry to room.    General Precautions: Standard, fall  Orthopedic Precautions: LLE weight bearing as tolerated (IR/ER LLE)  Braces: N/A  Respiratory Status: Room air    Occupational Performance:    Bed Mobility:    Patient completed Sit to Supine with moderate assistance    Functional Mobility/Transfers:  Patient completed Sit <> Stand  Transfer with moderate assistance  with  rolling walker   Patient completed Toilet Transfer Step Transfer technique with moderate assistance with  rolling walker  Functional Mobility: Pt ambulated from bathroom to bedside approx 15 ft with min A and RW. Pt requires verbal cues to keep RW close when turning.     Activities of Daily Living:  Toileting: moderate assistance . Assistance provided for clothing management. Perineal care completed seated with SBA.     Cognitive/Visual Perceptual:  Cognitive/Psychosocial Skills:     -       Oriented to: Person   -       Follows Commands/attention:Follows two-step commands  -       Safety awareness/insight to disability: impaired     Physical Exam:  Upper Extremity Strength:    -       Right Upper Extremity: WFL  -       Left Upper Extremity: WFL  Fine Motor Coordination:    -       Intact      Treatment & Education:  OT re-eval completed. Pt provided with self-care training during toileting to increase safety and independence with task.     Patient left HOB elevated with all lines intact, call button in reach, and family present    GOALS:   Multidisciplinary Problems       Occupational Therapy Goals          Problem: Occupational Therapy    Goal Priority Disciplines Outcome Interventions   Occupational Therapy Goal     OT, PT/OT Ongoing, Progressing    Description: Goals to be met by: 1/23/23     Patient will increase functional independence with ADLs by performing:    UE Dressing with Stand-by Assistance.  LE Dressing with Minimal Assistance and Assistive Devices as needed.  Toileting from toilet with Minimal Assistance for hygiene and clothing management. - goal upgrade from bedside commode to toilet  Toilet transfer to toilet with Stand-by Assistance. - New goal  Grooming standing at sink with Stand-by Assistance. - New goal                         History:     Past Medical History:   Diagnosis Date    Acid reflux     Arthritis     Dementia     Patient on Memantine BID     Fibromyalgia     Pneumonia     RA (rheumatoid arthritis)          Past Surgical History:   Procedure Laterality Date    ADENOIDECTOMY       SECTION      HEMIARTHROPLASTY OF HIP Left 2023    Procedure: HEMIARTHROPLASTY, HIP;  Surgeon: Rodrick Andrew DO;  Location: [facility];  Service: Orthopedics;  Laterality: Left;    HYSTERECTOMY      TONSILLECTOMY         Time Tracking:     OT Date of Treatment: 23  OT Start Time: 1257  OT Stop Time: 1323  OT Total Time (min): 26 min    Billable Minutes:Re-eval 15 min  Self Care/Home Management 9 min    2023

## 2023-01-16 NOTE — SUBJECTIVE & OBJECTIVE
Interval History:  Blood pressures are better.    She receiving clonidine night amlodipine is on hold  Work with physical therapy today  She is sitting in the chair eating lunch with no problem   Overall stable   Vital signs stable afebrile.      Review of Systems   Constitutional:  Negative for chills, fatigue and fever.   HENT:  Negative for congestion, rhinorrhea and sore throat.    Respiratory:  Negative for cough, chest tightness and shortness of breath.    Cardiovascular:  Negative for chest pain, palpitations and leg swelling.   Gastrointestinal:  Negative for abdominal distention, abdominal pain, constipation, diarrhea, nausea and vomiting.   Genitourinary:  Negative for dysuria.   Musculoskeletal:  Negative for arthralgias, back pain and joint swelling.   Neurological:  Negative for dizziness and headaches.   Psychiatric/Behavioral:  Negative for agitation and confusion.    Objective:     Vital Signs (Most Recent):  Temp: 98.2 °F (36.8 °C) (01/16/23 1501)  Pulse: 78 (01/16/23 1501)  Resp: 18 (01/16/23 1501)  BP: 106/68 (01/16/23 1501)  SpO2: (!) 93 % (01/16/23 1501)   Vital Signs (24h Range):  Temp:  [97.9 °F (36.6 °C)-98.3 °F (36.8 °C)] 98.2 °F (36.8 °C)  Pulse:  [78-90] 78  Resp:  [18-20] 18  SpO2:  [91 %-95 %] 93 %  BP: (103-172)/(66-81) 106/68     Weight: 74.8 kg (165 lb)  Body mass index is 28.32 kg/m².    Intake/Output Summary (Last 24 hours) at 1/16/2023 1623  Last data filed at 1/16/2023 1251  Gross per 24 hour   Intake 640 ml   Output 400 ml   Net 240 ml        Physical Exam  Constitutional:       General: She is not in acute distress.     Appearance: Normal appearance.   HENT:      Head: Normocephalic and atraumatic.   Eyes:      Extraocular Movements: Extraocular movements intact.      Pupils: Pupils are equal, round, and reactive to light.   Cardiovascular:      Rate and Rhythm: Normal rate and regular rhythm.      Pulses: Normal pulses.      Heart sounds: Normal heart sounds. No murmur heard.     No friction rub. No gallop.   Pulmonary:      Effort: Pulmonary effort is normal.      Breath sounds: Normal breath sounds. No wheezing, rhonchi or rales.   Abdominal:      General: Abdomen is flat. Bowel sounds are normal. There is no distension.      Palpations: Abdomen is soft.      Tenderness: There is no abdominal tenderness.   Musculoskeletal:         General: No swelling or tenderness. Normal range of motion.      Cervical back: Normal range of motion and neck supple. No tenderness.      Right lower leg: No edema.      Left lower leg: No edema.   Lymphadenopathy:      Cervical: No cervical adenopathy.   Skin:     Findings: No lesion or rash.   Neurological:      General: No focal deficit present.      Mental Status: She is alert and oriented to person, place, and time.      Cranial Nerves: No cranial nerve deficit.      Sensory: No sensory deficit.      Motor: No weakness.   Psychiatric:         Mood and Affect: Mood normal.         Behavior: Behavior normal.         Thought Content: Thought content normal.       Significant Labs: All pertinent labs within the past 24 hours have been reviewed.    Significant Imaging: I have reviewed all pertinent imaging results/findings within the past 24 hours.

## 2023-01-16 NOTE — PT/OT/SLP PROGRESS
Physical Therapy Treatment    Patient Name:  Gisele Meraz   MRN:  [id number]    Recommendations:     Discharge Recommendations: nursing facility, skilled  Discharge Equipment Recommendations: walker, rolling  Barriers to discharge: None    Assessment:     Gisele Meraz is a 67 y.o. female admitted with a medical diagnosis of <principal problem not specified>.  She presents with the following impairments/functional limitations: weakness, gait instability, impaired endurance, impaired balance, decreased safety awareness, impaired functional mobility, impaired cognition .    Rehab Prognosis: Good; patient would benefit from acute skilled PT services to address these deficits and reach maximum level of function.    Recent Surgery: Procedure(s) (LRB):  HEMIARTHROPLASTY, HIP (Left) 9 Days Post-Op    Plan:     During this hospitalization, patient to be seen 6 x/week to address the identified rehab impairments via gait training, therapeutic activities and progress toward the following goals:    Plan of Care Expires:  02/07/23    Subjective     Chief Complaint:   Patient/Family Comments/goals:   Pain/Comfort:  Pain Rating 1: 5/10  Location - Side 1: Left  Location 1: hip      Objective:     Communicated with nurse prior to session.  Patient found HOB elevated with telemetry upon PT entry to room.     General Precautions: Standard, fall  Orthopedic Precautions: LLE weight bearing as tolerated  Braces: N/A  Respiratory Status: Room air     Functional Mobility:  Bed Mobility:     Rolling Left:  minimum assistance  Rolling Right: minimum assistance  Supine to Sit: moderate assistance  Transfers:     Sit to Stand:  moderate assistance with rolling walker  Bed to Chair: moderate assistance with  rolling walker  using  Step Transfer  Gait: pt amb for approx 40ft with RW and Zoraida with step to gait pattern and cues for RW mainpulation          Patient left up in chair with all lines intact, call button in reach, and chair alarm  on..    GOALS:   Multidisciplinary Problems       Physical Therapy Goals          Problem: Physical Therapy    Goal Priority Disciplines Outcome Goal Variances Interventions   Physical Therapy Goal     PT, PT/OT Ongoing, Progressing     Description: Goals to be met by: 23     Patient will increase functional independence with mobility by performin. Supine to sit with Stand-by Assistance  2. Sit to supine with Stand-by Assistance  3. Sit to stand transfer Minimal Assistance and RW  4. Gait  x100 feet with Minimal Assistance and RW                          Time Tracking:     PT Received On: 23  PT Start Time: 1025     PT Stop Time: 1050  PT Total Time (min): 25 min     Billable Minutes: Gait Training 10 and Therapeutic Activity 15    Treatment Type: Treatment  PT/PTA: PTA     PTA Visit Number: 3     2023

## 2023-01-16 NOTE — PROGRESS NOTES
[facility] - 4th Floor [facility] Medicine  Progress Note    Patient Name: Gisele Meraz  MRN: [id number]  Patient Class: IP- Inpatient   Admission Date: 1/4/2023  Length of Stay: 12 days  Attending Physician: Cindy Smith MD  Primary Care Provider: Cindy Almaguer MD        Subjective:     Principal Problem:<principal problem not specified>        HPI:  Ms Jaquez is a 68 y/o female patient with past medical history significant for Alzheimer disease, fibromyalgia, rheumatoid arthritis who presented to ED with complaint of elevated blood pressure. Most of history is obtained from daughter in law who is primary caregiver. She states patient started feeling dizzy Tuesday night, they called office and labs were done which showed elevated BUN and creatinine, urine dipstick was obtained at home which was positive for leukocyte esterase. Wednesday morning blood pressure was checked at home and was 200/91, they called office and were instructed to go to ED. Patient denies history of hypertension and is not taking any medications. In ED blood pressure responded to amlodipine and IV hydralazine. Per daughter in law patient was going to be discharged however she was noted to sway towards the right side when walking. CT and MRI head were negative for ischemic stroke. Blood pressure remains elevated but improved. UA was obtained in ED which was negative.       Overview/Hospital Course:  No notes on file    Interval History:  Blood pressures are better.    She receiving clonidine night amlodipine is on hold  Work with physical therapy today  She is sitting in the chair eating lunch with no problem   Overall stable   Vital signs stable afebrile.      Review of Systems   Constitutional:  Negative for chills, fatigue and fever.   HENT:  Negative for congestion, rhinorrhea and sore throat.    Respiratory:  Negative for cough, chest tightness and shortness of breath.    Cardiovascular:   Negative for chest pain, palpitations and leg swelling.   Gastrointestinal:  Negative for abdominal distention, abdominal pain, constipation, diarrhea, nausea and vomiting.   Genitourinary:  Negative for dysuria.   Musculoskeletal:  Negative for arthralgias, back pain and joint swelling.   Neurological:  Negative for dizziness and headaches.   Psychiatric/Behavioral:  Negative for agitation and confusion.    Objective:     Vital Signs (Most Recent):  Temp: 98.2 °F (36.8 °C) (01/16/23 1501)  Pulse: 78 (01/16/23 1501)  Resp: 18 (01/16/23 1501)  BP: 106/68 (01/16/23 1501)  SpO2: (!) 93 % (01/16/23 1501)   Vital Signs (24h Range):  Temp:  [97.9 °F (36.6 °C)-98.3 °F (36.8 °C)] 98.2 °F (36.8 °C)  Pulse:  [78-90] 78  Resp:  [18-20] 18  SpO2:  [91 %-95 %] 93 %  BP: (103-172)/(66-81) 106/68     Weight: 74.8 kg (165 lb)  Body mass index is 28.32 kg/m².    Intake/Output Summary (Last 24 hours) at 1/16/2023 1623  Last data filed at 1/16/2023 1251  Gross per 24 hour   Intake 640 ml   Output 400 ml   Net 240 ml        Physical Exam  Constitutional:       General: She is not in acute distress.     Appearance: Normal appearance.   HENT:      Head: Normocephalic and atraumatic.   Eyes:      Extraocular Movements: Extraocular movements intact.      Pupils: Pupils are equal, round, and reactive to light.   Cardiovascular:      Rate and Rhythm: Normal rate and regular rhythm.      Pulses: Normal pulses.      Heart sounds: Normal heart sounds. No murmur heard.    No friction rub. No gallop.   Pulmonary:      Effort: Pulmonary effort is normal.      Breath sounds: Normal breath sounds. No wheezing, rhonchi or rales.   Abdominal:      General: Abdomen is flat. Bowel sounds are normal. There is no distension.      Palpations: Abdomen is soft.      Tenderness: There is no abdominal tenderness.   Musculoskeletal:         General: No swelling or tenderness. Normal range of motion.      Cervical back: Normal range of motion and neck supple. No  tenderness.      Right lower leg: No edema.      Left lower leg: No edema.   Lymphadenopathy:      Cervical: No cervical adenopathy.   Skin:     Findings: No lesion or rash.   Neurological:      General: No focal deficit present.      Mental Status: She is alert and oriented to person, place, and time.      Cranial Nerves: No cranial nerve deficit.      Sensory: No sensory deficit.      Motor: No weakness.   Psychiatric:         Mood and Affect: Mood normal.         Behavior: Behavior normal.         Thought Content: Thought content normal.       Significant Labs: All pertinent labs within the past 24 hours have been reviewed.    Significant Imaging: I have reviewed all pertinent imaging results/findings within the past 24 hours.      Assessment/Plan:      Orthostatic hypotension   see above      Constipation  X ray abdomen with no signs of bowel obstruction  Patient is having bowel movements now         Anemia  H&H stable   Patient is essentially asymptomatic but could be slow likely contributing to her low blood pressures in the morning.    Transfuse as needed  Repeat blood work in the morning.    Closed left hip fracture  Status post ORIF   Caution with opiate pain meds as this could drop her blood pressure   Continue with physical therapy will need placement    Ataxic gait  Continue current therapy   Physical therapy on board will likely need placement.  Case management on board      Hypertension  Blood pressures are somewhat labile.  Will hold amlodipine for the time being and just use clonidine at night  Hopefully this will allow her blood pressures to recover in the morning  However she is on Seroquel twice a day as this can contribute to hypotension.  However the family refuses to stop this as this is only thing that is controlling her behavioral issues with her dementia.    Rheumatoid arthritis  Continue home medications       Fibromyalgia  Continue home medications       Early onset Alzheimer's disease  with behavioral disturbance  A little more confused from baseline, likely related to hospitalization and opioids  Confusion has improved some since decreasing frequency of Percocet  Continue home medications         VTE Risk Mitigation (From admission, onward)         Ordered     enoxaparin injection 40 mg  Daily         01/07/23 1446     IP VTE HIGH RISK PATIENT  Once         01/04/23 2155     Place sequential compression device  Until discontinued         01/04/23 2155                Discharge Planning   DANIEL:      Code Status: DNR   Is the patient medically ready for discharge?:     Reason for patient still in hospital (select all that apply): Treatment  Discharge Plan A: Rehab                  SAMANTHA SOTO MD  Department of Hospital Medicine   [facility] - 4th Floor Medical Telemetry

## 2023-01-17 LAB
BASOPHILS # BLD AUTO: 0.06 X10(3)/MCL (ref 0–0.2)
BASOPHILS NFR BLD AUTO: 0.6 %
EOSINOPHIL # BLD AUTO: 0.09 X10(3)/MCL (ref 0–0.9)
EOSINOPHIL NFR BLD AUTO: 1 %
ERYTHROCYTE [DISTWIDTH] IN BLOOD BY AUTOMATED COUNT: 13.7 % (ref 11.5–17)
HCT VFR BLD AUTO: 26.4 % (ref 37–47)
HGB BLD-MCNC: 8.1 GM/DL (ref 12–16)
IMM GRANULOCYTES # BLD AUTO: 0.11 X10(3)/MCL (ref 0–0.04)
IMM GRANULOCYTES NFR BLD AUTO: 1.2 %
LYMPHOCYTES # BLD AUTO: 1.18 X10(3)/MCL (ref 0.6–4.6)
LYMPHOCYTES NFR BLD AUTO: 12.7 %
MCH RBC QN AUTO: 29.5 PG
MCHC RBC AUTO-ENTMCNC: 30.7 MG/DL (ref 33–36)
MCV RBC AUTO: 96 FL (ref 80–94)
MONOCYTES # BLD AUTO: 0.69 X10(3)/MCL (ref 0.1–1.3)
MONOCYTES NFR BLD AUTO: 7.4 %
NEUTROPHILS # BLD AUTO: 7.19 X10(3)/MCL (ref 2.1–9.2)
NEUTROPHILS NFR BLD AUTO: 77.1 %
NRBC BLD AUTO-RTO: 0 %
PLATELET # BLD AUTO: 414 X10(3)/MCL (ref 130–400)
PMV BLD AUTO: 9.9 FL (ref 7.4–10.4)
RBC # BLD AUTO: 2.75 X10(6)/MCL (ref 4.2–5.4)
WBC # SPEC AUTO: 9.3 X10(3)/MCL (ref 4.5–11.5)

## 2023-01-17 PROCEDURE — 99233 PR SUBSEQUENT HOSPITAL CARE,LEVL III: ICD-10-PCS | Mod: ,,, | Performed by: STUDENT IN AN ORGANIZED HEALTH CARE EDUCATION/TRAINING PROGRAM

## 2023-01-17 PROCEDURE — 97530 THERAPEUTIC ACTIVITIES: CPT | Mod: CQ

## 2023-01-17 PROCEDURE — 97116 GAIT TRAINING THERAPY: CPT | Mod: CQ

## 2023-01-17 PROCEDURE — 97535 SELF CARE MNGMENT TRAINING: CPT

## 2023-01-17 PROCEDURE — 63600175 PHARM REV CODE 636 W HCPCS: Performed by: PHYSICIAN ASSISTANT

## 2023-01-17 PROCEDURE — 36415 COLL VENOUS BLD VENIPUNCTURE: CPT | Performed by: STUDENT IN AN ORGANIZED HEALTH CARE EDUCATION/TRAINING PROGRAM

## 2023-01-17 PROCEDURE — 25000003 PHARM REV CODE 250: Performed by: STUDENT IN AN ORGANIZED HEALTH CARE EDUCATION/TRAINING PROGRAM

## 2023-01-17 PROCEDURE — 21400001 HC TELEMETRY ROOM

## 2023-01-17 PROCEDURE — 99233 SBSQ HOSP IP/OBS HIGH 50: CPT | Mod: ,,, | Performed by: STUDENT IN AN ORGANIZED HEALTH CARE EDUCATION/TRAINING PROGRAM

## 2023-01-17 PROCEDURE — 85025 COMPLETE CBC W/AUTO DIFF WBC: CPT | Performed by: STUDENT IN AN ORGANIZED HEALTH CARE EDUCATION/TRAINING PROGRAM

## 2023-01-17 PROCEDURE — 25000003 PHARM REV CODE 250: Performed by: PHYSICIAN ASSISTANT

## 2023-01-17 PROCEDURE — 63600175 PHARM REV CODE 636 W HCPCS: Performed by: STUDENT IN AN ORGANIZED HEALTH CARE EDUCATION/TRAINING PROGRAM

## 2023-01-17 PROCEDURE — 25000003 PHARM REV CODE 250: Performed by: INTERNAL MEDICINE

## 2023-01-17 RX ADMIN — DULOXETINE 60 MG: 30 CAPSULE, DELAYED RELEASE ORAL at 09:01

## 2023-01-17 RX ADMIN — ENOXAPARIN SODIUM 40 MG: 40 INJECTION SUBCUTANEOUS at 05:01

## 2023-01-17 RX ADMIN — PREDNISONE 5 MG: 5 TABLET ORAL at 10:01

## 2023-01-17 RX ADMIN — QUETIAPINE FUMARATE 25 MG: 25 TABLET ORAL at 09:01

## 2023-01-17 RX ADMIN — OXYCODONE HYDROCHLORIDE AND ACETAMINOPHEN 1 TABLET: 5; 325 TABLET ORAL at 05:01

## 2023-01-17 RX ADMIN — METHOCARBAMOL TABLETS 750 MG: 750 TABLET, COATED ORAL at 10:01

## 2023-01-17 RX ADMIN — DOCUSATE SODIUM 50 MG: 50 CAPSULE, LIQUID FILLED ORAL at 10:01

## 2023-01-17 RX ADMIN — METHOCARBAMOL TABLETS 750 MG: 750 TABLET, COATED ORAL at 09:01

## 2023-01-17 RX ADMIN — DULOXETINE 60 MG: 30 CAPSULE, DELAYED RELEASE ORAL at 10:01

## 2023-01-17 RX ADMIN — MEMANTINE 10 MG: 5 TABLET ORAL at 09:01

## 2023-01-17 RX ADMIN — Medication 6 MG: at 09:01

## 2023-01-17 RX ADMIN — PANTOPRAZOLE SODIUM 40 MG: 40 TABLET, DELAYED RELEASE ORAL at 10:01

## 2023-01-17 RX ADMIN — DONEPEZIL HYDROCHLORIDE 10 MG: 5 TABLET, FILM COATED ORAL at 10:01

## 2023-01-17 RX ADMIN — QUETIAPINE FUMARATE 25 MG: 25 TABLET ORAL at 10:01

## 2023-01-17 RX ADMIN — MEMANTINE 10 MG: 5 TABLET ORAL at 10:01

## 2023-01-17 RX ADMIN — LEFLUNOMIDE 20 MG: 20 TABLET, FILM COATED ORAL at 10:01

## 2023-01-17 RX ADMIN — ACETAMINOPHEN 650 MG: 325 TABLET, FILM COATED ORAL at 09:01

## 2023-01-17 RX ADMIN — OXYCODONE HYDROCHLORIDE AND ACETAMINOPHEN 1 TABLET: 5; 325 TABLET ORAL at 06:01

## 2023-01-17 RX ADMIN — METHOCARBAMOL TABLETS 750 MG: 750 TABLET, COATED ORAL at 03:01

## 2023-01-17 RX ADMIN — OXYCODONE AND ACETAMINOPHEN 1 TABLET: 10; 325 TABLET ORAL at 09:01

## 2023-01-17 RX ADMIN — DIPHENHYDRAMINE HYDROCHLORIDE 25 MG: 25 CAPSULE ORAL at 09:01

## 2023-01-17 RX ADMIN — OXYCODONE HYDROCHLORIDE AND ACETAMINOPHEN 1 TABLET: 5; 325 TABLET ORAL at 12:01

## 2023-01-17 NOTE — PT/OT/SLP PROGRESS
Physical Therapy Treatment    Patient Name:  Gisele Meraz   MRN:  [id number]    Recommendations:     Discharge Recommendations: rehabilitation facility  Discharge Equipment Recommendations: walker, rolling  Barriers to discharge: None    Assessment:     Gisele Meraz is a 67 y.o. female admitted with a medical diagnosis of <principal problem not specified>.  She presents with the following impairments/functional limitations: weakness, gait instability, impaired endurance, impaired balance, decreased safety awareness, impaired cognition, impaired functional mobility, pain .    Rehab Prognosis: Good; patient would benefit from acute skilled PT services to address these deficits and reach maximum level of function.    Recent Surgery: Procedure(s) (LRB):  HEMIARTHROPLASTY, HIP (Left) 10 Days Post-Op    Plan:     During this hospitalization, patient to be seen 6 x/week to address the identified rehab impairments via gait training, therapeutic activities and progress toward the following goals:    Plan of Care Expires:  02/07/23    Subjective     Chief Complaint:   Patient/Family Comments/goals:   Pain/Comfort:         Objective:     Communicated with nurse prior to session.  Patient found up in chair with telemetry, peripheral IV upon PT entry to room.     General Precautions: Standard, fall  Orthopedic Precautions: LLE weight bearing as tolerated  Braces: N/A  Respiratory Status: Room air     Functional Mobility:  Bed Mobility:     Sit to Supine: moderate assistance  Transfers:     Sit to Stand:  minimum assistance with rolling walker  Bed to Chair: minimum assistance with  rolling walker  using  Step Transfer  Gait: pt amb x2 trials (49ft and 12ft) with Zoraida and Rw with increased cuing for safety awareness; noted step to gait pattern        Patient left HOB elevated with all lines intact, call button in reach, and restraints reapplied at end of session..    GOALS:   Multidisciplinary Problems       Physical Therapy  Goals          Problem: Physical Therapy    Goal Priority Disciplines Outcome Goal Variances Interventions   Physical Therapy Goal     PT, PT/OT Ongoing, Progressing     Description: Goals to be met by: 23     Patient will increase functional independence with mobility by performin. Supine to sit with Stand-by Assistance  2. Sit to supine with Stand-by Assistance  3. Sit to stand transfer Minimal Assistance and RW  4. Gait  x100 feet with Minimal Assistance and RW                          Time Tracking:     PT Received On: 23  PT Start Time: 1444     PT Stop Time: 1508  PT Total Time (min): 24 min     Billable Minutes: Gait Training 12 and Therapeutic Activity 12    Treatment Type: Treatment  PT/PTA: PTA     PTA Visit Number: 4     2023

## 2023-01-17 NOTE — PT/OT/SLP PROGRESS
Occupational Therapy   Treatment    Name: Gisele Meraz  MRN: [id number]  Admitting Diagnosis: ataxic gait, HTN, RA, fibromyalgia, early onset Alzheimer's disease with behavioral disturbance  Recent Surgery: Procedure(s) (LRB):  HEMIARTHROPLASTY, HIP (Left) 10 Days Post-Op    Recommendations:     Discharge Recommendations: nursing facility, skilled   Discharge Equipment Recommendations: dressing devices, shower chair, bedside commode (TBD, pending progress)  Barriers to discharge: placement    Assessment:     Gisele Meraz is a 67 y.o. female with a medical diagnosis of ataxic gait, HTN, RA, fibromyalgia, early onset Alzheimer's disease with behavioral disturbance. She presents with c/o pain in L LE/hip and dizziness during session. Performance deficits affecting function are weakness, gait instability, impaired endurance, impaired balance, pain, impaired cognition, impaired self care skills, impaired functional mobility, decreased safety awareness, decreased lower extremity function.     Rehab Prognosis: Good; patient would benefit from acute skilled OT services to address these deficits and reach maximum level of function.       Plan:     Patient to be seen 6 x/week to address the above listed problems via self-care/home management, therapeutic activities, therapeutic exercises  Plan of Care Expires: 01/30/23  Plan of Care Reviewed with: patient (DIL)    Subjective     Pain/Comfort:  Pt c/o pain in L LE/hip during session.  Pt also c/o dizziness with activity. Pt's BP checked throughout, and RN notified.    Objective:     Communicated with: RN prior to session. Patient found with HOB slightly elevated with telemetry, SCD, PureWick upon OT entry to room.    General Precautions: Standard, fall, L hip pxns (no internal or external rotation x6 weeks otherwise ROMAT with abduction pillow while in bed per MD)  Orthopedic Precautions: LLE WBAT  Braces: Hip abduction pillow while in bed  Respiratory Status: RA  Vitals:  O2:  94-95%  NM: 85, 97, 105  BP:    Lying with HOB slightly elevated: 123/71   Seated EOB (pt with c/o dizziness): 152/69   Post-shower task (pt with c/o dizziness): 101/70   Note: RN was notified of pt's c/o dizziness.     Occupational Performance:     Bed Mobility/Functional Mobility/Transfers:    Pt t/f from lying with HOB slightly elevated to seated EOB with min A provided to lift LLE and verbal and visual cues provided to lift trunk.  Pt performed sit > stand transfers from EOB and shower chair with mod A provided, using RW and GB along wall in shower. Pt performed sit > stand from chair (with foam cushion) with min A provided, using RW.   Pt ambulated from chair at bedside to bathroom using RW with CGA, vcs, and seated rest break x1 provided.  Pt performed shower t/f with vcs and mod A provided to properly manage RW.    Activities of Daily Living:  Grooming: Pt combed hair with min A provided. Pt applied deodorant with setup A provided.  Showering: Pt performed task with mod A provided, using LH sponge.  LB Dressing: Pt doffed/donned B socks with min A provided, using dressing stick and sock-aid. Pt required assist to don L sock during task 2/2 increased fatigue. Pt doffed/donned diaper with min A provided, using dressing stick and reacher. Pt required assist to don diaper over B hips/buttocks while standing, using RW.     Patient left UIC with foam cushion, all lines intact, chair alarm on, call button in reach, RN notified, and pt's DIL present.    GOALS:   Multidisciplinary Problems       Occupational Therapy Goals          Problem: Occupational Therapy    Goal Priority Disciplines Outcome Interventions   Occupational Therapy Goal     OT, PT/OT Ongoing, Progressing    Description: Goals to be met by: 1/23/23     Patient will increase functional independence with ADLs by performing:    UE Dressing with Stand-by Assistance.  LE Dressing with Minimal Assistance and Assistive Devices as needed.  Toileting from  toilet with Minimal Assistance for hygiene and clothing management. - goal upgrade from bedside commode to toilet  Toilet transfer to toilet with Stand-by Assistance. - New goal  Grooming standing at sink with Stand-by Assistance. - New goal                         Time Tracking:     OT Date of Treatment: 1/17/23  OT Start Time: 1049  OT Stop Time: 1204  OT Total Time (min): 75 mins    Billable Minutes: Self Care/Home Management 75 mins    OT/VICTORIA: OT     VICTORIA Visit Number: 1 1/17/2023

## 2023-01-17 NOTE — PROGRESS NOTES
[facility] - 3rd Floor Medical Telemetry  Orthopedics  Progress Note    Patient Name: Gisele Meraz  MRN: [id number]  Admission Date: 1/4/2023  Hospital Length of Stay: 13 days  Attending Provider: Cindy Smith MD  Primary Care Provider: Cindy Almaguer MD  Follow-up For: Procedure(s) (LRB):  HEMIARTHROPLASTY, HIP (Left)    Post-Operative Day: 1 Day Post-Op  Subjective:     Principal Problem:<principal problem not specified>    Principal Orthopedic Problem: 10 Days Post-Op   Left hip hemiarthroplasty    Interval History: Patient with left femoral neck fx s/p hemiarthroplasty. Resting comfortably this morning. Pain improving overall. Continues to mobilize with physical therapy. No acute complaints otherwise.    Review of patient's allergies indicates:   Allergen Reactions    Penicillin Hives    Penicillins Swelling       Current Facility-Administered Medications   Medication    0.9%  NaCl infusion (for blood administration)    0.9%  NaCl infusion    acetaminophen tablet 650 mg    bisacodyL suppository 10 mg    cloNIDine tablet 0.1 mg    diphenhydrAMINE capsule 25 mg    docusate sodium capsule 50 mg    donepeziL tablet 10 mg    DULoxetine DR capsule 60 mg    enoxaparin injection 40 mg    fentaNYL injection 25 mcg    hydrALAZINE injection 5 mg    leflunomide tablet 20 mg    LIDOcaine (PF) 10 mg/ml (1%) injection 10 mg    melatonin tablet 6 mg    memantine tablet 10 mg    methocarbamoL tablet 750 mg    midazolam (VERSED) 1 mg/mL injection 1 mg    morphine injection 2 mg    morphine injection 4 mg    naloxone 0.4 mg/mL injection 0.4 mg    ondansetron disintegrating tablet 8 mg    ondansetron disintegrating tablet 8 mg    oxyCODONE-acetaminophen  mg per tablet 1 tablet    oxyCODONE-acetaminophen 5-325 mg per tablet 1 tablet    pantoprazole EC tablet 40 mg    polyethylene glycol packet 17 g    predniSONE tablet 5 mg    QUEtiapine tablet 25 mg    senna tablet 8.6 mg    sodium chloride  "0.9% flush 10 mL     Objective:     Vital Signs (Most Recent):  Temp: 98 °F (36.7 °C) (01/17/23 0334)  Pulse: 90 (01/17/23 0334)  Resp: 18 (01/17/23 0613)  BP: (!) 166/91 (01/17/23 0334)  SpO2: 97 % (01/17/23 0334) Vital Signs (24h Range):  Temp:  [97.9 °F (36.6 °C)-98.3 °F (36.8 °C)] 98 °F (36.7 °C)  Pulse:  [78-95] 90  Resp:  [18-19] 18  SpO2:  [91 %-97 %] 97 %  BP: (103-166)/(66-91) 166/91     Weight: 74.8 kg (165 lb)  Height: 5' 4" (162.6 cm)  Body mass index is 28.32 kg/m².      Intake/Output Summary (Last 24 hours) at 1/17/2023 0735  Last data filed at 1/17/2023 0631  Gross per 24 hour   Intake 480 ml   Output 650 ml   Net -170 ml         Physical Exam:   Musculoskeletal:       Left lower extremity: Dressing is CDI without drainage. Dermabond tape in place; compartments are soft and compressible; No pain with ROM at the hip, knee, or ankle;appropriate tenderness to palpation; dorsi/plantar flexes the foot; SILT distally; BCR distally; DP pulse 2+      Diagnostic Findings:   Significant Labs:   Recent Lab Results         01/16/23  0344        Baso # 0.04       Basophil % 0.6       Eos # 0.26       Eosinophil % 3.7       Hematocrit 24.6       Hemoglobin 7.6       Immature Grans (Abs) 0.10       Immature Granulocytes 1.4       Lymph # 2.83       LYMPH % 39.8       MCH 29.3       MCHC 30.9       MCV 95.0       Mono # 0.69       Mono % 9.7       MPV 10.3       Neut # 3.19       Neut % 44.8       nRBC 0.0       Platelets 355       RBC 2.59       RDW 13.0       WBC 7.1                Significant Imaging: I have reviewed and interpreted all pertinent imaging results/findings.     Assessment/Plan:     Active Diagnoses:    Diagnosis Date Noted POA    Orthostatic hypotension [I95.1] 01/12/2023 No    Constipation [K59.00] 01/11/2023 No    Anemia [D64.9] 01/09/2023 Yes    Closed left hip fracture [S72.002A] 01/06/2023 No    Hypertension [I10] 01/05/2023 Yes    Ataxic gait [R26.0] 01/05/2023 Yes    Fibromyalgia [M79.7] " 05/16/2022 Yes    Rheumatoid arthritis [M06.9] 05/16/2022 Yes    Early onset Alzheimer's disease with behavioral disturbance [G30.0, F02.818] 03/29/2021 Yes      Problems Resolved During this Admission:     Continue multimodal pain control. Utilize robaxin, Norco, and tylenol as needed.   WBAT and no internal or external rotation x 6 weeks otherwise ROMAT   Daily dressing changesDermabond tape below island dressing is to remain in place until follow up or x 3 weeks.   Lovenox for DVT ppx during stay followed by aspirin 81 mg bid upon discharge  Follow up with Dr. Andrew in approx 3 weeks for wound check and repeat x rays.   She is orthopaedically stable for discharge at this time. Please call with any questions or concerns.       The above findings, diagnostics, and treatment plan were discussed with Dr. Andrew who is in agreement with the plan of care except as stated in additional documentation.      Harriet Rudd PA-C  Orthopedic Trauma Surgery  [facility]

## 2023-01-17 NOTE — PROGRESS NOTES
"Inpatient Nutrition Evaluation    Admit Date: 1/4/2023   Total duration of encounter: 13 days    Nutrition Recommendation/Prescription     Continue oral diet as tolerated.  Continue Boost (provides 240 kcal, 10 g protein per serving).  Encouraged intake.    Nutrition Assessment     Chart Review    Reason Seen: length of stay and follow-up    Malnutrition Screening Tool Results   Have you recently lost weight without trying?: No  Have you been eating poorly because of a decreased appetite?: No   MST Score: 0     Diagnosis:  Anemia  Closed L hip fracture s/p ORIF  Ataxic gait  HTN  Early onset Alzheimer's disease with behavioral disturbance    Relevant Medical History: Alzheimer disease, RA, fibromyalgia     Nutrition-Related Medications: NS, docusate sodium, pantoprazole, Miralax, prednisone    Nutrition-Related Labs:   1/10/23 Ca 8.3, Alb 2.3    Diet Order: Diet Adult Regular  Oral Supplement Order: Boost  Appetite/Oral Intake: fair/50-75% of meals  Factors Affecting Nutritional Intake: constipation and decreased appetite (both resolving)  Food/Mandaeism/Cultural Preferences: none reported  Food Allergies: none reported    Skin Integrity: intact, incision  Wound(s):       Comments    1/10: spoke with daughter, who reports poor appetite this AM, eating couple bites, with good appetite prior. Agreed to ONS. Pt with constipation, no BM x 7 days. Daughter unsure of weight loss at this time. Weight of 74.8 kg (165 lb) on 1/5 and previous weight of 160 lb on 8/22. Weight stable at this time, will continue to monitor.    1/17/23 Patient reports good appetite, family at bedside reports fair appetite/intake and seems to be improving, likes Boost.    Anthropometrics    Height: 5' 4" (162.6 cm) Height Method: Stated  Last Weight: 74.8 kg (165 lb) (01/05/23 1000) Weight Method: Standard Scale  BMI (Calculated): 28.3  BMI Classification: overweight (BMI 25-29.9)     Ideal Body Weight (IBW), Female: 120 lb     % Ideal Body " Weight, Female (lb): 137.5 %                             Usual Weight Provided By: EMR weight history    Wt Readings from Last 3 Encounters:   01/05/23 1000 74.8 kg (165 lb)   01/04/23 0928 74.8 kg (165 lb)   08/22/22 2135 72.6 kg (160 lb)   08/18/22 1030 72.3 kg (159 lb 6.4 oz)      Weight Change(s) Since Admission:  Admit Weight: 74.8 kg (165 lb) (01/04/23 0928)  No new weights (1/17/2023)    Patient Education    Not applicable.    Monitoring & Evaluation     Dietitian will monitor food and beverage intake and weight change.  Nutrition Risk/Follow-Up: low (follow-up in 5-7 days)  Patients assigned 'low nutrition risk' status do not qualify for a full nutritional assessment but will be monitored and re-evaluated in a 5-7 day time period. Please consult if re-evaluation needed sooner.

## 2023-01-18 VITALS
BODY MASS INDEX: 28.17 KG/M2 | HEART RATE: 95 BPM | HEIGHT: 64 IN | TEMPERATURE: 98 F | WEIGHT: 165 LBS | RESPIRATION RATE: 18 BRPM | OXYGEN SATURATION: 96 % | DIASTOLIC BLOOD PRESSURE: 71 MMHG | SYSTOLIC BLOOD PRESSURE: 111 MMHG

## 2023-01-18 PROCEDURE — 97535 SELF CARE MNGMENT TRAINING: CPT

## 2023-01-18 PROCEDURE — 25000003 PHARM REV CODE 250: Performed by: INTERNAL MEDICINE

## 2023-01-18 PROCEDURE — 25000003 PHARM REV CODE 250: Performed by: PHYSICIAN ASSISTANT

## 2023-01-18 PROCEDURE — 99239 PR HOSPITAL DISCHARGE DAY,>30 MIN: ICD-10-PCS | Mod: ,,, | Performed by: STUDENT IN AN ORGANIZED HEALTH CARE EDUCATION/TRAINING PROGRAM

## 2023-01-18 PROCEDURE — 63600175 PHARM REV CODE 636 W HCPCS: Performed by: STUDENT IN AN ORGANIZED HEALTH CARE EDUCATION/TRAINING PROGRAM

## 2023-01-18 PROCEDURE — 99239 HOSP IP/OBS DSCHRG MGMT >30: CPT | Mod: ,,, | Performed by: STUDENT IN AN ORGANIZED HEALTH CARE EDUCATION/TRAINING PROGRAM

## 2023-01-18 PROCEDURE — 25000003 PHARM REV CODE 250: Performed by: STUDENT IN AN ORGANIZED HEALTH CARE EDUCATION/TRAINING PROGRAM

## 2023-01-18 RX ORDER — BISACODYL 10 MG
10 SUPPOSITORY, RECTAL RECTAL DAILY PRN
Qty: 30 SUPPOSITORY | Refills: 0 | Status: SHIPPED | OUTPATIENT
Start: 2023-01-18 | End: 2023-04-27

## 2023-01-18 RX ORDER — METHOCARBAMOL 750 MG/1
750 TABLET, FILM COATED ORAL 3 TIMES DAILY
Qty: 30 TABLET | Refills: 0 | Status: SHIPPED | OUTPATIENT
Start: 2023-01-18 | End: 2023-01-28

## 2023-01-18 RX ORDER — ONDANSETRON 8 MG/1
8 TABLET, ORALLY DISINTEGRATING ORAL EVERY 8 HOURS PRN
Qty: 30 TABLET | Refills: 0 | Status: SHIPPED | OUTPATIENT
Start: 2023-01-18

## 2023-01-18 RX ORDER — CLONIDINE HYDROCHLORIDE 0.1 MG/1
0.1 TABLET ORAL NIGHTLY PRN
Qty: 30 TABLET | Refills: 0 | Status: SHIPPED | OUTPATIENT
Start: 2023-01-18 | End: 2023-04-27

## 2023-01-18 RX ORDER — DONEPEZIL HYDROCHLORIDE 10 MG/1
10 TABLET, FILM COATED ORAL DAILY
Qty: 90 TABLET | Refills: 3 | Status: SHIPPED | OUTPATIENT
Start: 2023-01-18

## 2023-01-18 RX ORDER — OXYCODONE AND ACETAMINOPHEN 10; 325 MG/1; MG/1
1 TABLET ORAL EVERY 4 HOURS PRN
Qty: 30 TABLET | Refills: 0 | Status: SHIPPED | OUTPATIENT
Start: 2023-01-18 | End: 2023-01-19 | Stop reason: SDUPTHER

## 2023-01-18 RX ORDER — QUETIAPINE FUMARATE 25 MG/1
TABLET, FILM COATED ORAL
Qty: 60 TABLET | Refills: 3 | Status: SHIPPED | OUTPATIENT
Start: 2023-01-18

## 2023-01-18 RX ORDER — TALC
6 POWDER (GRAM) TOPICAL NIGHTLY PRN
Qty: 30 TABLET | Refills: 0 | Status: SHIPPED | OUTPATIENT
Start: 2023-01-18

## 2023-01-18 RX ADMIN — PANTOPRAZOLE SODIUM 40 MG: 40 TABLET, DELAYED RELEASE ORAL at 10:01

## 2023-01-18 RX ADMIN — OXYCODONE HYDROCHLORIDE AND ACETAMINOPHEN 1 TABLET: 5; 325 TABLET ORAL at 12:01

## 2023-01-18 RX ADMIN — METHOCARBAMOL TABLETS 750 MG: 750 TABLET, COATED ORAL at 10:01

## 2023-01-18 RX ADMIN — QUETIAPINE FUMARATE 25 MG: 25 TABLET ORAL at 10:01

## 2023-01-18 RX ADMIN — DOCUSATE SODIUM 50 MG: 50 CAPSULE, LIQUID FILLED ORAL at 10:01

## 2023-01-18 RX ADMIN — DULOXETINE 60 MG: 30 CAPSULE, DELAYED RELEASE ORAL at 10:01

## 2023-01-18 RX ADMIN — PREDNISONE 5 MG: 5 TABLET ORAL at 10:01

## 2023-01-18 RX ADMIN — OXYCODONE HYDROCHLORIDE AND ACETAMINOPHEN 1 TABLET: 5; 325 TABLET ORAL at 05:01

## 2023-01-18 RX ADMIN — POLYETHYLENE GLYCOL 3350 17 G: 17 POWDER, FOR SOLUTION ORAL at 10:01

## 2023-01-18 RX ADMIN — MEMANTINE 10 MG: 5 TABLET ORAL at 10:01

## 2023-01-18 RX ADMIN — LEFLUNOMIDE 20 MG: 20 TABLET, FILM COATED ORAL at 10:01

## 2023-01-18 RX ADMIN — DONEPEZIL HYDROCHLORIDE 10 MG: 5 TABLET, FILM COATED ORAL at 10:01

## 2023-01-18 NOTE — SUBJECTIVE & OBJECTIVE
Interval History: Patient is doing well, no events over the weekend. Pain is well controlled, blood pressure has improved. Patient has been accepted to [facility], will be transferred there tomorrow morning.     Review of Systems   Constitutional:  Negative for chills, fatigue and fever.   HENT:  Negative for congestion, rhinorrhea and sore throat.    Respiratory:  Negative for cough, chest tightness and shortness of breath.    Cardiovascular:  Negative for chest pain, palpitations and leg swelling.   Gastrointestinal:  Negative for abdominal distention, abdominal pain, constipation, diarrhea, nausea and vomiting.   Genitourinary:  Negative for dysuria.   Musculoskeletal:  Negative for arthralgias, back pain and joint swelling.   Neurological:  Negative for dizziness and headaches.   Psychiatric/Behavioral:  Negative for agitation and confusion.    Objective:     Vital Signs (Most Recent):  Temp: 98.3 °F (36.8 °C) (01/17/23 1534)  Pulse: 86 (01/17/23 1534)  Resp: 20 (01/17/23 1736)  BP: 136/77 (01/17/23 1534)  SpO2: (!) 92 % (01/17/23 1534)   Vital Signs (24h Range):  Temp:  [97.9 °F (36.6 °C)-98.4 °F (36.9 °C)] 98.3 °F (36.8 °C)  Pulse:  [84-95] 86  Resp:  [18-20] 20  SpO2:  [92 %-97 %] 92 %  BP: (136-166)/(62-91) 136/77     Weight: 74.8 kg (165 lb)  Body mass index is 28.32 kg/m².    Intake/Output Summary (Last 24 hours) at 1/17/2023 1900  Last data filed at 1/17/2023 1743  Gross per 24 hour   Intake 840 ml   Output 900 ml   Net -60 ml      Physical Exam  Constitutional:       General: She is not in acute distress.     Appearance: Normal appearance.   HENT:      Head: Normocephalic and atraumatic.   Eyes:      Extraocular Movements: Extraocular movements intact.      Pupils: Pupils are equal, round, and reactive to light.   Cardiovascular:      Rate and Rhythm: Normal rate and regular rhythm.      Pulses: Normal pulses.      Heart sounds: Normal heart sounds. No murmur heard.    No friction rub. No gallop.    Pulmonary:      Effort: Pulmonary effort is normal.      Breath sounds: Normal breath sounds. No wheezing, rhonchi or rales.   Abdominal:      General: Abdomen is flat. Bowel sounds are normal. There is no distension.      Palpations: Abdomen is soft.      Tenderness: There is no abdominal tenderness.   Musculoskeletal:         General: No swelling or tenderness. Normal range of motion.      Cervical back: Normal range of motion and neck supple. No tenderness.      Right lower leg: No edema.      Left lower leg: No edema.   Lymphadenopathy:      Cervical: No cervical adenopathy.   Skin:     Findings: No lesion or rash.   Neurological:      General: No focal deficit present.      Mental Status: She is alert.      Cranial Nerves: No cranial nerve deficit.      Sensory: No sensory deficit.      Motor: No weakness.       Significant Labs: All pertinent labs within the past 24 hours have been reviewed.    Significant Imaging: I have reviewed all pertinent imaging results/findings within the past 24 hours.

## 2023-01-18 NOTE — DISCHARGE SUMMARY
[facility] - 4th Floor [facility] Medicine  Discharge Summary      Patient Name: Gisele Meraz  MRN: [id number]  [facility]: [id number]  Patient Class: IP- Inpatient  Admission Date: 1/4/2023  Hospital Length of Stay: 14 days  Discharge Date and Time:  01/18/2023 5:59 PM  Attending Physician: Cindy Smith MD   Discharging Provider: Cindy Almaguer MD  Primary Care Provider: Cindy Almaguer MD    Primary Care Team: Networked reference to record PCT     HPI:   Ms Jaquez is a 68 y/o female patient with past medical history significant for Alzheimer disease, fibromyalgia, rheumatoid arthritis who presented to ED with complaint of elevated blood pressure. Most of history is obtained from daughter in law who is primary caregiver. She states patient started feeling dizzy Tuesday night, they called office and labs were done which showed elevated BUN and creatinine, urine dipstick was obtained at home which was positive for leukocyte esterase. Wednesday morning blood pressure was checked at home and was 200/91, they called office and were instructed to go to ED. Patient denies history of hypertension and is not taking any medications. In ED blood pressure responded to amlodipine and IV hydralazine. Per daughter in law patient was going to be discharged however she was noted to sway towards the right side when walking. CT and MRI head were negative for ischemic stroke. Blood pressure remains elevated but improved. UA was obtained in ED which was negative.       Procedure(s) (LRB):  HEMIARTHROPLASTY, HIP (Left)      Hospital Course:   Patient complained of L hip pain on day after admission, hip X ray was obtained which showed a fracture. Ortho was consulted and took patient to OR for ORIF. At first it was difficult to control pain and patient was unable to work with PT. Patient also presented with orthostatic hypotension during this hospital stay that improved after giving her IV  fluids and discontinuing amlodipine. Pain control improved after increasing dose of Percocet. Patient is able to ambulate with walker and PT assistance now. Patient is stable for discharge to SNF. Will be transferred today.         Goals of Care Treatment Preferences:  Code Status: DNR    Vitals:    01/18/23 0543 01/18/23 0743 01/18/23 1140 01/18/23 1211   BP:  133/76 111/71    Pulse:  75 95    Resp: 18   18   Temp:  98.1 °F (36.7 °C) 98.2 °F (36.8 °C)    TempSrc:  Oral Oral    SpO2:  96% 96%    Weight:       Height:         Physical Exam  Constitutional:       General: She is not in acute distress.     Appearance: Normal appearance.   HENT:      Head: Normocephalic and atraumatic.   Eyes:      Extraocular Movements: Extraocular movements intact.      Pupils: Pupils are equal, round, and reactive to light.   Cardiovascular:      Rate and Rhythm: Normal rate and regular rhythm.      Pulses: Normal pulses.      Heart sounds: Normal heart sounds. No murmur heard.    No friction rub. No gallop.   Pulmonary:      Effort: Pulmonary effort is normal.      Breath sounds: Normal breath sounds. No wheezing, rhonchi or rales.   Abdominal:      General: Abdomen is flat. Bowel sounds are normal. There is no distension.      Palpations: Abdomen is soft.      Tenderness: There is no abdominal tenderness.   Musculoskeletal:         General: No swelling or tenderness. Normal range of motion.      Cervical back: Normal range of motion and neck supple. No tenderness.      Right lower leg: No edema.      Left lower leg: No edema.   Lymphadenopathy:      Cervical: No cervical adenopathy.   Skin:     Findings: No lesion or rash.   Neurological:      General: No focal deficit present.      Mental Status: She is alert.      Cranial Nerves: No cranial nerve deficit.      Sensory: No sensory deficit.      Motor: No weakness.           Consults:   Consults (From admission, onward)        Status Ordering Provider     Inpatient consult to  Social Work/Case Management  Once        Provider:  (Not yet assigned)    Completed ELEANOR JJ     Inpatient consult to Social Work/Case Management  Once        Provider:  (Not yet assigned)    Completed YUSEF RUSH     Inpatient consult to Orthopedics  Once        Provider:  Rodrick Andrew DO    Completed ELEANOR JJ     IP consult to case management  Once        Provider:  (Not yet assigned)    Completed ELEANOR JJ          * Closed left hip fracture  Status post ORIF   Continue PT  Will be transferred to SNF today    Orthostatic hypotension  Improving with above medication changes       Constipation  X ray abdomen with no signs of bowel obstruction  Patient is having bowel movements now         Anemia  H&H stable, Hb of 8.1 01/17  Continue to monitor       Ataxic gait  Continue current therapy   Will be transferred to SNF today     Hypertension  Blood pressures are somewhat labile.  Improved since holding amlodipine and starting clonidine at night  Continue current medication  Seroquel twice a day could contribute to hypotension. Family refuses to stop this as this is only thing that is controlling her behavioral issues with her dementia.    Rheumatoid arthritis  Continue home medications       Fibromyalgia  Continue home medications       Early onset Alzheimer's disease with behavioral disturbance  Continue home medications         Final Active Diagnoses:    Diagnosis Date Noted POA    PRINCIPAL PROBLEM:  Closed left hip fracture [S72.002A] 01/06/2023 No    Orthostatic hypotension [I95.1] 01/12/2023 No    Constipation [K59.00] 01/11/2023 No    Anemia [D64.9] 01/09/2023 Yes    Hypertension [I10] 01/05/2023 Yes    Ataxic gait [R26.0] 01/05/2023 Yes    Fibromyalgia [M79.7] 05/16/2022 Yes    Rheumatoid arthritis [M06.9] 05/16/2022 Yes    Early onset Alzheimer's disease with behavioral disturbance [G30.0, F02.818] 03/29/2021 Yes      Problems Resolved During this  Admission:       Discharged Condition: stable    Disposition: Skilled Nursing Facility    Follow Up:   Follow-up Information     Cindy Almaguer MD Follow up on 2/1/2023.    Specialty: Internal Medicine  Why: at 2:40 pm  Contact information:  [place] [address]  [phone]             Rodrick Andrew DO Follow up on 1/31/2023.    Specialty: Orthopedic Surgery  Why: at 9:30 am  Contact information:  [address]  [address]  [place] [address]  [phone]                       Patient Instructions:   No discharge procedures on file.    Significant Diagnostic Studies: Labs:   CBC   Recent Labs   Lab 01/17/23  1640   WBC 9.3   HGB 8.1*   HCT 26.4*   *       Pending Diagnostic Studies:     Procedure Component Value Units Date/Time    Occult Blood, Stool, Diagnostic (1-3) [[id number]]     Order Status: Sent Lab Status: No result     Specimen: Stool     Narrative:      The following orders were created for panel order Occult Blood, Stool, Diagnostic (1-3).  Procedure                               Abnormality         Status                     ---------                               -----------         ------                     Occult blood x 3, stool[[id number]]                                                       Please view results for these tests on the individual orders.    Occult blood x 3, stool [[id number]]     Order Status: Sent Lab Status: No result     Specimen: Stool          Medications:  Reconciled Home Medications:      Medication List      START taking these medications    bisacodyL 10 mg Supp  Commonly known as: DULCOLAX  Place 1 suppository (10 mg total) rectally daily as needed (constipation).     cloNIDine 0.1 MG tablet  Commonly known as: CATAPRES  Take 1 tablet (0.1 mg total) by mouth nightly as needed (>170/110).     melatonin 3 mg tablet  Commonly known as: MELATIN  Take 2 tablets (6 mg total) by mouth nightly as needed for Insomnia.     methocarbamoL 750 MG  Tab  Commonly known as: ROBAXIN  Take 1 tablet (750 mg total) by mouth 3 (three) times daily. for 10 days     ondansetron 8 MG Tbdl  Commonly known as: ZOFRAN-ODT  Take 1 tablet (8 mg total) by mouth every 8 (eight) hours as needed (nausea).     oxyCODONE-acetaminophen  mg per tablet  Commonly known as: PERCOCET  Take 1 tablet by mouth every 4 (four) hours as needed for Pain.        CONTINUE taking these medications    donepeziL 10 MG tablet  Commonly known as: ARICEPT  Take 1 tablet (10 mg total) by mouth once daily.     DULoxetine 60 MG capsule  Commonly known as: CYMBALTA  Take 1 capsule (60 mg total) by mouth 2 (two) times daily.     leflunomide 20 MG Tab  Commonly known as: ARAVA  Take 1 tablet (20 mg total) by mouth once daily.     memantine 10 MG Tab  Commonly known as: NAMENDA  TAKE 1 TABLET TWICE DAILY     omeprazole 40 MG capsule  Commonly known as: PRILOSEC  Take 1 capsule (40 mg total) by mouth once daily.     predniSONE 5 MG tablet  Commonly known as: DELTASONE  Take 5 mg by mouth once daily.     QUEtiapine 25 MG Tab  Commonly known as: SEROQUEL  Take one tablet twice daily. Okay to take an extra half to one tablet daily if needed for increased anxiety, irritability or insomnia.        STOP taking these medications    cyclobenzaprine 10 MG tablet  Commonly known as: FLEXERIL     diclofenac 50 MG EC tablet  Commonly known as: VOLTAREN     hydrOXYchloroQUINE 200 mg tablet  Commonly known as: PLAQUENIL     pregabalin 25 MG capsule  Commonly known as: LYRICA            Indwelling Lines/Drains at time of discharge:   Lines/Drains/Airways     Drain  Duration           Female External Urinary Catheter 01/07/23 1830 10 days                Time spent on the discharge of patient: > 30 minutes         Cindy Almaguer MD  Department of Hospital Medicine  [facility] - 4th Floor Medical Telemetry

## 2023-01-18 NOTE — HOSPITAL COURSE
Patient complained of L hip pain on day after admission, hip X ray was obtained which showed a fracture. Ortho was consulted and took patient to OR for ORIF. At first it was difficult to control pain and patient was unable to work with PT. Patient also presented with orthostatic hypotension during this hospital stay that improved after giving her IV fluids and discontinuing amlodipine. Pain control improved after increasing dose of Percocet. Patient is able to ambulate with walker and PT assistance now. Patient is stable for discharge to SNF. Will be transferred today.

## 2023-01-18 NOTE — PLAN OF CARE
01/18/23 0953   Final Note   Assessment Type Final Discharge Note   Anticipated Discharge Disposition SNF  ([facility])   Hospital Resources/Appts/Education Provided Appointments scheduled and added to AVS   Post-Acute Status   Post-Acute Authorization Placement   Post-Acute Placement Status Set-up Complete/Auth obtained     Patient has been accepted to [facility]. Discharge information sent via [facility] and spoke to [facility]. They will provide transport.

## 2023-01-18 NOTE — ASSESSMENT & PLAN NOTE
Blood pressures are somewhat labile.  Improved since holding amlodipine and starting clonidine at night  Continue current medication  Seroquel twice a day could contribute to hypotension. Family refuses to stop this as this is only thing that is controlling her behavioral issues with her dementia.

## 2023-01-18 NOTE — PT/OT/SLP PROGRESS
Occupational Therapy   Treatment    Name: Gisele Meraz  MRN: [id number]  Admitting Diagnosis: ataxic gait, HTN, RA, fibromyalgia, early onset Alzheimer's disease with behavioral disturbance  Recent Surgery: Procedure(s) (LRB):  HEMIARTHROPLASTY, HIP (Left) 11 Days Post-Op    Recommendations:     Discharge Recommendations: nursing facility, skilled (Note: Pt has been accepted to [facility] per CM's note)  Discharge Equipment Recommendations: dressing devices, shower chair  Barriers to discharge: none    Assessment:     Gisele Meraz is a 67 y.o. female with a medical diagnosis of ataxic gait, HTN, RA, fibromyalgia, early onset Alzheimer's disease with behavioral disturbance. She presents with c/o pain in L hip and dizziness during session. Performance deficits affecting function are weakness, gait instability, impaired endurance, impaired balance, pain, impaired cognition, impaired self care skills, impaired functional mobility, decreased safety awareness, decreased lower extremity function.     Rehab Prognosis: Good; patient would benefit from acute skilled OT services to address these deficits and reach maximum level of function.       Plan:     Patient to be seen 6 x/week to address the above listed problems via self-care/home management, therapeutic activities, therapeutic exercises  Plan of Care Expires: 01/30/23  Plan of Care Reviewed with: patient (DIL)    Subjective     Pain/Comfort:  Pt c/o pain in L hip during session.  Pt also c/o dizziness with activity. Pt's BP checked throughout.    Objective:     Communicated with: RN prior to session. Patient found seated on toilet with telemetry upon OT entry to room.    General Precautions: Standard, fall, L hip pxns (no internal or external rotation x6 weeks otherwise ROMAT with abduction pillow while in bed per MD)  Orthopedic Precautions: LLE WBAT  Braces: Hip abduction pillow while in bed  Respiratory Status: RA  Vitals:  O2: 95%  FL: 97, 123  (post-activity)  BP: 111/71 and 121/78 (after pt c/o dizziness)     Occupational Performance:     Functional Mobility/Transfers:    Pt performed toilet t/f with min A provided, using GB and RW.  Pt ambulated from toilet > chair at bedside using RW with vcs and min A provided to properly manage RW.  Pt performed sit to stands from chair (with foam cushion) with vcs and min A progressing to CGA provided, using RW.    Activities of Daily Living:  Toileting: Pt performed teri-care with mod A provided, requiring assist to wipe buttocks to be sure area was clean. Pt was not wearing LB clothing item during task.  UB dressing: Pt donned bra and pullover shirt with SBA and vcs provided while seated in chair.  LB Dressing: Pt doffed/donned B socks with SBA and vcs provided, using dressing stick and sock-aid. Pt donned underwear and elastic pants with mod A and vcs provided, using reacher. Pt required assist to thread LLE into pants and to don underwear and pants over buttocks.  Feeding: Setup A provided.  Note: Pt required rest breaks throughout ADLs.    Education:  OT provided re-education on situation and that pt is not allowed to cross LLE at this time in order to adhere to L hip pxns.     Patient left UIC with foam cushion, all lines intact, chair alarm on, call button in reach, RN notified, and pt's DIL present.    GOALS:   Multidisciplinary Problems       Occupational Therapy Goals          Problem: Occupational Therapy    Goal Priority Disciplines Outcome Interventions   Occupational Therapy Goal     OT, PT/OT Ongoing, Progressing    Description: Goals to be met by: 1/23/23     Patient will increase functional independence with ADLs by performing:    UE Dressing with Stand-by Assistance.  LE Dressing with Minimal Assistance and Assistive Devices as needed.  Toileting from toilet with Minimal Assistance for hygiene and clothing management. - goal upgrade from bedside commode to toilet  Toilet transfer to toilet with  Stand-by Assistance. - New goal  Grooming standing at sink with Stand-by Assistance. - New goal                         Time Tracking:     OT Date of Treatment: 1/18/23  OT Start Time: 1115  OT Stop Time: 1205  OT Total Time (min): 50 mins    Billable Minutes: Self Care/Home Management 50 mins    OT/VICTORIA: OT     VICTORIA Visit Number: 2    1/18/2023

## 2023-01-18 NOTE — PROGRESS NOTES
[facility] - 4th Floor [facility] Medicine  Progress Note    Patient Name: Gisele Meraz  MRN: [id number]  Patient Class: IP- Inpatient   Admission Date: 1/4/2023  Length of Stay: 13 days  Attending Physician: Cindy Smith MD  Primary Care Provider: Cindy Almaguer MD        Subjective:     Principal Problem:<principal problem not specified>        HPI:  Ms Jaquez is a 68 y/o female patient with past medical history significant for Alzheimer disease, fibromyalgia, rheumatoid arthritis who presented to ED with complaint of elevated blood pressure. Most of history is obtained from daughter in law who is primary caregiver. She states patient started feeling dizzy Tuesday night, they called office and labs were done which showed elevated BUN and creatinine, urine dipstick was obtained at home which was positive for leukocyte esterase. Wednesday morning blood pressure was checked at home and was 200/91, they called office and were instructed to go to ED. Patient denies history of hypertension and is not taking any medications. In ED blood pressure responded to amlodipine and IV hydralazine. Per daughter in law patient was going to be discharged however she was noted to sway towards the right side when walking. CT and MRI head were negative for ischemic stroke. Blood pressure remains elevated but improved. UA was obtained in ED which was negative.       Overview/Hospital Course:  No notes on file    Interval History: Patient is doing well, no events over the weekend. Pain is well controlled, blood pressure has improved. Patient has been accepted to [facility], will be transferred there tomorrow morning.     Review of Systems   Constitutional:  Negative for chills, fatigue and fever.   HENT:  Negative for congestion, rhinorrhea and sore throat.    Respiratory:  Negative for cough, chest tightness and shortness of breath.    Cardiovascular:  Negative for chest pain,  palpitations and leg swelling.   Gastrointestinal:  Negative for abdominal distention, abdominal pain, constipation, diarrhea, nausea and vomiting.   Genitourinary:  Negative for dysuria.   Musculoskeletal:  Negative for arthralgias, back pain and joint swelling.   Neurological:  Negative for dizziness and headaches.   Psychiatric/Behavioral:  Negative for agitation and confusion.    Objective:     Vital Signs (Most Recent):  Temp: 98.3 °F (36.8 °C) (01/17/23 1534)  Pulse: 86 (01/17/23 1534)  Resp: 20 (01/17/23 1736)  BP: 136/77 (01/17/23 1534)  SpO2: (!) 92 % (01/17/23 1534)   Vital Signs (24h Range):  Temp:  [97.9 °F (36.6 °C)-98.4 °F (36.9 °C)] 98.3 °F (36.8 °C)  Pulse:  [84-95] 86  Resp:  [18-20] 20  SpO2:  [92 %-97 %] 92 %  BP: (136-166)/(62-91) 136/77     Weight: 74.8 kg (165 lb)  Body mass index is 28.32 kg/m².    Intake/Output Summary (Last 24 hours) at 1/17/2023 1900  Last data filed at 1/17/2023 1743  Gross per 24 hour   Intake 840 ml   Output 900 ml   Net -60 ml      Physical Exam  Constitutional:       General: She is not in acute distress.     Appearance: Normal appearance.   HENT:      Head: Normocephalic and atraumatic.   Eyes:      Extraocular Movements: Extraocular movements intact.      Pupils: Pupils are equal, round, and reactive to light.   Cardiovascular:      Rate and Rhythm: Normal rate and regular rhythm.      Pulses: Normal pulses.      Heart sounds: Normal heart sounds. No murmur heard.    No friction rub. No gallop.   Pulmonary:      Effort: Pulmonary effort is normal.      Breath sounds: Normal breath sounds. No wheezing, rhonchi or rales.   Abdominal:      General: Abdomen is flat. Bowel sounds are normal. There is no distension.      Palpations: Abdomen is soft.      Tenderness: There is no abdominal tenderness.   Musculoskeletal:         General: No swelling or tenderness. Normal range of motion.      Cervical back: Normal range of motion and neck supple. No tenderness.      Right  lower leg: No edema.      Left lower leg: No edema.   Lymphadenopathy:      Cervical: No cervical adenopathy.   Skin:     Findings: No lesion or rash.   Neurological:      General: No focal deficit present.      Mental Status: She is alert.      Cranial Nerves: No cranial nerve deficit.      Sensory: No sensory deficit.      Motor: No weakness.       Significant Labs: All pertinent labs within the past 24 hours have been reviewed.    Significant Imaging: I have reviewed all pertinent imaging results/findings within the past 24 hours.      Assessment/Plan:      Orthostatic hypotension  Improving with above medication changes       Constipation  X ray abdomen with no signs of bowel obstruction  Patient is having bowel movements now         Anemia  H&H stable, Hb of 8.1 today  Continue to monitor       Closed left hip fracture  Status post ORIF   Continue PT  Will be transferred to SNF in the morning     Ataxic gait  Continue current therapy   Will be transferred to SNF in the morning       Hypertension  Blood pressures are somewhat labile.  Improved since holding amlodipine and starting clonidine at night  Continue current medication  Seroquel twice a day could contribute to hypotension. Family refuses to stop this as this is only thing that is controlling her behavioral issues with her dementia.    Rheumatoid arthritis  Continue home medications       Fibromyalgia  Continue home medications       Early onset Alzheimer's disease with behavioral disturbance  A little more confused from baseline, likely related to hospitalization and opioids  Confusion has improved some since decreasing frequency of Percocet  Continue home medications         VTE Risk Mitigation (From admission, onward)         Ordered     enoxaparin injection 40 mg  Daily         01/07/23 1446     IP VTE HIGH RISK PATIENT  Once         01/04/23 2155     Place sequential compression device  Until discontinued         01/04/23 2155                 Discharge Planning   DANIEL:      Code Status: DNR   Is the patient medically ready for discharge?:     Reason for patient still in hospital (select all that apply): Treatment  Discharge Plan A: Rehab                  Cindy Almaguer MD  Department of [facility] Medicine   [facility] - 4th Floor Medical Telemetry

## 2023-01-19 ENCOUNTER — TELEPHONE (OUTPATIENT)
Dept: INTERNAL MEDICINE | Facility: CLINIC | Age: 68
End: 2023-01-19
Payer: MEDICARE

## 2023-01-19 DIAGNOSIS — S72.002A CLOSED FRACTURE OF LEFT HIP, INITIAL ENCOUNTER: Primary | ICD-10-CM

## 2023-01-19 DIAGNOSIS — R26.0 ATAXIC GAIT: ICD-10-CM

## 2023-01-19 DIAGNOSIS — F02.818 EARLY ONSET ALZHEIMER'S DISEASE WITH BEHAVIORAL DISTURBANCE: ICD-10-CM

## 2023-01-19 DIAGNOSIS — G30.0 EARLY ONSET ALZHEIMER'S DISEASE WITH BEHAVIORAL DISTURBANCE: ICD-10-CM

## 2023-01-19 RX ORDER — OXYCODONE AND ACETAMINOPHEN 10; 325 MG/1; MG/1
1 TABLET ORAL EVERY 4 HOURS PRN
Qty: 30 TABLET | Refills: 0 | Status: SHIPPED | OUTPATIENT
Start: 2023-01-19 | End: 2023-02-01 | Stop reason: ALTCHOICE

## 2023-01-19 NOTE — TELEPHONE ENCOUNTER
Patients caregiver Sweta needs a rehab and dementia facility immediately, patient was discharged to [facility] and they were completely unable to care for a dementia pt. Please place orders for a different facility that can manage patients care.

## 2023-01-24 ENCOUNTER — TELEPHONE (OUTPATIENT)
Dept: INTERNAL MEDICINE | Facility: CLINIC | Age: 68
End: 2023-01-24
Payer: MEDICARE

## 2023-01-24 NOTE — TELEPHONE ENCOUNTER
----- Message from Kasey Haynes LPN sent at 1/24/2023  8:43 AM CST -----  Regarding: FW: Add Home Health Aide    ----- Message -----  From: Darin Giang  Sent: 1/23/2023   8:37 AM CST  To: Kasey Haynes LPN  Subject: Add Home Health Aide                             Good morning Nika Parks with Palliative Medicine called in regards to     Gisele Meraz 1955    Patient currently approved for Home Health through [facility].  Celso would like orders to add          Home Health Aide as well.           Thanks,     Darin

## 2023-01-31 ENCOUNTER — TELEPHONE (OUTPATIENT)
Dept: INTERNAL MEDICINE | Facility: CLINIC | Age: 68
End: 2023-01-31
Payer: MEDICARE

## 2023-01-31 ENCOUNTER — HOSPITAL ENCOUNTER (OUTPATIENT)
Dept: RADIOLOGY | Facility: CLINIC | Age: 68
Discharge: HOME | End: 2023-01-31
Attending: ORTHOPAEDIC SURGERY
Payer: MEDICARE

## 2023-01-31 ENCOUNTER — OFFICE VISIT (OUTPATIENT)
Dept: ORTHOPEDICS | Facility: CLINIC | Age: 68
End: 2023-01-31
Payer: MEDICARE

## 2023-01-31 VITALS
RESPIRATION RATE: 18 BRPM | WEIGHT: 164.88 LBS | TEMPERATURE: 98 F | HEIGHT: 64 IN | SYSTOLIC BLOOD PRESSURE: 124 MMHG | BODY MASS INDEX: 28.15 KG/M2 | HEART RATE: 82 BPM | DIASTOLIC BLOOD PRESSURE: 82 MMHG

## 2023-01-31 DIAGNOSIS — S72.002A CLOSED DISPLACED FRACTURE OF LEFT FEMORAL NECK: Primary | ICD-10-CM

## 2023-01-31 DIAGNOSIS — S72.002A CLOSED DISPLACED FRACTURE OF LEFT FEMORAL NECK: ICD-10-CM

## 2023-01-31 PROCEDURE — 99024 POSTOP FOLLOW-UP VISIT: CPT | Mod: POP,,, | Performed by: PHYSICIAN ASSISTANT

## 2023-01-31 PROCEDURE — 73502 X-RAY EXAM HIP UNI 2-3 VIEWS: CPT | Mod: LT,,, | Performed by: ORTHOPAEDIC SURGERY

## 2023-01-31 PROCEDURE — 99024 PR POST-OP FOLLOW-UP VISIT: ICD-10-PCS | Mod: POP,,, | Performed by: PHYSICIAN ASSISTANT

## 2023-01-31 PROCEDURE — 73502 XR HIP WITH PELVIS WHEN PERFORMED, 2 OR 3 VIEWS LEFT: ICD-10-PCS | Mod: LT,,, | Performed by: ORTHOPAEDIC SURGERY

## 2023-01-31 NOTE — PHYSICIAN QUERY
PT Name: Gisele Meraz  MR #: [id number]    DOCUMENTATION CLARIFICATION      CDS/: Angelique Patel RN, CCDS             Contact information: [email or web address]    This form is a permanent document in the medical record.      Query Date: January 31, 2023    By submitting this query, we are merely seeking further clarification of documentation. Please utilize your independent clinical judgment when addressing the question(s) below.    The Medical Record contains the following:   Indicators  Supporting Clinical Findings Location in Medical Record   X Anemia documented Anemia-  This is likely due to blood loss from surgery    1/9- 1/17 prog note   X H&H 10.7/32.3-->10.4/33.1-->8.9/28.9-->7.5/23.8  7.4/23.3-->8.0/25.6-->7.3/24.1-->7.6/24.6 1/6-1/17 lab      BP                    HR      GI bleeding documented      Acute bleeding (Non GI site)      Transfusion(s)     X Acute/Chronic illness Left femoral neck fracture  Left Hip Hemiarthroplasty 1/7 op note   X Treatments Hb has slightly trended down, 7.5 this morning  Will monitor daily 1/9- 1/17 prog notes    Other       Provider, please specify the type of anemia.   [ x  ] Acute blood loss anemia expected post-operatively    [   ] Other Hematological Diagnosis (please specify): _________________     Please document in your progress notes daily for the duration of treatment, until resolved, and include in your discharge summary.    Form No. [id number]

## 2023-01-31 NOTE — PROGRESS NOTES
Subjective:       Patient ID: Gisele Meraz is a 67 y.o. female.  Chief Complaint   Patient presents with    Left Hip - Post-op Evaluation     3.5 WEEK F/U LEFT HIP HEMIARTHROPLASTY, REPORTS SOME SORENESS, AMBULATES WITH WALKER.        HPI    Patient presents for 3.5 week follow up left hip hemiarthroplasty. She does have mild dementia. Daughter in law is present with her today. States they are moving Ms. Meraz into their home. They do need help with lift assistance and are requesting an order for a lift chair. I do believe this is reasonable for her at this time. She denies numbness and tingling distally. Has little to pain at the hip.     ROS:  Constitutional: Denies fever chills  Eyes: No change in vision  ENT: No ringing or current infections  CV: No chest pain  Resp: No labored breathing  MSK: Pain evident at site of injury located in HPI,   Integ: No signs of abrasions or lacerations  Neuro: No numbness or tingling  Lymphatic: No swelling outside the area of injury     Current Outpatient Medications on File Prior to Visit   Medication Sig Dispense Refill    bisacodyL (DULCOLAX) 10 mg Supp Place 1 suppository (10 mg total) rectally daily as needed (constipation). 30 suppository 0    cloNIDine (CATAPRES) 0.1 MG tablet Take 1 tablet (0.1 mg total) by mouth nightly as needed (>170/110). 30 tablet 0    donepeziL (ARICEPT) 10 MG tablet Take 1 tablet (10 mg total) by mouth once daily. 90 tablet 3    DULoxetine (CYMBALTA) 60 MG capsule Take 1 capsule (60 mg total) by mouth 2 (two) times daily. 180 capsule 3    leflunomide (ARAVA) 20 MG Tab Take 1 tablet (20 mg total) by mouth once daily. 90 tablet 3    melatonin (MELATIN) 3 mg tablet Take 2 tablets (6 mg total) by mouth nightly as needed for Insomnia. 30 tablet 0    memantine (NAMENDA) 10 MG Tab TAKE 1 TABLET TWICE DAILY 180 tablet 3    omeprazole (PRILOSEC) 40 MG capsule Take 1 capsule (40 mg total) by mouth once daily. 90 capsule 3    ondansetron (ZOFRAN-ODT) 8  "MG TbDL Take 1 tablet (8 mg total) by mouth every 8 (eight) hours as needed (nausea). 30 tablet 0    predniSONE (DELTASONE) 5 MG tablet Take 5 mg by mouth once daily.      QUEtiapine (SEROQUEL) 25 MG Tab Take one tablet twice daily. Okay to take an extra half to one tablet daily if needed for increased anxiety, irritability or insomnia. 60 tablet 3    oxyCODONE-acetaminophen (PERCOCET)  mg per tablet Take 1 tablet by mouth every 4 (four) hours as needed for Pain. (Patient not taking: Reported on 1/31/2023) 30 tablet 0     No current facility-administered medications on file prior to visit.          Objective:      /82   Pulse 82   Temp 98 °F (36.7 °C) (Oral)   Resp 18   Ht 5' 4" (1.626 m)   Wt 74.8 kg (164 lb 14.5 oz)   BMI 28.31 kg/m²   Physical Exam  General the patient is alert and oriented x3 no acute distress nontoxic-appearing appropriate affect.    Constitutional: Vital signs are examined and stable.  Resp: No signs of labored breathing    Musculoskeletal:     Left lower extremity: no swelling; no deformity; incisions are well healed without erythema, drainage, signs of dehiscence; compartments are soft and compressible; No pain with ROM at the hip, knee, or ankle; no tenderness to palpation; dorsi/plantar flexes the foot; SILT distally; BCR distally; DP pulse 2+      Body mass index is 28.31 kg/m².  Ideal body weight: 54.7 kg (120 lb 9.5 oz)  Adjusted ideal body weight: 62.7 kg (138 lb 5.1 oz)  No results found for: HGBA1C  Hgb   Date Value Ref Range Status   01/17/2023 8.1 (L) 12.0 - 16.0 gm/dL Final   01/16/2023 7.6 (L) 12.0 - 16.0 gm/dL Final     Hct   Date Value Ref Range Status   01/17/2023 26.4 (L) 37.0 - 47.0 % Final   01/16/2023 24.6 (L) 37.0 - 47.0 % Final     Iron Level   Date Value Ref Range Status   03/27/2022 35 50 - 170      No components found for: FROLATE  Vit D 25 OH   Date Value Ref Range Status   02/10/2022 14.4 30.0 - 80.0      WBC   Date Value Ref Range Status "   01/17/2023 9.3 4.5 - 11.5 x10(3)/mcL Final   01/16/2023 7.1 4.5 - 11.5 x10(3)/mcL Final       Radiology: 3 view x ray left hip: hardware intact without signs of loosening or failure. Length and alignment stable as compared to post operative images.        Assessment:         1. Closed displaced fracture of left femoral neck  X-Ray Hip 2 or 3 views Left (with Pelvis when performed)    HME - OTHER              Plan:         Follow up in about 6 weeks (around 3/14/2023), or if symptoms worsen or fail to improve.    Gisele was seen today for post-op evaluation.    Diagnoses and all orders for this visit:    Closed displaced fracture of left femoral neck  -     X-Ray Hip 2 or 3 views Left (with Pelvis when performed); Future  -     HME - OTHER        -Continue WBAT and ROMAT  - Patient will benefit from lift chair as her family are her primary caregivers. She was unable to stay In SNF facility due to dementia and combative behavior.   - Improving in her status overall since surgery. We will follow up with her in 6 weeks for repeat x-rays and evaluation.  -ED precautions given    The above findings, diagnostics, and treatment plan were discussed with Dr. Andrew who is in agreement with the plan of care except as stated in additional documentation.       Harriet Rudd PA-C          Future Appointments   Date Time Provider Department Center   2/1/2023  2:40 PM Cindy Smith MD [id number]   3/14/2023  9:30 AM Rodrick Andrew,  [facility] [place]   4/27/2023  1:30 PM [facility]

## 2023-01-31 NOTE — PHYSICIAN QUERY
PT Name: Gisele Meraz  MR #: [id number]     DOCUMENTATION CLARIFICATION     CDS/: Angelique Patel RN, CCDS             Contact information: [email or web address]  This form is a permanent document in the medical record.     Query Date: January 31, 2023    By submitting this query, we are merely seeking further clarification of documentation to reflect the severity of illness of your patient. Please utilize your independent clinical judgment when addressing the question(s) below.    The Medical Record contains the following:   Indicators   Supporting Clinical Findings Location in Medical Record   X Hypertension or hypertensive documented  C/o dizziness and hypertension onset last night, last BP was 200/95 this morning.  presents to the ED with intermittent episodes of HTN onset last night with dizziness this morning. Normotensive in triage    Hypertensive urgency    Hypertension, No PMH of HTN    1/4 ed note              1/5 h/p     X Acute/Chronic condition(s) Ataxic gait, fibromyalgia, rheumatoid arthritis   Early onset Alzheimer's disease with behavioral disturbance   1/5 h/p   X Vital signs  170/90, 176/88, 204/88, 191/102, 222/106, 172/91 1/4 vs    Lab findings      Radiology findings     X Treatment/Medication In ED blood pressure responded to amlodipine and IV hydralazine  No PMH of HTN, not taking any medications  Will start amlodipine 5 mg QD since blood pressure remains above goal  Continue hydralazine PRN for BP > 180/100 1/5 h/p     Other       The clinical guidelines noted are only a system guideline. It does not replace the provider's clinical judgment.  Provider, please clarify the type of Hypertension.  [   ] Essential (primary) hypertension   [  x ] Hypertensive urgency - Severe asymptomatic hypertension (SBP>180 and/or DBP>120mmHg) without signs or symptoms of acute end-organ damage. Can have a mild headache.   [   ] Other cardiovascular condition (please specify): __________       Please  document in your progress notes daily for the duration of treatment until resolved, and include in your discharge summary.    References:    TRACY Mir MD, PhD, & BRITTON Chand MD, FACP, FCCP, FCC, FR. (2020, June 25). Evaluation and treatment of hypertensive emergencies in adults (JAIRON Buckley MD, TRACY Beck MD, & BRITTON Morel MD, MSc, Eds.). Retrieved October 22, 2020, from [email or web address]/contents/evaluation-and-treatment-vo-tfosrvvutkbc-pjejzkisveb-in-adults?search=hypertensive%20emergency&source=search_result&selectedTitle=1~150&usage_type=default&display_rank=1    BRITTON Chand MD, FACP, FCCP, [facility], FR, & TRACY Mir MD, PhD. (2020, October 14). Management of severe asymptomatic hypertension (hypertensive urgencies) in adults (JAIRON Buckley MD, TRACY Beck MD, & BRITTON Morel MD, MSc, Eds.). Retrieved October 22, 2020, from https://www.[employer]/contents/management-of-severe-asymptomatic-hypertension-hypertensive-urgencies-in-adults?search=hypertensive%20urgency&source=search_result&selectedTitle=1~41&usage_type=default&display_rank=1    Form No. [id number]

## 2023-02-01 ENCOUNTER — OFFICE VISIT (OUTPATIENT)
Dept: INTERNAL MEDICINE | Facility: CLINIC | Age: 68
End: 2023-02-01
Payer: MEDICARE

## 2023-02-01 VITALS
TEMPERATURE: 99 F | HEART RATE: 88 BPM | BODY MASS INDEX: 26.98 KG/M2 | SYSTOLIC BLOOD PRESSURE: 110 MMHG | HEIGHT: 64 IN | OXYGEN SATURATION: 97 % | DIASTOLIC BLOOD PRESSURE: 68 MMHG | WEIGHT: 158 LBS

## 2023-02-01 DIAGNOSIS — G30.0 EARLY ONSET ALZHEIMER'S DISEASE WITH BEHAVIORAL DISTURBANCE: ICD-10-CM

## 2023-02-01 DIAGNOSIS — F02.818 EARLY ONSET ALZHEIMER'S DISEASE WITH BEHAVIORAL DISTURBANCE: ICD-10-CM

## 2023-02-01 DIAGNOSIS — M06.9 RHEUMATOID ARTHRITIS, INVOLVING UNSPECIFIED SITE, UNSPECIFIED WHETHER RHEUMATOID FACTOR PRESENT: ICD-10-CM

## 2023-02-01 DIAGNOSIS — I10 HYPERTENSION, UNSPECIFIED TYPE: ICD-10-CM

## 2023-02-01 DIAGNOSIS — S72.002D CLOSED FRACTURE OF LEFT HIP WITH ROUTINE HEALING, SUBSEQUENT ENCOUNTER: ICD-10-CM

## 2023-02-01 DIAGNOSIS — Z09 HOSPITAL DISCHARGE FOLLOW-UP: ICD-10-CM

## 2023-02-01 PROBLEM — F03.918 DEMENTIA WITH BEHAVIORAL DISTURBANCE: Status: ACTIVE | Noted: 2023-02-01

## 2023-02-01 PROBLEM — D84.821 DRUG-INDUCED IMMUNODEFICIENCY: Status: ACTIVE | Noted: 2023-02-01

## 2023-02-01 PROBLEM — Z79.899 DRUG-INDUCED IMMUNODEFICIENCY: Status: ACTIVE | Noted: 2023-02-01

## 2023-02-01 PROCEDURE — 99214 OFFICE O/P EST MOD 30 MIN: CPT | Mod: ,,, | Performed by: STUDENT IN AN ORGANIZED HEALTH CARE EDUCATION/TRAINING PROGRAM

## 2023-02-01 PROCEDURE — 99214 PR OFFICE/OUTPT VISIT, EST, LEVL IV, 30-39 MIN: ICD-10-PCS | Mod: ,,, | Performed by: STUDENT IN AN ORGANIZED HEALTH CARE EDUCATION/TRAINING PROGRAM

## 2023-02-01 RX ORDER — METHOCARBAMOL 500 MG/1
500 TABLET, FILM COATED ORAL 4 TIMES DAILY
COMMUNITY
End: 2023-04-27 | Stop reason: SDUPTHER

## 2023-02-02 NOTE — ASSESSMENT & PLAN NOTE
Sustained after fall that occurred in ER at time of admission   S/p ORIF  Recovering well, continue PT at home

## 2023-02-02 NOTE — PROGRESS NOTES
Subjective:      Gisele Meraz  2023  [id number]      Chief Complaint: Hospital Follow Up (2 wks/ Left hip fx)       HPI:  Ms Jaquez presents for hospital discharge follow up. [place] [facility] [place] due to hypertension and L hip fracture. Patient has been doing well at home, has home health set up with PT. Will be living with her son and daughter in law. No complaints today. Blood pressure is well controlled, not currently taking any medications.     Past Medical History:   Diagnosis Date    Acid reflux     Arthritis     Dementia     Patient on Memantine BID    Fibromyalgia     Pneumonia     RA (rheumatoid arthritis)      Past Surgical History:   Procedure Laterality Date    ADENOIDECTOMY       SECTION      EYE SURGERY      HEMIARTHROPLASTY OF HIP Left 2023    Procedure: HEMIARTHROPLASTY, HIP;  Surgeon: Rodrick Andrew DO;  Location: [facility];  Service: Orthopedics;  Laterality: Left;    HYSTERECTOMY      TONSILLECTOMY       Family History   Problem Relation Age of Onset    Dementia Father     Dementia Maternal Grandmother     Dementia Maternal Grandfather      Social History     Tobacco Use    Smoking status: Former     Types: Cigarettes    Smokeless tobacco: Never   Substance and Sexual Activity    Alcohol use: Not Currently    Drug use: Never    Sexual activity: Not Currently     Partners: Male     Review of patient's allergies indicates:   Allergen Reactions    Penicillin Hives    Penicillins Swelling       The following were reviewed at this visit: active problem list, medication list, allergies, family history, social history, and health maintenance.    Medications:    Current Outpatient Medications:     bisacodyL (DULCOLAX) 10 mg Supp, Place 1 suppository (10 mg total) rectally daily as needed (constipation)., Disp: 30 suppository, Rfl: 0    cloNIDine (CATAPRES) 0.1 MG tablet, Take 1 tablet (0.1 mg total) by mouth nightly as needed (>170/110)., Disp: 30  tablet, Rfl: 0    donepeziL (ARICEPT) 10 MG tablet, Take 1 tablet (10 mg total) by mouth once daily., Disp: 90 tablet, Rfl: 3    DULoxetine (CYMBALTA) 60 MG capsule, Take 1 capsule (60 mg total) by mouth 2 (two) times daily., Disp: 180 capsule, Rfl: 3    leflunomide (ARAVA) 20 MG Tab, Take 1 tablet (20 mg total) by mouth once daily., Disp: 90 tablet, Rfl: 3    melatonin (MELATIN) 3 mg tablet, Take 2 tablets (6 mg total) by mouth nightly as needed for Insomnia., Disp: 30 tablet, Rfl: 0    memantine (NAMENDA) 10 MG Tab, TAKE 1 TABLET TWICE DAILY, Disp: 180 tablet, Rfl: 3    methocarbamoL (ROBAXIN) 500 MG Tab, Take 500 mg by mouth 4 (four) times daily., Disp: , Rfl:     omeprazole (PRILOSEC) 40 MG capsule, Take 1 capsule (40 mg total) by mouth once daily., Disp: 90 capsule, Rfl: 3    ondansetron (ZOFRAN-ODT) 8 MG TbDL, Take 1 tablet (8 mg total) by mouth every 8 (eight) hours as needed (nausea)., Disp: 30 tablet, Rfl: 0    predniSONE (DELTASONE) 5 MG tablet, Take 5 mg by mouth once daily., Disp: , Rfl:     QUEtiapine (SEROQUEL) 25 MG Tab, Take one tablet twice daily. Okay to take an extra half to one tablet daily if needed for increased anxiety, irritability or insomnia., Disp: 60 tablet, Rfl: 3      Medications have been reviewed and reconciled with patient at this visit.  Barriers to medications reviewed with patient.    Adverse reactions to current medications reviewed with patient..    Over the counter medications reviewed and reconciled with patient.  Review of Systems   Constitutional:  Negative for chills, fever, malaise/fatigue and weight loss.   HENT:  Negative for congestion, ear discharge, ear pain, hearing loss, sinus pain and sore throat.    Eyes:  Negative for photophobia, pain, discharge and redness.   Respiratory:  Negative for cough, shortness of breath and wheezing.    Cardiovascular:  Negative for chest pain, palpitations and leg swelling.   Gastrointestinal:  Negative for constipation, diarrhea,  "heartburn, nausea and vomiting.   Genitourinary:  Negative for dysuria, frequency and urgency.   Musculoskeletal:  Negative for falls, joint pain and myalgias.   Skin:  Negative for itching and rash.   Neurological:  Negative for dizziness, focal weakness, weakness and headaches.   Psychiatric/Behavioral:  Negative for depression and memory loss. The patient is not nervous/anxious and does not have insomnia.          Objective:      Vitals:    02/01/23 1453   BP: 110/68   Pulse: 88   Temp: 98.8 °F (37.1 °C)   TempSrc: Temporal   SpO2: 97%   Weight: 71.7 kg (158 lb)   Height: 5' 4" (1.626 m)       Physical Exam  Constitutional:       General: She is not in acute distress.     Appearance: Normal appearance.   HENT:      Head: Normocephalic and atraumatic.   Eyes:      Extraocular Movements: Extraocular movements intact.      Pupils: Pupils are equal, round, and reactive to light.   Cardiovascular:      Rate and Rhythm: Normal rate and regular rhythm.      Pulses: Normal pulses.      Heart sounds: Normal heart sounds. No murmur heard.    No friction rub. No gallop.   Pulmonary:      Effort: Pulmonary effort is normal.      Breath sounds: Normal breath sounds. No wheezing, rhonchi or rales.   Abdominal:      General: Abdomen is flat. Bowel sounds are normal. There is no distension.      Palpations: Abdomen is soft.      Tenderness: There is no abdominal tenderness.   Musculoskeletal:         General: No swelling or tenderness. Normal range of motion.      Cervical back: Normal range of motion and neck supple. No tenderness.      Right lower leg: No edema.      Left lower leg: No edema.   Lymphadenopathy:      Cervical: No cervical adenopathy.   Skin:     Findings: No lesion or rash.   Neurological:      General: No focal deficit present.      Mental Status: She is alert and oriented to person, place, and time.      Cranial Nerves: No cranial nerve deficit.      Sensory: No sensory deficit.      Motor: No weakness. "   Psychiatric:         Mood and Affect: Mood normal.         Behavior: Behavior normal.         Thought Content: Thought content normal.             Assessment and Plan:       1. Closed fracture of left hip with routine healing, subsequent encounter  Assessment & Plan:  Sustained after fall that occurred in ER at time of admission   S/p ORIF  Recovering well, continue PT at home       2. Hypertension, unspecified type  Assessment & Plan:  Resolved  Blood pressure is normal today  Will hold antihypertensives for now        3. Early onset Alzheimer's disease with behavioral disturbance  Assessment & Plan:  Continue current medications       4. Rheumatoid arthritis, involving unspecified site, unspecified whether rheumatoid factor present  Assessment & Plan:  Follows with Rheumatology  Continue current medications       5. Hospital discharge follow-up            Follow up: Follow up in about 6 months (around 8/1/2023) for wellness, Labs check.

## 2023-03-14 ENCOUNTER — HOSPITAL ENCOUNTER (OUTPATIENT)
Dept: RADIOLOGY | Facility: CLINIC | Age: 68
Discharge: HOME | End: 2023-03-14
Attending: ORTHOPAEDIC SURGERY
Payer: MEDICARE

## 2023-03-14 ENCOUNTER — OFFICE VISIT (OUTPATIENT)
Dept: ORTHOPEDICS | Facility: CLINIC | Age: 68
End: 2023-03-14
Payer: MEDICARE

## 2023-03-14 VITALS
RESPIRATION RATE: 18 BRPM | TEMPERATURE: 97 F | HEART RATE: 76 BPM | WEIGHT: 158.06 LBS | DIASTOLIC BLOOD PRESSURE: 84 MMHG | HEIGHT: 64 IN | BODY MASS INDEX: 26.98 KG/M2 | SYSTOLIC BLOOD PRESSURE: 131 MMHG

## 2023-03-14 DIAGNOSIS — S72.002A CLOSED DISPLACED FRACTURE OF LEFT FEMORAL NECK: Primary | ICD-10-CM

## 2023-03-14 DIAGNOSIS — S72.002A CLOSED DISPLACED FRACTURE OF LEFT FEMORAL NECK: ICD-10-CM

## 2023-03-14 PROCEDURE — 73502 XR HIP WITH PELVIS WHEN PERFORMED, 2 OR 3 VIEWS LEFT: ICD-10-PCS | Mod: LT,,, | Performed by: ORTHOPAEDIC SURGERY

## 2023-03-14 PROCEDURE — 99024 POSTOP FOLLOW-UP VISIT: CPT | Mod: POP,,, | Performed by: ORTHOPAEDIC SURGERY

## 2023-03-14 PROCEDURE — 99024 PR POST-OP FOLLOW-UP VISIT: ICD-10-PCS | Mod: POP,,, | Performed by: ORTHOPAEDIC SURGERY

## 2023-03-14 PROCEDURE — 73502 X-RAY EXAM HIP UNI 2-3 VIEWS: CPT | Mod: LT,,, | Performed by: ORTHOPAEDIC SURGERY

## 2023-03-14 NOTE — PROGRESS NOTES
Subjective:       Patient ID: Gisele Meraz is a 67 y.o. female.  Chief Complaint   Patient presents with    Left Hip - Follow-up     9.5 WEEK F/U LEFT HIP HEMIARTHROPLASTY, AMBULATES WITH WALKER, REPORTS WEAKNESS TO LEFT LEG.       HPI:  Patient is 10 weeks out from a left hip hemiarthroplasty currently home ambulating with a walker.  Has severe dementia daughter is here.  Some pain but it is inconsistent.  Doing well    ROS:  Constitutional: Denies fever chills  Eyes: No change in vision  ENT: No ringing or current infections  CV: No chest pain  Resp: No labored breathing  MSK: Pain evident at site of injury located in HPI,   Integ: No signs of abrasions or lacerations  Neuro: No numbness or tingling  Lymphatic: No swelling outside the area of injury     Current Outpatient Medications on File Prior to Visit   Medication Sig Dispense Refill    bisacodyL (DULCOLAX) 10 mg Supp Place 1 suppository (10 mg total) rectally daily as needed (constipation). 30 suppository 0    cloNIDine (CATAPRES) 0.1 MG tablet Take 1 tablet (0.1 mg total) by mouth nightly as needed (>170/110). 30 tablet 0    donepeziL (ARICEPT) 10 MG tablet Take 1 tablet (10 mg total) by mouth once daily. 90 tablet 3    DULoxetine (CYMBALTA) 60 MG capsule Take 1 capsule (60 mg total) by mouth 2 (two) times daily. 180 capsule 3    leflunomide (ARAVA) 20 MG Tab Take 1 tablet (20 mg total) by mouth once daily. 90 tablet 3    melatonin (MELATIN) 3 mg tablet Take 2 tablets (6 mg total) by mouth nightly as needed for Insomnia. 30 tablet 0    memantine (NAMENDA) 10 MG Tab TAKE 1 TABLET TWICE DAILY 180 tablet 3    methocarbamoL (ROBAXIN) 500 MG Tab Take 500 mg by mouth 4 (four) times daily.      omeprazole (PRILOSEC) 40 MG capsule Take 1 capsule (40 mg total) by mouth once daily. 90 capsule 3    ondansetron (ZOFRAN-ODT) 8 MG TbDL Take 1 tablet (8 mg total) by mouth every 8 (eight) hours as needed (nausea). 30 tablet 0    predniSONE (DELTASONE) 5 MG tablet Take 5  "mg by mouth once daily.      QUEtiapine (SEROQUEL) 25 MG Tab Take one tablet twice daily. Okay to take an extra half to one tablet daily if needed for increased anxiety, irritability or insomnia. 60 tablet 3     No current facility-administered medications on file prior to visit.          Objective:      /84   Pulse 76   Temp 97.1 °F (36.2 °C) (Oral)   Resp 18   Ht 5' 4" (1.626 m)   Wt 71.7 kg (158 lb 1.1 oz)   BMI 27.13 kg/m²   General the patient is alert and oriented x3 no acute distress nontoxic-appearing appropriate affect.    Constitutional: Vital signs are examined and stable.  Resp: No signs of labored breathing                 LLE: -Skin:  No signs of new abrasions or lacerations, no scars           -MSK: Hip and Knee F/E, EHL/FHL, Gastroc/Tib anterior Strength 5/5           -Neuro:  Sensation intact to light touch L3-S1 dermatomes           -Lymphatic: No signs of lymphadenopathy           -CV: Capillary refill is less than 2 seconds. DP/PT pulses 2/4. Compartments soft and compressible    Body mass index is 27.13 kg/m².  Ideal body weight: 54.7 kg (120 lb 9.5 oz)  Adjusted ideal body weight: 61.5 kg (135 lb 9.3 oz)  No results found for: HGBA1C  Hgb   Date Value Ref Range Status   01/17/2023 8.1 (L) 12.0 - 16.0 gm/dL Final   01/16/2023 7.6 (L) 12.0 - 16.0 gm/dL Final     Vit D 25 OH   Date Value Ref Range Status   02/10/2022 14.4 30.0 - 80.0      WBC   Date Value Ref Range Status   01/17/2023 9.3 4.5 - 11.5 x10(3)/mcL Final   01/16/2023 7.1 4.5 - 11.5 x10(3)/mcL Final       Radiology:  Three views left hip skeletally mature individual show intact hardware no signs of displacement or settling        Assessment:         1. Closed displaced fracture of left femoral neck  X-Ray Hip 2 or 3 views Left (with Pelvis when performed)              Plan:         No follow-ups on file.    Gisele was seen today for follow-up.    Diagnoses and all orders for this visit:    Closed displaced fracture of left " femoral neck  -     X-Ray Hip 2 or 3 views Left (with Pelvis when performed); Future    Patient has a left hip hemiarthroplasty and severe dementia versus 67.  Here with her daughter.  States she is walking with a walker currently at home with home health.  Daughter states they had significant problems at rehab as well in the hospital.  She is ambulating currently in a wheelchair today due to the long walk into the office.  Off the pain medication.  We will continue to evaluate follow-up 2 months          This note/OR report was created with the assistance of  voice recognition software or phone  dictation.  There may be transcription errors as a result of using this technology however minimal. Effort has been made to assure accuracy of transcription but any obvious errors or omissions should be clarified with the author of the document.     Rodrick Andrew DO  Orthopedic Trauma Surgery  03/14/2023      Future Appointments   Date Time Provider Department Center   4/27/2023  1:30 PM Monroe Bhandari MD [facility]   5/17/2023  9:15 AM Rodrick Andrew DO [facility] [place]   8/1/2023 10:00 AM Cindy Smith MD [id number]

## 2023-03-18 ENCOUNTER — LAB REQUISITION (OUTPATIENT)
Dept: LAB | Facility: HOSPITAL | Age: 68
End: 2023-03-18
Payer: MEDICARE

## 2023-03-18 DIAGNOSIS — N39.0 URINARY TRACT INFECTION, SITE NOT SPECIFIED: ICD-10-CM

## 2023-03-18 LAB
APPEARANCE UR: CLEAR
BACTERIA #/AREA URNS AUTO: ABNORMAL /HPF
BILIRUB UR QL STRIP.AUTO: NEGATIVE MG/DL
COLOR UR AUTO: YELLOW
GLUCOSE UR QL STRIP.AUTO: NEGATIVE MG/DL
KETONES UR QL STRIP.AUTO: NEGATIVE MG/DL
LEUKOCYTE ESTERASE UR QL STRIP.AUTO: ABNORMAL UNIT/L
NITRITE UR QL STRIP.AUTO: NEGATIVE
PH UR STRIP.AUTO: 6 [PH]
PROT UR QL STRIP.AUTO: NEGATIVE MG/DL
RBC #/AREA URNS AUTO: <5 /HPF
RBC UR QL AUTO: NEGATIVE UNIT/L
SP GR UR STRIP.AUTO: 1.01 (ref 1–1.03)
SQUAMOUS #/AREA URNS AUTO: <5 /HPF
UROBILINOGEN UR STRIP-ACNC: 0.2 MG/DL
WBC #/AREA URNS AUTO: <5 /HPF

## 2023-03-18 PROCEDURE — 87088 URINE BACTERIA CULTURE: CPT | Performed by: STUDENT IN AN ORGANIZED HEALTH CARE EDUCATION/TRAINING PROGRAM

## 2023-03-18 PROCEDURE — 81001 URINALYSIS AUTO W/SCOPE: CPT | Performed by: STUDENT IN AN ORGANIZED HEALTH CARE EDUCATION/TRAINING PROGRAM

## 2023-03-18 PROCEDURE — 87077 CULTURE AEROBIC IDENTIFY: CPT | Performed by: STUDENT IN AN ORGANIZED HEALTH CARE EDUCATION/TRAINING PROGRAM

## 2023-03-20 LAB — BACTERIA UR CULT: ABNORMAL

## 2023-04-18 RX ORDER — NITROFURANTOIN 25; 75 MG/1; MG/1
100 CAPSULE ORAL 2 TIMES DAILY
Qty: 14 CAPSULE | Refills: 0 | Status: SHIPPED | OUTPATIENT
Start: 2023-04-18 | End: 2023-04-20 | Stop reason: SDUPTHER

## 2023-04-18 NOTE — PROGRESS NOTES
Received UA results indicating UTI, will send nitrofurantoin 100 mg BID for 7 days. Patient's caregiver was informed

## 2023-04-20 DIAGNOSIS — N39.0 URINARY TRACT INFECTION AFTER IMMOBILITY: Primary | ICD-10-CM

## 2023-04-20 DIAGNOSIS — R26.9 URINARY TRACT INFECTION AFTER IMMOBILITY: Primary | ICD-10-CM

## 2023-04-20 RX ORDER — NITROFURANTOIN 25; 75 MG/1; MG/1
100 CAPSULE ORAL 2 TIMES DAILY
Qty: 14 CAPSULE | Refills: 0 | Status: SHIPPED | OUTPATIENT
Start: 2023-04-20 | End: 2023-04-27

## 2023-04-27 ENCOUNTER — OFFICE VISIT (OUTPATIENT)
Dept: RHEUMATOLOGY | Facility: CLINIC | Age: 68
End: 2023-04-27
Payer: MEDICARE

## 2023-04-27 VITALS
DIASTOLIC BLOOD PRESSURE: 87 MMHG | SYSTOLIC BLOOD PRESSURE: 137 MMHG | TEMPERATURE: 98 F | BODY MASS INDEX: 26.53 KG/M2 | WEIGHT: 155.38 LBS | HEART RATE: 78 BPM | OXYGEN SATURATION: 95 % | HEIGHT: 64 IN

## 2023-04-27 DIAGNOSIS — E55.9 VITAMIN D DEFICIENCY: ICD-10-CM

## 2023-04-27 DIAGNOSIS — F51.01 PRIMARY INSOMNIA: ICD-10-CM

## 2023-04-27 DIAGNOSIS — G30.0 EARLY ONSET ALZHEIMER'S DISEASE WITH BEHAVIORAL DISTURBANCE: ICD-10-CM

## 2023-04-27 DIAGNOSIS — M79.7 FIBROMYALGIA: ICD-10-CM

## 2023-04-27 DIAGNOSIS — M06.9 RHEUMATOID ARTHRITIS, INVOLVING UNSPECIFIED SITE, UNSPECIFIED WHETHER RHEUMATOID FACTOR PRESENT: Primary | ICD-10-CM

## 2023-04-27 DIAGNOSIS — F39 MOOD DISORDER: ICD-10-CM

## 2023-04-27 DIAGNOSIS — F02.818 EARLY ONSET ALZHEIMER'S DISEASE WITH BEHAVIORAL DISTURBANCE: ICD-10-CM

## 2023-04-27 DIAGNOSIS — M05.9 SEROPOSITIVE RHEUMATOID ARTHRITIS: ICD-10-CM

## 2023-04-27 DIAGNOSIS — E28.310 SYMPTOMATIC PREMATURE MENOPAUSE: ICD-10-CM

## 2023-04-27 DIAGNOSIS — K59.00 CONSTIPATION, UNSPECIFIED CONSTIPATION TYPE: ICD-10-CM

## 2023-04-27 PROCEDURE — 99999 PR PBB SHADOW E&M-EST. PATIENT-LVL IV: CPT | Mod: PBBFAC,,, | Performed by: INTERNAL MEDICINE

## 2023-04-27 PROCEDURE — 99214 PR OFFICE/OUTPT VISIT, EST, LEVL IV, 30-39 MIN: ICD-10-PCS | Mod: S$PBB,,, | Performed by: INTERNAL MEDICINE

## 2023-04-27 PROCEDURE — 99214 OFFICE O/P EST MOD 30 MIN: CPT | Mod: PBBFAC | Performed by: INTERNAL MEDICINE

## 2023-04-27 PROCEDURE — 99999 PR PBB SHADOW E&M-EST. PATIENT-LVL IV: ICD-10-PCS | Mod: PBBFAC,,, | Performed by: INTERNAL MEDICINE

## 2023-04-27 PROCEDURE — 99214 OFFICE O/P EST MOD 30 MIN: CPT | Mod: S$PBB,,, | Performed by: INTERNAL MEDICINE

## 2023-04-27 RX ORDER — OMEPRAZOLE 40 MG/1
40 CAPSULE, DELAYED RELEASE ORAL DAILY
Qty: 90 CAPSULE | Refills: 3 | Status: SHIPPED | OUTPATIENT
Start: 2023-04-27

## 2023-04-27 RX ORDER — PREDNISONE 5 MG/1
5 TABLET ORAL DAILY PRN
Qty: 90 TABLET | Refills: 3 | Status: SHIPPED | OUTPATIENT
Start: 2023-04-27

## 2023-04-27 RX ORDER — METHOCARBAMOL 500 MG/1
1000 TABLET, FILM COATED ORAL NIGHTLY
Qty: 180 TABLET | Refills: 3 | Status: SHIPPED | OUTPATIENT
Start: 2023-04-27

## 2023-04-27 RX ORDER — LEFLUNOMIDE 20 MG/1
20 TABLET ORAL DAILY
Qty: 90 TABLET | Refills: 3 | Status: SHIPPED | OUTPATIENT
Start: 2023-04-27 | End: 2024-04-26

## 2023-04-27 RX ORDER — PREGABALIN 25 MG/1
25 CAPSULE ORAL 2 TIMES DAILY
Qty: 180 CAPSULE | Refills: 3 | Status: SHIPPED | OUTPATIENT
Start: 2023-04-27 | End: 2023-10-26

## 2023-04-27 NOTE — PROGRESS NOTES
"Subjective:       Patient ID: Gisele Meraz is a 67 y.o. female.    Chief Complaint: Follow-up (Follow up. Patient complains of pain in both hands. Pain 2/10)    The patient is complaining of joint pain involving the MCP PIP wrist elbow shoulders hips knees and ankles bilaterally.  The pain is 1/10 in intensity dull in quality and continuous.  That is associated with a morning stiffness lasting for  less  than 60 minutes.  Is also having difficulty maintaining a good night of sleep.  This has been associated with myalgias.  Muscle aches are 1/10 in intensity dull in quality and continuous.  They are associated with fatigue.  No fever no chills no others.  Left hips fract, s/p repair. I will order BMD    Review of Systems   Constitutional:  Negative for appetite change, chills and fever.   HENT:  Negative for congestion, ear pain, mouth sores, nosebleeds and trouble swallowing.    Eyes:  Negative for photophobia and discharge.   Respiratory:  Negative for chest tightness and shortness of breath.    Cardiovascular:  Negative for chest pain.   Gastrointestinal:  Negative for abdominal pain and vomiting.   Endocrine: Negative.    Genitourinary:  Negative for hematuria.   Musculoskeletal:         As per HPI   Skin:  Negative for rash.   Neurological:  Negative for weakness.       Objective:   /87 (BP Location: Left arm, Patient Position: Sitting, BP Method: Medium (Automatic))   Pulse 78   Temp 98.2 °F (36.8 °C) (Oral)   Ht 5' 4" (1.626 m)   Wt 70.5 kg (155 lb 6.4 oz)   SpO2 95%   BMI 26.67 kg/m²      Physical Exam   Constitutional: She is oriented to person, place, and time. She appears well-developed and well-nourished. No distress.   HENT:   Head: Normocephalic and atraumatic.   Right Ear: External ear normal.   Left Ear: External ear normal.   Eyes: Pupils are equal, round, and reactive to light.   Cardiovascular: Normal rate, regular rhythm and normal heart sounds.   Pulmonary/Chest: Breath sounds " normal.   Abdominal: Soft. There is no abdominal tenderness.   Musculoskeletal:      Right shoulder: Normal.      Left shoulder: Normal.      Right elbow: Normal.      Left elbow: Normal.      Right wrist: Normal.      Left wrist: Normal.      Cervical back: Neck supple.      Right hip: Normal.      Left hip: Normal.      Right knee: Normal.      Left knee: Normal.   Lymphadenopathy:     She has no cervical adenopathy.   Neurological: She is alert and oriented to person, place, and time. She displays normal reflexes. No cranial nerve deficit or sensory deficit. She exhibits normal muscle tone. Coordination normal.   Skin: No rash noted. No erythema.   Vitals reviewed.      Right Side Rheumatological Exam     Examination finds the shoulder, elbow, wrist, knee, 1st PIP, 1st MCP, 2nd PIP, 2nd MCP, 3rd PIP, 3rd MCP, 4th PIP, 4th MCP, 5th PIP, 5th MCP, temporomandibular, hip, ankle, 1st MTP, 2nd MTP, 3rd MTP, 4th MTP and 5th MTP normal.    Left Side Rheumatological Exam     Examination finds the shoulder, elbow, wrist, knee, 1st PIP, 1st MCP, 2nd PIP, 2nd MCP, 3rd PIP, 3rd MCP, 4th PIP, 4th MCP, 5th PIP, 5th MCP, temporomandibular, hip, ankle, 1st MTP, 2nd MTP, 3rd MTP, 4th MTP and 5th MTP normal.       Completed Fibromyalgia exam 11/18 tender points.  No data to display     Assessment:       1. Rheumatoid arthritis, involving unspecified site, unspecified whether rheumatoid factor present    2. Vitamin D deficiency    3. Constipation, unspecified constipation type    4. Fibromyalgia    5. Primary insomnia    6. Early onset Alzheimer's disease with behavioral disturbance    7. Seropositive rheumatoid arthritis    8. Mood disorder    9. Symptomatic premature menopause          Medication List with Changes/Refills   New Medications    PREGABALIN (LYRICA) 25 MG CAPSULE    Take 1 capsule (25 mg total) by mouth 2 (two) times daily.       Start Date: 4/27/2023 End Date: 10/26/2023   Current Medications    DONEPEZIL (ARICEPT)  10 MG TABLET    Take 1 tablet (10 mg total) by mouth once daily.       Start Date: 1/18/2023 End Date: --    DULOXETINE (CYMBALTA) 60 MG CAPSULE    Take 1 capsule (60 mg total) by mouth 2 (two) times daily.       Start Date: 8/18/2022 End Date: --    MELATONIN (MELATIN) 3 MG TABLET    Take 2 tablets (6 mg total) by mouth nightly as needed for Insomnia.       Start Date: 1/18/2023 End Date: --    MEMANTINE (NAMENDA) 10 MG TAB    TAKE 1 TABLET TWICE DAILY       Start Date: 1/17/2023 End Date: --    NITROFURANTOIN, MACROCRYSTAL-MONOHYDRATE, (MACROBID) 100 MG CAPSULE    Take 1 capsule (100 mg total) by mouth 2 (two) times daily. for 7 days       Start Date: 4/20/2023 End Date: 4/27/2023    ONDANSETRON (ZOFRAN-ODT) 8 MG TBDL    Take 1 tablet (8 mg total) by mouth every 8 (eight) hours as needed (nausea).       Start Date: 1/18/2023 End Date: --    QUETIAPINE (SEROQUEL) 25 MG TAB    Take one tablet twice daily. Okay to take an extra half to one tablet daily if needed for increased anxiety, irritability or insomnia.       Start Date: 1/18/2023 End Date: --   Changed and/or Refilled Medications    Modified Medication Previous Medication    LEFLUNOMIDE (ARAVA) 20 MG TAB leflunomide (ARAVA) 20 MG Tab       Take 1 tablet (20 mg total) by mouth once daily.    Take 1 tablet (20 mg total) by mouth once daily.       Start Date: 4/27/2023 End Date: 4/26/2024    Start Date: 8/18/2022 End Date: 4/27/2023    METHOCARBAMOL (ROBAXIN) 500 MG TAB methocarbamoL (ROBAXIN) 500 MG Tab       Take 2 tablets (1,000 mg total) by mouth nightly.    Take 500 mg by mouth 4 (four) times daily.       Start Date: 4/27/2023 End Date: --    Start Date: --        End Date: 4/27/2023    OMEPRAZOLE (PRILOSEC) 40 MG CAPSULE omeprazole (PRILOSEC) 40 MG capsule       Take 1 capsule (40 mg total) by mouth once daily.    Take 1 capsule (40 mg total) by mouth once daily.       Start Date: 4/27/2023 End Date: --    Start Date: 8/18/2022 End Date: 4/27/2023     PREDNISONE (DELTASONE) 5 MG TABLET predniSONE (DELTASONE) 5 MG tablet       Take 1 tablet (5 mg total) by mouth daily as needed (flare ups).    Take 5 mg by mouth once daily.       Start Date: 4/27/2023 End Date: --    Start Date: --        End Date: 4/27/2023   Discontinued Medications    BISACODYL (DULCOLAX) 10 MG SUPP    Place 1 suppository (10 mg total) rectally daily as needed (constipation).       Start Date: 1/18/2023 End Date: 4/27/2023    CLONIDINE (CATAPRES) 0.1 MG TABLET    Take 1 tablet (0.1 mg total) by mouth nightly as needed (>170/110).       Start Date: 1/18/2023 End Date: 4/27/2023         Plan:         Problem List Items Addressed This Visit          Neuro    Early onset Alzheimer's disease with behavioral disturbance    Relevant Medications    methocarbamoL (ROBAXIN) 500 MG Tab    omeprazole (PRILOSEC) 40 MG capsule    predniSONE (DELTASONE) 5 MG tablet    leflunomide (ARAVA) 20 MG Tab    pregabalin (LYRICA) 25 MG capsule    Other Relevant Orders    DXA Bone Density Axial Skeleton 1 or more sites       Psychiatric    Mood disorder    Relevant Medications    methocarbamoL (ROBAXIN) 500 MG Tab    omeprazole (PRILOSEC) 40 MG capsule    predniSONE (DELTASONE) 5 MG tablet    leflunomide (ARAVA) 20 MG Tab    pregabalin (LYRICA) 25 MG capsule    Other Relevant Orders    DXA Bone Density Axial Skeleton 1 or more sites       Immunology/Multi System    Rheumatoid arthritis - Primary    Relevant Medications    methocarbamoL (ROBAXIN) 500 MG Tab    omeprazole (PRILOSEC) 40 MG capsule    predniSONE (DELTASONE) 5 MG tablet    leflunomide (ARAVA) 20 MG Tab    pregabalin (LYRICA) 25 MG capsule    Other Relevant Orders    DXA Bone Density Axial Skeleton 1 or more sites       Endocrine    Vitamin D deficiency    Relevant Medications    methocarbamoL (ROBAXIN) 500 MG Tab    omeprazole (PRILOSEC) 40 MG capsule    predniSONE (DELTASONE) 5 MG tablet    leflunomide (ARAVA) 20 MG Tab    pregabalin (LYRICA) 25 MG capsule     Other Relevant Orders    DXA Bone Density Axial Skeleton 1 or more sites       GI    Constipation    Relevant Medications    methocarbamoL (ROBAXIN) 500 MG Tab    omeprazole (PRILOSEC) 40 MG capsule    predniSONE (DELTASONE) 5 MG tablet    leflunomide (ARAVA) 20 MG Tab    pregabalin (LYRICA) 25 MG capsule    Other Relevant Orders    DXA Bone Density Axial Skeleton 1 or more sites       Orthopedic    Fibromyalgia    Relevant Medications    methocarbamoL (ROBAXIN) 500 MG Tab    omeprazole (PRILOSEC) 40 MG capsule    predniSONE (DELTASONE) 5 MG tablet    leflunomide (ARAVA) 20 MG Tab    pregabalin (LYRICA) 25 MG capsule    Other Relevant Orders    DXA Bone Density Axial Skeleton 1 or more sites       Other    Insomnia    Relevant Medications    methocarbamoL (ROBAXIN) 500 MG Tab    omeprazole (PRILOSEC) 40 MG capsule    predniSONE (DELTASONE) 5 MG tablet    leflunomide (ARAVA) 20 MG Tab    pregabalin (LYRICA) 25 MG capsule    Other Relevant Orders    DXA Bone Density Axial Skeleton 1 or more sites     Other Visit Diagnoses       Seropositive rheumatoid arthritis        Relevant Medications    methocarbamoL (ROBAXIN) 500 MG Tab    omeprazole (PRILOSEC) 40 MG capsule    predniSONE (DELTASONE) 5 MG tablet    leflunomide (ARAVA) 20 MG Tab    pregabalin (LYRICA) 25 MG capsule    Other Relevant Orders    DXA Bone Density Axial Skeleton 1 or more sites    Symptomatic premature menopause        Relevant Orders    DXA Bone Density Axial Skeleton 1 or more sites

## 2023-05-04 DIAGNOSIS — Z12.31 OTHER SCREENING MAMMOGRAM: Primary | ICD-10-CM

## 2023-05-05 ENCOUNTER — HOSPITAL ENCOUNTER (OUTPATIENT)
Dept: RADIOLOGY | Facility: HOSPITAL | Age: 68
Discharge: HOME | End: 2023-05-05
Attending: INTERNAL MEDICINE
Payer: MEDICARE

## 2023-05-05 DIAGNOSIS — F39 MOOD DISORDER: ICD-10-CM

## 2023-05-05 DIAGNOSIS — K59.00 CONSTIPATION, UNSPECIFIED CONSTIPATION TYPE: ICD-10-CM

## 2023-05-05 DIAGNOSIS — E28.310 SYMPTOMATIC PREMATURE MENOPAUSE: ICD-10-CM

## 2023-05-05 DIAGNOSIS — F02.818 EARLY ONSET ALZHEIMER'S DISEASE WITH BEHAVIORAL DISTURBANCE: ICD-10-CM

## 2023-05-05 DIAGNOSIS — M06.9 RHEUMATOID ARTHRITIS, INVOLVING UNSPECIFIED SITE, UNSPECIFIED WHETHER RHEUMATOID FACTOR PRESENT: ICD-10-CM

## 2023-05-05 DIAGNOSIS — G30.0 EARLY ONSET ALZHEIMER'S DISEASE WITH BEHAVIORAL DISTURBANCE: ICD-10-CM

## 2023-05-05 DIAGNOSIS — F51.01 PRIMARY INSOMNIA: ICD-10-CM

## 2023-05-05 DIAGNOSIS — M05.9 SEROPOSITIVE RHEUMATOID ARTHRITIS: ICD-10-CM

## 2023-05-05 DIAGNOSIS — E55.9 VITAMIN D DEFICIENCY: ICD-10-CM

## 2023-05-05 DIAGNOSIS — M79.7 FIBROMYALGIA: ICD-10-CM

## 2023-05-05 PROCEDURE — 77080 DXA BONE DENSITY AXIAL: CPT | Mod: TC

## 2023-05-05 PROCEDURE — 77080 DXA BONE DENSITY AXIAL SKELETON 1 OR MORE SITES: ICD-10-PCS | Mod: 26,,, | Performed by: RADIOLOGY

## 2023-05-05 PROCEDURE — 77080 DXA BONE DENSITY AXIAL: CPT | Mod: 26,,, | Performed by: RADIOLOGY

## 2023-05-08 PROBLEM — Z09 HOSPITAL DISCHARGE FOLLOW-UP: Status: RESOLVED | Noted: 2023-02-01 | Resolved: 2023-05-08

## 2023-05-09 ENCOUNTER — LAB REQUISITION (OUTPATIENT)
Dept: LAB | Facility: HOSPITAL | Age: 68
End: 2023-05-09
Payer: MEDICARE

## 2023-05-09 DIAGNOSIS — R42 DIZZINESS AND GIDDINESS: ICD-10-CM

## 2023-05-09 DIAGNOSIS — R35.0 FREQUENCY OF MICTURITION: ICD-10-CM

## 2023-05-09 DIAGNOSIS — R41.0 DISORIENTATION, UNSPECIFIED: ICD-10-CM

## 2023-05-09 LAB
APPEARANCE UR: CLEAR
BACTERIA #/AREA URNS AUTO: ABNORMAL /HPF
BILIRUB UR QL STRIP.AUTO: NEGATIVE MG/DL
COLOR UR AUTO: YELLOW
GLUCOSE UR QL STRIP.AUTO: NEGATIVE MG/DL
KETONES UR QL STRIP.AUTO: NEGATIVE MG/DL
LEUKOCYTE ESTERASE UR QL STRIP.AUTO: NEGATIVE UNIT/L
NITRITE UR QL STRIP.AUTO: NEGATIVE
PH UR STRIP.AUTO: 5.5 [PH]
PROT UR QL STRIP.AUTO: ABNORMAL MG/DL
RBC #/AREA URNS AUTO: ABNORMAL /HPF
RBC UR QL AUTO: NEGATIVE UNIT/L
SP GR UR STRIP.AUTO: >=1.03
SQUAMOUS #/AREA URNS AUTO: ABNORMAL /HPF
UROBILINOGEN UR STRIP-ACNC: 0.2 MG/DL
WBC #/AREA URNS AUTO: ABNORMAL /HPF

## 2023-05-09 PROCEDURE — 87088 URINE BACTERIA CULTURE: CPT | Performed by: NURSE PRACTITIONER

## 2023-05-09 PROCEDURE — 81001 URINALYSIS AUTO W/SCOPE: CPT | Performed by: NURSE PRACTITIONER

## 2023-05-11 LAB — BACTERIA UR CULT: NORMAL

## 2023-05-17 ENCOUNTER — HOSPITAL ENCOUNTER (OUTPATIENT)
Dept: RADIOLOGY | Facility: CLINIC | Age: 68
Discharge: HOME | End: 2023-05-17
Attending: ORTHOPAEDIC SURGERY
Payer: MEDICARE

## 2023-05-17 ENCOUNTER — OFFICE VISIT (OUTPATIENT)
Dept: ORTHOPEDICS | Facility: CLINIC | Age: 68
End: 2023-05-17
Payer: MEDICARE

## 2023-05-17 VITALS
WEIGHT: 155.44 LBS | SYSTOLIC BLOOD PRESSURE: 99 MMHG | RESPIRATION RATE: 18 BRPM | BODY MASS INDEX: 26.54 KG/M2 | DIASTOLIC BLOOD PRESSURE: 70 MMHG | HEART RATE: 81 BPM | HEIGHT: 64 IN

## 2023-05-17 DIAGNOSIS — S72.002A CLOSED DISPLACED FRACTURE OF LEFT FEMORAL NECK: ICD-10-CM

## 2023-05-17 DIAGNOSIS — S72.002A CLOSED DISPLACED FRACTURE OF LEFT FEMORAL NECK: Primary | ICD-10-CM

## 2023-05-17 PROCEDURE — 99213 PR OFFICE/OUTPT VISIT, EST, LEVL III, 20-29 MIN: ICD-10-PCS | Mod: ,,, | Performed by: PHYSICIAN ASSISTANT

## 2023-05-17 PROCEDURE — 73502 X-RAY EXAM HIP UNI 2-3 VIEWS: CPT | Mod: LT,,, | Performed by: ORTHOPAEDIC SURGERY

## 2023-05-17 PROCEDURE — 73502 XR HIP WITH PELVIS WHEN PERFORMED, 2 OR 3 VIEWS LEFT: ICD-10-PCS | Mod: LT,,, | Performed by: ORTHOPAEDIC SURGERY

## 2023-05-17 PROCEDURE — 99213 OFFICE O/P EST LOW 20 MIN: CPT | Mod: ,,, | Performed by: PHYSICIAN ASSISTANT

## 2023-05-17 NOTE — PROGRESS NOTES
Subjective:       Patient ID: Gisele Meraz is a 67 y.o. female.  Chief Complaint   Patient presents with    Left Hip - Follow-up     4.5 MONTH F/U LEFT HIP NIDA, AMBULATING WITH WALKER, REPORTS SOME SORENESS WITH AMBULATION.        HPI    Patient presents for 4.5 month follow up left hip hemiarthroplasty. Ambulating with a walker which daughter reports is minimal. She is home now with home physical therapy. It seems home therapy is not doing much exercising with her. Her daughter is present with her today and states that she works from home and is likely unable to bring her to outpatient therapy multiple days per week due to the time commitment. She states some weakness in certain positions in the left leg but otherwise is doing okay.     ROS:  Constitutional: Denies fever chills  Eyes: No change in vision  ENT: No ringing or current infections  CV: No chest pain  Resp: No labored breathing  MSK: Pain evident at site of injury located in HPI,   Integ: No signs of abrasions or lacerations  Neuro: No numbness or tingling  Lymphatic: No swelling outside the area of injury     Current Outpatient Medications on File Prior to Visit   Medication Sig Dispense Refill    donepeziL (ARICEPT) 10 MG tablet Take 1 tablet (10 mg total) by mouth once daily. 90 tablet 3    DULoxetine (CYMBALTA) 60 MG capsule Take 1 capsule (60 mg total) by mouth 2 (two) times daily. 180 capsule 3    leflunomide (ARAVA) 20 MG Tab Take 1 tablet (20 mg total) by mouth once daily. 90 tablet 3    melatonin (MELATIN) 3 mg tablet Take 2 tablets (6 mg total) by mouth nightly as needed for Insomnia. 30 tablet 0    memantine (NAMENDA) 10 MG Tab TAKE 1 TABLET TWICE DAILY 180 tablet 3    methocarbamoL (ROBAXIN) 500 MG Tab Take 2 tablets (1,000 mg total) by mouth nightly. 180 tablet 3    omeprazole (PRILOSEC) 40 MG capsule Take 1 capsule (40 mg total) by mouth once daily. 90 capsule 3    ondansetron (ZOFRAN-ODT) 8 MG TbDL Take 1 tablet (8 mg total) by mouth  "every 8 (eight) hours as needed (nausea). 30 tablet 0    predniSONE (DELTASONE) 5 MG tablet Take 1 tablet (5 mg total) by mouth daily as needed (flare ups). 90 tablet 3    pregabalin (LYRICA) 25 MG capsule Take 1 capsule (25 mg total) by mouth 2 (two) times daily. 180 capsule 3    QUEtiapine (SEROQUEL) 25 MG Tab Take one tablet twice daily. Okay to take an extra half to one tablet daily if needed for increased anxiety, irritability or insomnia. 60 tablet 3     No current facility-administered medications on file prior to visit.          Objective:      BP 99/70   Pulse 81   Resp 18   Ht 5' 4" (1.626 m)   Wt 70.5 kg (155 lb 6.8 oz)   BMI 26.68 kg/m²   Physical Exam  General the patient is alert and oriented x3 no acute distress nontoxic-appearing appropriate affect.    Constitutional: Vital signs are examined and stable.  Resp: No signs of labored breathing    Musculoskeletal:     Left lower extremity: no swelling; no deformity; no open wounds; compartments are soft and compressible; Tolerates gentle passive circumduction of the hip; sitting in wheelchair; no tenderness to palpation; dorsi/plantar flexes the foot; SILT distally; BCR distally; DP pulse 2+      Body mass index is 26.68 kg/m².  Ideal body weight: 54.7 kg (120 lb 9.5 oz)  Adjusted ideal body weight: 61 kg (134 lb 8.4 oz)  No results found for: HGBA1C  Hgb   Date Value Ref Range Status   01/17/2023 8.1 (L) 12.0 - 16.0 gm/dL Final   01/16/2023 7.6 (L) 12.0 - 16.0 gm/dL Final     Hct   Date Value Ref Range Status   01/17/2023 26.4 (L) 37.0 - 47.0 % Final   01/16/2023 24.6 (L) 37.0 - 47.0 % Final     Iron Level   Date Value Ref Range Status   03/27/2022 35 50 - 170      No components found for: FROLATE  Vit D 25 OH   Date Value Ref Range Status   02/10/2022 14.4 30.0 - 80.0      WBC   Date Value Ref Range Status   01/17/2023 9.3 4.5 - 11.5 x10(3)/mcL Final   01/16/2023 7.1 4.5 - 11.5 x10(3)/mcL Final       Radiology: 3 view x ray left hip:      "   Assessment:         1. Closed displaced fracture of left femoral neck  X-Ray Hip 2 or 3 views Left (with Pelvis when performed)    Ambulatory referral/consult to Physical/Occupational Therapy              Plan:         Follow up in about 6 weeks (around 6/28/2023), or if symptoms worsen or fail to improve.    Gisele was seen today for follow-up.    Diagnoses and all orders for this visit:    Closed displaced fracture of left femoral neck  -     X-Ray Hip 2 or 3 views Left (with Pelvis when performed); Future  -     Ambulatory referral/consult to Physical/Occupational Therapy; Future        -continue full weight bearing and ROM as tolerated. Discussed outpatient therapy. Patient and family declined order today.   -implant is stable with no signs of settling. Recommend continued home PT and mobilization with walker as needed.   - Follow up in approx 3 months if needed for repeat evaluation or as needed per patient request.   -ED precautions given    The above findings, diagnostics, and treatment plan were discussed with Dr. Andrew who is in agreement with the plan of care except as stated in additional documentation.       Harriet Rudd PA-C          Future Appointments   Date Time Provider Department Center   8/1/2023 10:00 AM Cindy Smith MD [id number]   9/26/2023  9:30 AM [facility]

## 2023-06-03 ENCOUNTER — EXTERNAL HOME HEALTH (OUTPATIENT)
Dept: HOME HEALTH SERVICES | Facility: HOSPITAL | Age: 68
End: 2023-06-03
Payer: MEDICARE

## 2023-06-13 ENCOUNTER — LAB REQUISITION (OUTPATIENT)
Dept: LAB | Facility: HOSPITAL | Age: 68
End: 2023-06-13
Payer: MEDICARE

## 2023-06-13 DIAGNOSIS — R42 DIZZINESS AND GIDDINESS: ICD-10-CM

## 2023-06-13 DIAGNOSIS — R35.0 FREQUENCY OF MICTURITION: ICD-10-CM

## 2023-06-13 DIAGNOSIS — R41.0 DISORIENTATION, UNSPECIFIED: ICD-10-CM

## 2023-06-13 LAB
APPEARANCE UR: CLEAR
BACTERIA #/AREA URNS AUTO: NORMAL /HPF
BILIRUB UR QL STRIP.AUTO: NEGATIVE MG/DL
COLOR UR: YELLOW
GLUCOSE UR QL STRIP.AUTO: NEGATIVE MG/DL
KETONES UR QL STRIP.AUTO: NEGATIVE MG/DL
LEUKOCYTE ESTERASE UR QL STRIP.AUTO: ABNORMAL UNIT/L
NITRITE UR QL STRIP.AUTO: NEGATIVE
PH UR STRIP.AUTO: 6.5 [PH]
PROT UR QL STRIP.AUTO: NEGATIVE MG/DL
RBC #/AREA URNS AUTO: <5 /HPF
RBC UR QL AUTO: NEGATIVE UNIT/L
SP GR UR STRIP.AUTO: 1.02 (ref 1–1.03)
SQUAMOUS #/AREA URNS AUTO: <5 /HPF
UROBILINOGEN UR STRIP-ACNC: 0.2 MG/DL
WBC #/AREA URNS AUTO: <5 /HPF

## 2023-06-13 PROCEDURE — 81001 URINALYSIS AUTO W/SCOPE: CPT | Performed by: NURSE PRACTITIONER

## 2023-06-13 PROCEDURE — 87088 URINE BACTERIA CULTURE: CPT | Performed by: NURSE PRACTITIONER

## 2023-06-15 LAB — BACTERIA UR CULT: NO GROWTH

## 2023-06-28 RX ORDER — HYDROXYCHLOROQUINE SULFATE 200 MG/1
TABLET, FILM COATED ORAL
Qty: 180 TABLET | Refills: 3 | Status: SHIPPED | OUTPATIENT
Start: 2023-06-28

## 2023-06-29 ENCOUNTER — DOCUMENT SCAN (OUTPATIENT)
Dept: HOME HEALTH SERVICES | Facility: HOSPITAL | Age: 68
End: 2023-06-29
Payer: MEDICARE

## 2023-07-10 PROCEDURE — 87088 URINE BACTERIA CULTURE: CPT | Performed by: NURSE PRACTITIONER

## 2023-07-10 PROCEDURE — 81001 URINALYSIS AUTO W/SCOPE: CPT | Performed by: NURSE PRACTITIONER

## 2023-07-11 ENCOUNTER — LAB REQUISITION (OUTPATIENT)
Dept: LAB | Facility: HOSPITAL | Age: 68
End: 2023-07-11
Payer: MEDICARE

## 2023-07-11 DIAGNOSIS — R82.991 HYPOCITRATURIA: ICD-10-CM

## 2023-07-11 LAB
APPEARANCE UR: CLEAR
BACTERIA #/AREA URNS AUTO: ABNORMAL /HPF
BILIRUB UR QL STRIP.AUTO: NEGATIVE MG/DL
COLOR UR: YELLOW
GLUCOSE UR QL STRIP.AUTO: NEGATIVE MG/DL
KETONES UR QL STRIP.AUTO: ABNORMAL MG/DL
LEUKOCYTE ESTERASE UR QL STRIP.AUTO: ABNORMAL UNIT/L
NITRITE UR QL STRIP.AUTO: NEGATIVE
PH UR STRIP.AUTO: 5.5 [PH]
PROT UR QL STRIP.AUTO: ABNORMAL MG/DL
RBC #/AREA URNS AUTO: <5 /HPF
RBC UR QL AUTO: NEGATIVE UNIT/L
SP GR UR STRIP.AUTO: 1.01 (ref 1–1.03)
SQUAMOUS #/AREA URNS AUTO: <5 /HPF
UROBILINOGEN UR STRIP-ACNC: 0.2 MG/DL
WBC #/AREA URNS AUTO: 9 /HPF

## 2023-07-13 LAB
BACTERIA UR CULT: ABNORMAL

## 2023-07-19 ENCOUNTER — OFFICE VISIT (OUTPATIENT)
Dept: INTERNAL MEDICINE | Facility: CLINIC | Age: 68
End: 2023-07-19
Payer: MEDICARE

## 2023-07-19 ENCOUNTER — TELEPHONE (OUTPATIENT)
Dept: INTERNAL MEDICINE | Facility: CLINIC | Age: 68
End: 2023-07-19

## 2023-07-19 VITALS
HEART RATE: 88 BPM | OXYGEN SATURATION: 97 % | RESPIRATION RATE: 16 BRPM | HEIGHT: 64 IN | DIASTOLIC BLOOD PRESSURE: 74 MMHG | BODY MASS INDEX: 25.1 KG/M2 | WEIGHT: 147 LBS | SYSTOLIC BLOOD PRESSURE: 102 MMHG

## 2023-07-19 DIAGNOSIS — M06.9 RHEUMATOID ARTHRITIS, INVOLVING UNSPECIFIED SITE, UNSPECIFIED WHETHER RHEUMATOID FACTOR PRESENT: ICD-10-CM

## 2023-07-19 DIAGNOSIS — D84.821 DRUG-INDUCED IMMUNODEFICIENCY: ICD-10-CM

## 2023-07-19 DIAGNOSIS — I10 HYPERTENSION, UNSPECIFIED TYPE: ICD-10-CM

## 2023-07-19 DIAGNOSIS — Z02.2 ENCOUNTER FOR EXAMINATION FOR ADMISSION TO NURSING HOME: Primary | ICD-10-CM

## 2023-07-19 DIAGNOSIS — Z79.899 DRUG-INDUCED IMMUNODEFICIENCY: ICD-10-CM

## 2023-07-19 PROCEDURE — 99214 PR OFFICE/OUTPT VISIT, EST, LEVL IV, 30-39 MIN: ICD-10-PCS | Mod: ,,, | Performed by: STUDENT IN AN ORGANIZED HEALTH CARE EDUCATION/TRAINING PROGRAM

## 2023-07-19 PROCEDURE — 99214 OFFICE O/P EST MOD 30 MIN: CPT | Mod: ,,, | Performed by: STUDENT IN AN ORGANIZED HEALTH CARE EDUCATION/TRAINING PROGRAM

## 2023-07-19 RX ORDER — MELOXICAM 7.5 MG/1
7.5 TABLET ORAL DAILY PRN
Qty: 30 TABLET | Refills: 0 | Status: SHIPPED | OUTPATIENT
Start: 2023-07-19

## 2023-07-19 NOTE — TELEPHONE ENCOUNTER
----- Message from Cindy Smith MD sent at 7/19/2023  2:35 PM CDT -----  Regarding: Lab and X ray orders    Please fax lab and chest xray orders to [facility]. Orders are in the chart

## 2023-07-20 PROCEDURE — G0179 PR HOME HEALTH MD RECERTIFICATION: ICD-10-PCS | Mod: ,,, | Performed by: STUDENT IN AN ORGANIZED HEALTH CARE EDUCATION/TRAINING PROGRAM

## 2023-07-20 PROCEDURE — G0179 MD RECERTIFICATION HHA PT: HCPCS | Mod: ,,, | Performed by: STUDENT IN AN ORGANIZED HEALTH CARE EDUCATION/TRAINING PROGRAM

## 2023-07-21 ENCOUNTER — LAB REQUISITION (OUTPATIENT)
Dept: LAB | Facility: HOSPITAL | Age: 68
End: 2023-07-21
Attending: STUDENT IN AN ORGANIZED HEALTH CARE EDUCATION/TRAINING PROGRAM
Payer: MEDICARE

## 2023-07-21 DIAGNOSIS — G30.9 ALZHEIMER'S DISEASE, UNSPECIFIED (CODE): ICD-10-CM

## 2023-07-21 DIAGNOSIS — M06.9 RHEUMATOID ARTHRITIS, UNSPECIFIED: ICD-10-CM

## 2023-07-21 DIAGNOSIS — I10 ESSENTIAL (PRIMARY) HYPERTENSION: ICD-10-CM

## 2023-07-21 DIAGNOSIS — I95.1 ORTHOSTATIC HYPOTENSION: ICD-10-CM

## 2023-07-21 DIAGNOSIS — D64.9 ANEMIA, UNSPECIFIED: ICD-10-CM

## 2023-07-21 DIAGNOSIS — G30.0 ALZHEIMER'S DISEASE WITH EARLY ONSET (CODE): ICD-10-CM

## 2023-07-21 PROBLEM — Z02.2 ENCOUNTER FOR EXAMINATION FOR ADMISSION TO NURSING HOME: Status: ACTIVE | Noted: 2023-07-21

## 2023-07-21 LAB
ALBUMIN SERPL-MCNC: 3.4 G/DL (ref 3.4–4.8)
ALBUMIN/GLOB SERPL: 1.3 RATIO (ref 1.1–2)
ALP SERPL-CCNC: 67 UNIT/L (ref 40–150)
ALT SERPL-CCNC: 10 UNIT/L (ref 0–55)
AST SERPL-CCNC: 14 UNIT/L (ref 5–34)
BASOPHILS # BLD AUTO: 0.04 X10(3)/MCL
BASOPHILS NFR BLD AUTO: 0.8 %
BILIRUBIN DIRECT+TOT PNL SERPL-MCNC: 0.4 MG/DL
BUN SERPL-MCNC: 11.2 MG/DL (ref 9.8–20.1)
CALCIUM SERPL-MCNC: 8.9 MG/DL (ref 8.4–10.2)
CHLORIDE SERPL-SCNC: 106 MMOL/L (ref 98–107)
CHOLEST SERPL-MCNC: 185 MG/DL
CHOLEST/HDLC SERPL: 4 {RATIO} (ref 0–5)
CO2 SERPL-SCNC: 26 MMOL/L (ref 23–31)
CREAT SERPL-MCNC: 0.97 MG/DL (ref 0.55–1.02)
EOSINOPHIL # BLD AUTO: 0.06 X10(3)/MCL (ref 0–0.9)
EOSINOPHIL NFR BLD AUTO: 1.1 %
ERYTHROCYTE [DISTWIDTH] IN BLOOD BY AUTOMATED COUNT: 13.3 % (ref 11.5–17)
GFR SERPLBLD CREATININE-BSD FMLA CKD-EPI: >60 MLS/MIN/1.73/M2
GLOBULIN SER-MCNC: 2.6 GM/DL (ref 2.4–3.5)
GLUCOSE SERPL-MCNC: 83 MG/DL (ref 82–115)
HCT VFR BLD AUTO: 37.1 % (ref 37–47)
HDLC SERPL-MCNC: 51 MG/DL (ref 35–60)
HGB BLD-MCNC: 11.8 G/DL (ref 12–16)
IMM GRANULOCYTES # BLD AUTO: 0.01 X10(3)/MCL (ref 0–0.04)
IMM GRANULOCYTES NFR BLD AUTO: 0.2 %
LDLC SERPL CALC-MCNC: 111 MG/DL (ref 50–140)
LYMPHOCYTES # BLD AUTO: 1.9 X10(3)/MCL (ref 0.6–4.6)
LYMPHOCYTES NFR BLD AUTO: 36.1 %
MCH RBC QN AUTO: 29.9 PG (ref 27–31)
MCHC RBC AUTO-ENTMCNC: 31.8 G/DL (ref 33–36)
MCV RBC AUTO: 94.2 FL (ref 80–94)
MONOCYTES # BLD AUTO: 0.4 X10(3)/MCL (ref 0.1–1.3)
MONOCYTES NFR BLD AUTO: 7.6 %
NEUTROPHILS # BLD AUTO: 2.86 X10(3)/MCL (ref 2.1–9.2)
NEUTROPHILS NFR BLD AUTO: 54.2 %
NRBC BLD AUTO-RTO: 0 %
PLATELET # BLD AUTO: 175 X10(3)/MCL (ref 130–400)
PMV BLD AUTO: 12.7 FL (ref 7.4–10.4)
POTASSIUM SERPL-SCNC: 3.8 MMOL/L (ref 3.5–5.1)
PROT SERPL-MCNC: 6 GM/DL (ref 5.8–7.6)
RBC # BLD AUTO: 3.94 X10(6)/MCL (ref 4.2–5.4)
SODIUM SERPL-SCNC: 141 MMOL/L (ref 136–145)
TRIGL SERPL-MCNC: 117 MG/DL (ref 37–140)
VLDLC SERPL CALC-MCNC: 23 MG/DL
WBC # SPEC AUTO: 5.27 X10(3)/MCL (ref 4.5–11.5)

## 2023-07-21 PROCEDURE — 85025 COMPLETE CBC W/AUTO DIFF WBC: CPT | Performed by: STUDENT IN AN ORGANIZED HEALTH CARE EDUCATION/TRAINING PROGRAM

## 2023-07-21 PROCEDURE — 80053 COMPREHEN METABOLIC PANEL: CPT | Performed by: STUDENT IN AN ORGANIZED HEALTH CARE EDUCATION/TRAINING PROGRAM

## 2023-07-21 PROCEDURE — 80061 LIPID PANEL: CPT | Performed by: STUDENT IN AN ORGANIZED HEALTH CARE EDUCATION/TRAINING PROGRAM

## 2023-07-21 NOTE — PROGRESS NOTES
Subjective:      Gisele Meraz  2023  [id number]      Chief Complaint: Follow-up (Assist with living placement)       HPI:  Ms Jaquez presents for follow up. Patient comes in with daughter in law who states patient has needed closer supervision lately and have made shared decision with patient for her to be admitted into nursing home, have already initiated process to place her at [facility] and is here for evaluation.     Past Medical History:   Diagnosis Date    Acid reflux     Arthritis     Dementia     Patient on Memantine BID    Fibromyalgia     Pneumonia     RA (rheumatoid arthritis)      Past Surgical History:   Procedure Laterality Date    ADENOIDECTOMY       SECTION      EYE SURGERY      HEMIARTHROPLASTY OF HIP Left 2023    Procedure: HEMIARTHROPLASTY, HIP;  Surgeon: Rodrick Andrew DO;  Location: [facility];  Service: Orthopedics;  Laterality: Left;    HYSTERECTOMY      TONSILLECTOMY       Family History   Problem Relation Age of Onset    Dementia Father     Dementia Maternal Grandmother     Dementia Maternal Grandfather      Social History     Tobacco Use    Smoking status: Former     Types: Cigarettes    Smokeless tobacco: Never   Substance and Sexual Activity    Alcohol use: Not Currently    Drug use: Never    Sexual activity: Not Currently     Partners: Male     Review of patient's allergies indicates:   Allergen Reactions    Penicillin Hives    Penicillins Swelling       The following were reviewed at this visit: active problem list, medication list, allergies, family history, social history, and health maintenance.    Medications:    Current Outpatient Medications:     donepeziL (ARICEPT) 10 MG tablet, Take 1 tablet (10 mg total) by mouth once daily., Disp: 90 tablet, Rfl: 3    DULoxetine (CYMBALTA) 60 MG capsule, Take 1 capsule (60 mg total) by mouth 2 (two) times daily., Disp: 180 capsule, Rfl: 3    hydrOXYchloroQUINE (PLAQUENIL) 200 mg tablet, TAKE 1 TABLET TWICE DAILY, Disp: 180  tablet, Rfl: 3    leflunomide (ARAVA) 20 MG Tab, Take 1 tablet (20 mg total) by mouth once daily., Disp: 90 tablet, Rfl: 3    melatonin (MELATIN) 3 mg tablet, Take 2 tablets (6 mg total) by mouth nightly as needed for Insomnia., Disp: 30 tablet, Rfl: 0    memantine (NAMENDA) 10 MG Tab, TAKE 1 TABLET TWICE DAILY, Disp: 180 tablet, Rfl: 3    methocarbamoL (ROBAXIN) 500 MG Tab, Take 2 tablets (1,000 mg total) by mouth nightly., Disp: 180 tablet, Rfl: 3    omeprazole (PRILOSEC) 40 MG capsule, Take 1 capsule (40 mg total) by mouth once daily., Disp: 90 capsule, Rfl: 3    ondansetron (ZOFRAN-ODT) 8 MG TbDL, Take 1 tablet (8 mg total) by mouth every 8 (eight) hours as needed (nausea)., Disp: 30 tablet, Rfl: 0    predniSONE (DELTASONE) 5 MG tablet, Take 1 tablet (5 mg total) by mouth daily as needed (flare ups)., Disp: 90 tablet, Rfl: 3    pregabalin (LYRICA) 25 MG capsule, Take 1 capsule (25 mg total) by mouth 2 (two) times daily., Disp: 180 capsule, Rfl: 3    QUEtiapine (SEROQUEL) 25 MG Tab, Take one tablet twice daily. Okay to take an extra half to one tablet daily if needed for increased anxiety, irritability or insomnia., Disp: 60 tablet, Rfl: 3    meloxicam (MOBIC) 7.5 MG tablet, Take 1 tablet (7.5 mg total) by mouth daily as needed for Pain., Disp: 30 tablet, Rfl: 0      Medications have been reviewed and reconciled with patient at this visit.  Barriers to medications reviewed with patient.    Adverse reactions to current medications reviewed with patient..    Over the counter medications reviewed and reconciled with patient.  Review of Systems   Constitutional:  Negative for chills, fever, malaise/fatigue and weight loss.   HENT:  Negative for congestion, ear discharge, ear pain, hearing loss, sinus pain and sore throat.    Eyes:  Negative for photophobia, pain, discharge and redness.   Respiratory:  Negative for cough, shortness of breath and wheezing.    Cardiovascular:  Negative for chest pain, palpitations and  "leg swelling.   Gastrointestinal:  Negative for constipation, diarrhea, heartburn, nausea and vomiting.   Genitourinary:  Negative for dysuria, frequency and urgency.   Musculoskeletal:  Negative for falls, joint pain and myalgias.   Skin:  Negative for itching and rash.   Neurological:  Negative for dizziness, focal weakness, weakness and headaches.   Psychiatric/Behavioral:  Negative for depression and memory loss. The patient is not nervous/anxious and does not have insomnia.          Objective:      Vitals:    07/19/23 0947   BP: 102/74   BP Location: Left arm   Patient Position: Sitting   BP Method: Small (Manual)   Pulse: 88   Resp: 16   SpO2: 97%   Weight: 66.7 kg (147 lb)   Height: 5' 4" (1.626 m)       Physical Exam  Constitutional:       General: She is not in acute distress.     Appearance: Normal appearance.   HENT:      Head: Normocephalic and atraumatic.   Eyes:      Extraocular Movements: Extraocular movements intact.      Pupils: Pupils are equal, round, and reactive to light.   Cardiovascular:      Rate and Rhythm: Normal rate and regular rhythm.      Pulses: Normal pulses.      Heart sounds: Normal heart sounds. No murmur heard.    No friction rub. No gallop.   Pulmonary:      Effort: Pulmonary effort is normal.      Breath sounds: Normal breath sounds. No wheezing, rhonchi or rales.   Abdominal:      General: Abdomen is flat. Bowel sounds are normal. There is no distension.      Palpations: Abdomen is soft.      Tenderness: There is no abdominal tenderness.   Musculoskeletal:         General: No swelling or tenderness. Normal range of motion.      Cervical back: Normal range of motion and neck supple. No tenderness.      Right lower leg: No edema.      Left lower leg: No edema.   Lymphadenopathy:      Cervical: No cervical adenopathy.   Skin:     Findings: No lesion or rash.   Neurological:      General: No focal deficit present.      Mental Status: She is alert and oriented to person, place, and " time.      Cranial Nerves: No cranial nerve deficit.      Sensory: No sensory deficit.      Motor: No weakness.   Psychiatric:         Mood and Affect: Mood normal.         Behavior: Behavior normal.         Thought Content: Thought content normal.             Assessment and Plan:       1. Encounter for examination for admission to nursing home  Assessment & Plan:  Labs and chest X ray ordered, will fax to [facility]  Patient brings from to be filled out and faxed to [facility]     Orders:  -     X-Ray Chest PA And Lateral; Future; Expected date: 07/19/2023    2. Hypertension, unspecified type  Assessment & Plan:  Controlled  Not currently on antihypertensives     Orders:  -     CBC Auto Differential; Future; Expected date: 07/19/2023  -     Comprehensive Metabolic Panel; Future; Expected date: 07/19/2023  -     Lipid Panel; Future; Expected date: 07/19/2023    3. Rheumatoid arthritis, involving unspecified site, unspecified whether rheumatoid factor present  Assessment & Plan:  Stable on current medications  Follows with Rheumatology     Orders:  -     CBC Auto Differential; Future; Expected date: 07/19/2023    4. Drug-induced immunodeficiency  Assessment & Plan:  On hydroxychloroquine and leflunomide  Stable       Other orders  -     meloxicam (MOBIC) 7.5 MG tablet; Take 1 tablet (7.5 mg total) by mouth daily as needed for Pain.  Dispense: 30 tablet; Refill: 0            Follow up: No follow-ups on file.

## 2023-07-21 NOTE — ASSESSMENT & PLAN NOTE
Labs and chest X ray ordered, will fax to [facility]  Patient brings from to be filled out and faxed to nursing home

## 2023-07-25 ENCOUNTER — PATIENT MESSAGE (OUTPATIENT)
Dept: INTERNAL MEDICINE | Facility: CLINIC | Age: 68
End: 2023-07-25
Payer: MEDICARE

## 2023-07-25 ENCOUNTER — TELEPHONE (OUTPATIENT)
Dept: INTERNAL MEDICINE | Facility: CLINIC | Age: 68
End: 2023-07-25
Payer: MEDICARE

## 2023-07-25 ENCOUNTER — DOCUMENT SCAN (OUTPATIENT)
Dept: HOME HEALTH SERVICES | Facility: HOSPITAL | Age: 68
End: 2023-07-25
Payer: MEDICARE

## 2023-07-27 ENCOUNTER — EXTERNAL HOME HEALTH (OUTPATIENT)
Dept: HOME HEALTH SERVICES | Facility: HOSPITAL | Age: 68
End: 2023-07-27
Payer: MEDICARE

## 2023-07-28 ENCOUNTER — DOCUMENT SCAN (OUTPATIENT)
Dept: HOME HEALTH SERVICES | Facility: HOSPITAL | Age: 68
End: 2023-07-28
Payer: MEDICARE

## 2023-08-04 ENCOUNTER — DOCUMENT SCAN (OUTPATIENT)
Dept: HOME HEALTH SERVICES | Facility: HOSPITAL | Age: 68
End: 2023-08-04
Payer: MEDICARE

## 2023-08-07 ENCOUNTER — LAB REQUISITION (OUTPATIENT)
Dept: LAB | Facility: HOSPITAL | Age: 68
End: 2023-08-07
Payer: MEDICARE

## 2023-08-07 DIAGNOSIS — M06.9 RHEUMATOID ARTHRITIS, UNSPECIFIED: ICD-10-CM

## 2023-08-07 LAB
ALBUMIN SERPL-MCNC: 2.8 G/DL (ref 3.4–4.8)
ALBUMIN/GLOB SERPL: 1.5 RATIO (ref 1.1–2)
ALP SERPL-CCNC: 83 UNIT/L (ref 40–150)
ALT SERPL-CCNC: 14 UNIT/L (ref 0–55)
AST SERPL-CCNC: 17 UNIT/L (ref 5–34)
BASOPHILS # BLD AUTO: 0.04 X10(3)/MCL
BASOPHILS NFR BLD AUTO: 0.9 %
BILIRUBIN DIRECT+TOT PNL SERPL-MCNC: 0.3 MG/DL
BUN SERPL-MCNC: 13.9 MG/DL (ref 9.8–20.1)
CALCIUM SERPL-MCNC: 8.7 MG/DL (ref 8.4–10.2)
CHLORIDE SERPL-SCNC: 110 MMOL/L (ref 98–107)
CHOLEST SERPL-MCNC: 169 MG/DL
CHOLEST/HDLC SERPL: 4 {RATIO} (ref 0–5)
CO2 SERPL-SCNC: 27 MMOL/L (ref 23–31)
CREAT SERPL-MCNC: 0.76 MG/DL (ref 0.55–1.02)
EOSINOPHIL # BLD AUTO: 0.01 X10(3)/MCL (ref 0–0.9)
EOSINOPHIL NFR BLD AUTO: 0.2 %
ERYTHROCYTE [DISTWIDTH] IN BLOOD BY AUTOMATED COUNT: 13.7 % (ref 11.5–17)
GFR SERPLBLD CREATININE-BSD FMLA CKD-EPI: >60 MLS/MIN/1.73/M2
GLOBULIN SER-MCNC: 1.9 GM/DL (ref 2.4–3.5)
GLUCOSE SERPL-MCNC: 91 MG/DL (ref 82–115)
HCT VFR BLD AUTO: 34.4 % (ref 37–47)
HDLC SERPL-MCNC: 40 MG/DL (ref 35–60)
HGB BLD-MCNC: 10.6 G/DL (ref 12–16)
IMM GRANULOCYTES # BLD AUTO: 0.02 X10(3)/MCL (ref 0–0.04)
IMM GRANULOCYTES NFR BLD AUTO: 0.4 %
LDLC SERPL CALC-MCNC: 91 MG/DL (ref 50–140)
LYMPHOCYTES # BLD AUTO: 2.04 X10(3)/MCL (ref 0.6–4.6)
LYMPHOCYTES NFR BLD AUTO: 44.3 %
MCH RBC QN AUTO: 29.4 PG (ref 27–31)
MCHC RBC AUTO-ENTMCNC: 30.8 G/DL (ref 33–36)
MCV RBC AUTO: 95.6 FL (ref 80–94)
MONOCYTES # BLD AUTO: 0.46 X10(3)/MCL (ref 0.1–1.3)
MONOCYTES NFR BLD AUTO: 10 %
NEUTROPHILS # BLD AUTO: 2.04 X10(3)/MCL (ref 2.1–9.2)
NEUTROPHILS NFR BLD AUTO: 44.2 %
NRBC BLD AUTO-RTO: 0 %
PLATELET # BLD AUTO: 189 X10(3)/MCL (ref 130–400)
PMV BLD AUTO: 11.6 FL (ref 7.4–10.4)
POTASSIUM SERPL-SCNC: 3.8 MMOL/L (ref 3.5–5.1)
PROT SERPL-MCNC: 4.7 GM/DL (ref 5.8–7.6)
RBC # BLD AUTO: 3.6 X10(6)/MCL (ref 4.2–5.4)
SODIUM SERPL-SCNC: 147 MMOL/L (ref 136–145)
TRIGL SERPL-MCNC: 189 MG/DL (ref 37–140)
TSH SERPL-ACNC: 2.9 UIU/ML (ref 0.35–4.94)
VLDLC SERPL CALC-MCNC: 38 MG/DL
WBC # SPEC AUTO: 4.61 X10(3)/MCL (ref 4.5–11.5)

## 2023-08-07 PROCEDURE — 84443 ASSAY THYROID STIM HORMONE: CPT | Performed by: FAMILY MEDICINE

## 2023-08-07 PROCEDURE — 85025 COMPLETE CBC W/AUTO DIFF WBC: CPT | Performed by: FAMILY MEDICINE

## 2023-08-07 PROCEDURE — 80053 COMPREHEN METABOLIC PANEL: CPT | Performed by: FAMILY MEDICINE

## 2023-08-07 PROCEDURE — 80061 LIPID PANEL: CPT | Performed by: FAMILY MEDICINE

## 2023-08-15 ENCOUNTER — HOSPITAL ENCOUNTER (EMERGENCY)
Facility: HOSPITAL | Age: 68
Discharge: HOME | End: 2023-08-15
Attending: EMERGENCY MEDICINE
Payer: MEDICARE

## 2023-08-15 VITALS
DIASTOLIC BLOOD PRESSURE: 69 MMHG | RESPIRATION RATE: 16 BRPM | OXYGEN SATURATION: 93 % | HEIGHT: 65 IN | TEMPERATURE: 98 F | SYSTOLIC BLOOD PRESSURE: 110 MMHG | HEART RATE: 81 BPM | BODY MASS INDEX: 22.49 KG/M2 | WEIGHT: 135 LBS

## 2023-08-15 DIAGNOSIS — R07.9 CHEST PAIN, UNSPECIFIED TYPE: Primary | ICD-10-CM

## 2023-08-15 DIAGNOSIS — R07.9 CHEST PAIN: ICD-10-CM

## 2023-08-15 LAB
ALBUMIN SERPL-MCNC: 2.9 G/DL (ref 3.4–4.8)
ALBUMIN/GLOB SERPL: 1.5 RATIO (ref 1.1–2)
ALP SERPL-CCNC: 76 UNIT/L (ref 40–150)
ALT SERPL-CCNC: 9 UNIT/L (ref 0–55)
AST SERPL-CCNC: 12 UNIT/L (ref 5–34)
BASOPHILS # BLD AUTO: 0.04 X10(3)/MCL
BASOPHILS NFR BLD AUTO: 0.9 %
BILIRUB SERPL-MCNC: 0.4 MG/DL
BNP BLD-MCNC: <10 PG/ML
BUN SERPL-MCNC: 14.7 MG/DL (ref 9.8–20.1)
CALCIUM SERPL-MCNC: 7.9 MG/DL (ref 8.4–10.2)
CHLORIDE SERPL-SCNC: 108 MMOL/L (ref 98–107)
CO2 SERPL-SCNC: 26 MMOL/L (ref 23–31)
CREAT SERPL-MCNC: 0.95 MG/DL (ref 0.55–1.02)
EOSINOPHIL # BLD AUTO: 0.08 X10(3)/MCL (ref 0–0.9)
EOSINOPHIL NFR BLD AUTO: 1.8 %
ERYTHROCYTE [DISTWIDTH] IN BLOOD BY AUTOMATED COUNT: 14.1 % (ref 11.5–17)
GFR SERPLBLD CREATININE-BSD FMLA CKD-EPI: >60 MLS/MIN/1.73/M2
GLOBULIN SER-MCNC: 1.9 GM/DL (ref 2.4–3.5)
GLUCOSE SERPL-MCNC: 111 MG/DL (ref 82–115)
HCT VFR BLD AUTO: 30.1 % (ref 37–47)
HGB BLD-MCNC: 9.9 G/DL (ref 12–16)
IMM GRANULOCYTES # BLD AUTO: 0.01 X10(3)/MCL (ref 0–0.04)
IMM GRANULOCYTES NFR BLD AUTO: 0.2 %
LYMPHOCYTES # BLD AUTO: 1.62 X10(3)/MCL (ref 0.6–4.6)
LYMPHOCYTES NFR BLD AUTO: 35.6 %
MCH RBC QN AUTO: 29.7 PG (ref 27–31)
MCHC RBC AUTO-ENTMCNC: 32.9 G/DL (ref 33–36)
MCV RBC AUTO: 90.4 FL (ref 80–94)
MONOCYTES # BLD AUTO: 0.44 X10(3)/MCL (ref 0.1–1.3)
MONOCYTES NFR BLD AUTO: 9.7 %
NEUTROPHILS # BLD AUTO: 2.36 X10(3)/MCL (ref 2.1–9.2)
NEUTROPHILS NFR BLD AUTO: 51.8 %
NRBC BLD AUTO-RTO: 0 %
PLATELET # BLD AUTO: 168 X10(3)/MCL (ref 130–400)
PMV BLD AUTO: 11.1 FL (ref 7.4–10.4)
POTASSIUM SERPL-SCNC: 3.5 MMOL/L (ref 3.5–5.1)
PROT SERPL-MCNC: 4.8 GM/DL (ref 5.8–7.6)
RBC # BLD AUTO: 3.33 X10(6)/MCL (ref 4.2–5.4)
SODIUM SERPL-SCNC: 140 MMOL/L (ref 136–145)
TROPONIN I SERPL-MCNC: 0.01 NG/ML (ref 0–0.04)
TROPONIN I SERPL-MCNC: <0.01 NG/ML (ref 0–0.04)
WBC # SPEC AUTO: 4.55 X10(3)/MCL (ref 4.5–11.5)

## 2023-08-15 PROCEDURE — 96374 THER/PROPH/DIAG INJ IV PUSH: CPT

## 2023-08-15 PROCEDURE — 85025 COMPLETE CBC W/AUTO DIFF WBC: CPT | Performed by: EMERGENCY MEDICINE

## 2023-08-15 PROCEDURE — 80053 COMPREHEN METABOLIC PANEL: CPT | Performed by: EMERGENCY MEDICINE

## 2023-08-15 PROCEDURE — 96376 TX/PRO/DX INJ SAME DRUG ADON: CPT

## 2023-08-15 PROCEDURE — 83880 ASSAY OF NATRIURETIC PEPTIDE: CPT | Performed by: EMERGENCY MEDICINE

## 2023-08-15 PROCEDURE — 99285 EMERGENCY DEPT VISIT HI MDM: CPT | Mod: 25

## 2023-08-15 PROCEDURE — 93005 ELECTROCARDIOGRAM TRACING: CPT

## 2023-08-15 PROCEDURE — 84484 ASSAY OF TROPONIN QUANT: CPT | Performed by: EMERGENCY MEDICINE

## 2023-08-15 PROCEDURE — 63600175 PHARM REV CODE 636 W HCPCS: Performed by: EMERGENCY MEDICINE

## 2023-08-15 RX ORDER — KETOROLAC TROMETHAMINE 30 MG/ML
30 INJECTION, SOLUTION INTRAMUSCULAR; INTRAVENOUS
Status: COMPLETED | OUTPATIENT
Start: 2023-08-15 | End: 2023-08-15

## 2023-08-15 RX ADMIN — KETOROLAC TROMETHAMINE 30 MG: 30 INJECTION, SOLUTION INTRAMUSCULAR; INTRAVENOUS at 12:08

## 2023-08-15 RX ADMIN — KETOROLAC TROMETHAMINE 30 MG: 30 INJECTION, SOLUTION INTRAMUSCULAR; INTRAVENOUS at 03:08

## 2023-08-15 NOTE — ED PROVIDER NOTES
Encounter Date: 8/15/2023    SCRIBE #1 NOTE: I, Berna Roberson, am scribing for, and in the presence of,  Lopez Bennett III, MD. I have scribed the following portions of the note - the EKG reading. Other sections scribed: HPI, ROS, PE, MDM.       History     Chief Complaint   Patient presents with    Chest Pain     Patient c/o chest pressure from [facility].  Pt has hx of dementia baseline GCS 14.  Denies nausea or vomiting     68 year old female with a history of dementia, acid reflux, and RA presents to ED, via EMS, from [facility] for chest pain.  Pt reports sharp chest pain that is worse with deep breaths.  Pt denies any neck pain, back pain, or headache.  Pt is ambulatory at baseline and denies any leg swelling.    The history is provided by the patient.   Chest Pain  The current episode started just prior to arrival. Chest pain occurs intermittently. The chest pain is unchanged. The quality of the pain is described as sharp. The pain does not radiate. Pertinent negatives for primary symptoms include no fever, no fatigue, no shortness of breath, no wheezing, no abdominal pain, no nausea, no vomiting and no dizziness.     Review of patient's allergies indicates:   Allergen Reactions    Penicillin Hives    Penicillins Swelling     Past Medical History:   Diagnosis Date    Acid reflux     Arthritis     Dementia     Patient on Memantine BID    Fibromyalgia     Pneumonia     RA (rheumatoid arthritis)      Past Surgical History:   Procedure Laterality Date    ADENOIDECTOMY       SECTION      EYE SURGERY      HEMIARTHROPLASTY OF HIP Left 2023    Procedure: HEMIARTHROPLASTY, HIP;  Surgeon: Rodrick Andrew DO;  [facility]: [facility];  Service: Orthopedics;  Laterality: Left;    HYSTERECTOMY      TONSILLECTOMY       Family History   Problem Relation Age of Onset    Dementia Father     Dementia Maternal Grandmother     Dementia Maternal Grandfather      Social History     Tobacco Use    Smoking  status: Former     Current packs/day: 0.00     Types: Cigarettes    Smokeless tobacco: Never   Substance Use Topics    Alcohol use: Not Currently    Drug use: Never     Review of Systems   Constitutional:  Negative for fatigue, fever and unexpected weight change.   HENT:  Negative for congestion and rhinorrhea.    Eyes:  Negative for pain.   Respiratory:  Negative for chest tightness, shortness of breath and wheezing.    Cardiovascular:  Positive for chest pain. Negative for leg swelling.   Gastrointestinal:  Negative for abdominal pain, constipation, diarrhea, nausea and vomiting.   Genitourinary:  Negative for dysuria.   Musculoskeletal:  Negative for back pain and neck pain.   Skin:  Negative for rash.   Allergic/Immunologic: Negative for environmental allergies, food allergies and immunocompromised state.   Neurological:  Negative for dizziness, speech difficulty and headaches.   Hematological:  Does not bruise/bleed easily.   Psychiatric/Behavioral:  Negative for sleep disturbance and suicidal ideas.        Physical Exam     Initial Vitals [08/15/23 1221]   BP Pulse Resp Temp SpO2   134/70 80 18 98.1 °F (36.7 °C) (!) 94 %      MAP       --         Physical Exam    Nursing note and vitals reviewed.  Constitutional: No distress.   HENT:   Head: Normocephalic and atraumatic.   Neck: Trachea normal.   Cardiovascular:  Normal rate and regular rhythm.           No murmur heard.  Pulmonary/Chest: Breath sounds normal. No respiratory distress.   Point tender left chest wall   Abdominal: Abdomen is soft. Bowel sounds are normal. She exhibits no distension. There is no abdominal tenderness.   Musculoskeletal:         General: Normal range of motion.      Lumbar back: Normal range of motion.     Neurological: She is alert. She has normal strength. No cranial nerve deficit.   Oriented x 1   Skin: Skin is warm and dry. No rash noted.   Psychiatric: She has a normal mood and affect. Judgment normal.         ED Course    Procedures  Labs Reviewed   COMPREHENSIVE METABOLIC PANEL - Abnormal; Notable for the following components:       Result Value    Chloride 108 (*)     Calcium Level Total 7.9 (*)     Protein Total 4.8 (*)     Albumin Level 2.9 (*)     Globulin 1.9 (*)     All other components within normal limits   CBC WITH DIFFERENTIAL - Abnormal; Notable for the following components:    RBC 3.33 (*)     Hgb 9.9 (*)     Hct 30.1 (*)     MCHC 32.9 (*)     MPV 11.1 (*)     All other components within normal limits   TROPONIN I - Normal   TROPONIN I - Normal   B-TYPE NATRIURETIC PEPTIDE - Normal   CBC W/ AUTO DIFFERENTIAL    Narrative:     The following orders were created for panel order CBC auto differential.  Procedure                               Abnormality         Status                     ---------                               -----------         ------                     CBC with Differential[[id number]]        Abnormal            Final result                 Please view results for these tests on the individual orders.     EKG Readings: (Independently Interpreted)   Initial Reading: No STEMI. Rhythm: Normal Sinus Rhythm. Heart Rate: 81. Ectopy: No Ectopy. Conduction: Normal. ST Segments: Normal ST Segments. T Waves: Normal. Clinical Impression: Normal Sinus Rhythm   Time: 1218       Imaging Results              X-Ray Chest 1 View (Final result)  Result time 08/15/23 13:41:26      Final result by Wood Neville MD (08/15/23 13:41:26)                          Final result by Wood Neville MD (08/15/23 13:41:23)                   Impression:      NO ACUTE CARDIOPULMONARY PROCESS IDENTIFIED.      Electronically signed by: Wood Neville  Date:    08/15/2023  Time:    13:41               Narrative:    EXAMINATION:  XR CHEST 1 VIEW    CLINICAL HISTORY:  chest pain;    TECHNIQUE:  One view    COMPARISON:  January 4, 2023.    FINDINGS:  Cardiopericardial silhouette is within normal limits. Lungs are without dense focal or  segmental consolidation, congestive process, pleural effusions or pneumothorax.                                       Medications   ketorolac injection 30 mg (30 mg Intravenous Given 8/15/23 1252)   ketorolac injection 30 mg (30 mg Intravenous Given 8/15/23 1524)     Medical Decision Making  Differential diagnosis includes, but is not limited to: pleurisy, ACS, PNA, anxiety, costochondritis    EKG without abnormality patient is point tender in the part of her chest which she is pointing to chest pain.  Worse whenever she takes a deep breath EKG cardiac enzymes normal pain did go away entirely with Toradol repeat enzymes without abnormality repeat EKG also normal patient will be discharged follow-up with primary care return emergency room as needed    Problems Addressed:  Chest pain: complicated acute illness or injury that poses a threat to life or bodily functions    Amount and/or Complexity of Data Reviewed  Labs: ordered.  Radiology: ordered.  ECG/medicine tests: ordered.    Risk  Prescription drug management.                Scribe Attestation:   Scribe #1: I performed the above scribed service and the documentation accurately describes the services I performed. I attest to the accuracy of the note.    Attending Attestation:           Physician Attestation for Scribe:  Physician Attestation Statement for Scribe #1: I, Lopez Bennett III, MD, reviewed documentation, as scribed by Berna Roberson in my presence, and it is both accurate and complete.                          Clinical Impression:   Final diagnoses:  [R07.9] Chest pain  [R07.9] Chest pain, unspecified type (Primary)        ED Disposition Condition    Discharge Stable          ED Prescriptions    None       Follow-up Information       Follow up With Specialties Details Why Contact Info    Cindy Perez MD Internal Medicine In 1 week  [address] [place] [id number]  [phone]               Lopez Bennett III, MD  08/15/23  3235     Patient expressed no known problems or needs

## 2023-09-08 ENCOUNTER — LAB REQUISITION (OUTPATIENT)
Dept: LAB | Facility: HOSPITAL | Age: 68
End: 2023-09-08
Payer: MEDICARE

## 2023-09-08 DIAGNOSIS — R35.0 FREQUENCY OF MICTURITION: ICD-10-CM

## 2023-09-08 LAB
APPEARANCE UR: CLEAR
BACTERIA #/AREA URNS AUTO: ABNORMAL /HPF
BILIRUB UR QL STRIP.AUTO: NEGATIVE
COLOR UR: YELLOW
GLUCOSE UR QL STRIP.AUTO: NEGATIVE
KETONES UR QL STRIP.AUTO: NEGATIVE
LEUKOCYTE ESTERASE UR QL STRIP.AUTO: NEGATIVE
NITRITE UR QL STRIP.AUTO: POSITIVE
PH UR STRIP.AUTO: 6.5 [PH]
PROT UR QL STRIP.AUTO: ABNORMAL
RBC #/AREA URNS AUTO: ABNORMAL /HPF
RBC UR QL AUTO: NEGATIVE
SP GR UR STRIP.AUTO: >=1.03 (ref 1–1.03)
SQUAMOUS #/AREA URNS AUTO: ABNORMAL /HPF
UROBILINOGEN UR STRIP-ACNC: 1
WBC #/AREA URNS AUTO: ABNORMAL /HPF

## 2023-09-08 PROCEDURE — 81001 URINALYSIS AUTO W/SCOPE: CPT | Performed by: FAMILY MEDICINE

## 2023-09-08 PROCEDURE — 87077 CULTURE AEROBIC IDENTIFY: CPT | Performed by: FAMILY MEDICINE

## 2023-09-08 PROCEDURE — 87088 URINE BACTERIA CULTURE: CPT | Performed by: FAMILY MEDICINE

## 2023-09-10 LAB — BACTERIA UR CULT: ABNORMAL

## 2023-09-20 ENCOUNTER — LAB REQUISITION (OUTPATIENT)
Dept: LAB | Facility: HOSPITAL | Age: 68
End: 2023-09-20
Payer: MEDICARE

## 2023-09-20 DIAGNOSIS — J12.9 VIRAL PNEUMONIA, UNSPECIFIED: ICD-10-CM

## 2023-09-20 LAB
ALBUMIN SERPL-MCNC: 2.9 G/DL (ref 3.4–4.8)
ALBUMIN/GLOB SERPL: 1.4 RATIO (ref 1.1–2)
ALP SERPL-CCNC: 89 UNIT/L (ref 40–150)
ALT SERPL-CCNC: 8 UNIT/L (ref 0–55)
AST SERPL-CCNC: 13 UNIT/L (ref 5–34)
BASOPHILS # BLD AUTO: 0.03 X10(3)/MCL
BASOPHILS NFR BLD AUTO: 0.5 %
BILIRUB SERPL-MCNC: 0.4 MG/DL
BUN SERPL-MCNC: 16.8 MG/DL (ref 9.8–20.1)
CALCIUM SERPL-MCNC: 8.5 MG/DL (ref 8.4–10.2)
CHLORIDE SERPL-SCNC: 107 MMOL/L (ref 98–107)
CO2 SERPL-SCNC: 29 MMOL/L (ref 23–31)
CREAT SERPL-MCNC: 0.76 MG/DL (ref 0.55–1.02)
EOSINOPHIL # BLD AUTO: 0.15 X10(3)/MCL (ref 0–0.9)
EOSINOPHIL NFR BLD AUTO: 2.7 %
ERYTHROCYTE [DISTWIDTH] IN BLOOD BY AUTOMATED COUNT: 13.2 % (ref 11.5–17)
GFR SERPLBLD CREATININE-BSD FMLA CKD-EPI: >60 MLS/MIN/1.73/M2
GLOBULIN SER-MCNC: 2.1 GM/DL (ref 2.4–3.5)
GLUCOSE SERPL-MCNC: 78 MG/DL (ref 82–115)
HCT VFR BLD AUTO: 32.3 % (ref 37–47)
HGB BLD-MCNC: 10.1 G/DL (ref 12–16)
IMM GRANULOCYTES # BLD AUTO: 0.02 X10(3)/MCL (ref 0–0.04)
IMM GRANULOCYTES NFR BLD AUTO: 0.4 %
LYMPHOCYTES # BLD AUTO: 2.08 X10(3)/MCL (ref 0.6–4.6)
LYMPHOCYTES NFR BLD AUTO: 37.9 %
MCH RBC QN AUTO: 29.9 PG (ref 27–31)
MCHC RBC AUTO-ENTMCNC: 31.3 G/DL (ref 33–36)
MCV RBC AUTO: 95.6 FL (ref 80–94)
MONOCYTES # BLD AUTO: 0.55 X10(3)/MCL (ref 0.1–1.3)
MONOCYTES NFR BLD AUTO: 10 %
NEUTROPHILS # BLD AUTO: 2.66 X10(3)/MCL (ref 2.1–9.2)
NEUTROPHILS NFR BLD AUTO: 48.5 %
NRBC BLD AUTO-RTO: 0 %
PLATELET # BLD AUTO: 247 X10(3)/MCL (ref 130–400)
PMV BLD AUTO: 11.4 FL (ref 7.4–10.4)
POTASSIUM SERPL-SCNC: 3.8 MMOL/L (ref 3.5–5.1)
PROT SERPL-MCNC: 5 GM/DL (ref 5.8–7.6)
RBC # BLD AUTO: 3.38 X10(6)/MCL (ref 4.2–5.4)
SODIUM SERPL-SCNC: 146 MMOL/L (ref 136–145)
WBC # SPEC AUTO: 5.49 X10(3)/MCL (ref 4.5–11.5)

## 2023-09-20 PROCEDURE — 80053 COMPREHEN METABOLIC PANEL: CPT | Performed by: NURSE PRACTITIONER

## 2023-09-20 PROCEDURE — 85025 COMPLETE CBC W/AUTO DIFF WBC: CPT | Performed by: NURSE PRACTITIONER

## 2023-10-03 DIAGNOSIS — J12.9 VIRAL PNEUMONIA, UNSPECIFIED: Primary | ICD-10-CM

## 2023-10-04 ENCOUNTER — LAB REQUISITION (OUTPATIENT)
Dept: LAB | Facility: HOSPITAL | Age: 68
End: 2023-10-04
Payer: MEDICARE

## 2023-10-04 DIAGNOSIS — J12.9 VIRAL PNEUMONIA, UNSPECIFIED: ICD-10-CM

## 2023-10-04 LAB
ALBUMIN SERPL-MCNC: 2.8 G/DL (ref 3.4–4.8)
ALBUMIN/GLOB SERPL: 1.3 RATIO (ref 1.1–2)
ALP SERPL-CCNC: 107 UNIT/L (ref 40–150)
ALT SERPL-CCNC: 13 UNIT/L (ref 0–55)
AST SERPL-CCNC: 16 UNIT/L (ref 5–34)
BILIRUB SERPL-MCNC: 0.3 MG/DL
BUN SERPL-MCNC: 13.2 MG/DL (ref 9.8–20.1)
CALCIUM SERPL-MCNC: 7.9 MG/DL (ref 8.4–10.2)
CHLORIDE SERPL-SCNC: 103 MMOL/L (ref 98–107)
CO2 SERPL-SCNC: 28 MMOL/L (ref 23–31)
CREAT SERPL-MCNC: 0.84 MG/DL (ref 0.55–1.02)
ERYTHROCYTE [DISTWIDTH] IN BLOOD BY AUTOMATED COUNT: 13.2 % (ref 11.5–17)
GFR SERPLBLD CREATININE-BSD FMLA CKD-EPI: >60 MLS/MIN/1.73/M2
GLOBULIN SER-MCNC: 2.1 GM/DL (ref 2.4–3.5)
GLUCOSE SERPL-MCNC: 72 MG/DL (ref 82–115)
HCT VFR BLD AUTO: 29.9 % (ref 37–47)
HGB BLD-MCNC: 9.4 G/DL (ref 12–16)
MCH RBC QN AUTO: 29.9 PG (ref 27–31)
MCHC RBC AUTO-ENTMCNC: 31.4 G/DL (ref 33–36)
MCV RBC AUTO: 95.2 FL (ref 80–94)
NRBC BLD AUTO-RTO: 0 %
PLATELET # BLD AUTO: 237 X10(3)/MCL (ref 130–400)
PMV BLD AUTO: 11.6 FL (ref 7.4–10.4)
POTASSIUM SERPL-SCNC: 4.3 MMOL/L (ref 3.5–5.1)
PROT SERPL-MCNC: 4.9 GM/DL (ref 5.8–7.6)
RBC # BLD AUTO: 3.14 X10(6)/MCL (ref 4.2–5.4)
SODIUM SERPL-SCNC: 141 MMOL/L (ref 136–145)
WBC # SPEC AUTO: 7.02 X10(3)/MCL (ref 4.5–11.5)

## 2023-10-04 PROCEDURE — 85027 COMPLETE CBC AUTOMATED: CPT | Performed by: FAMILY MEDICINE

## 2023-10-04 PROCEDURE — 80053 COMPREHEN METABOLIC PANEL: CPT | Performed by: FAMILY MEDICINE

## 2023-10-11 ENCOUNTER — LAB REQUISITION (OUTPATIENT)
Dept: LAB | Facility: HOSPITAL | Age: 68
End: 2023-10-11
Payer: MEDICARE

## 2023-10-11 DIAGNOSIS — G30.0 ALZHEIMER'S DISEASE WITH EARLY ONSET (CODE): ICD-10-CM

## 2023-10-11 LAB
FOLATE SERPL-MCNC: 8.5 NG/ML (ref 7–31.4)
VIT B12 SERPL-MCNC: 275 PG/ML (ref 213–816)

## 2023-10-11 PROCEDURE — 82746 ASSAY OF FOLIC ACID SERUM: CPT | Performed by: FAMILY MEDICINE

## 2023-10-11 PROCEDURE — 82607 VITAMIN B-12: CPT | Performed by: FAMILY MEDICINE

## 2023-10-23 PROBLEM — J44.9 CHRONIC OBSTRUCTIVE PULMONARY DISEASE: Status: ACTIVE | Noted: 2023-10-23

## 2023-10-23 PROBLEM — R91.8 PULMONARY INFILTRATE: Status: ACTIVE | Noted: 2023-10-23

## 2023-12-28 ENCOUNTER — HOSPITAL ENCOUNTER (EMERGENCY)
Facility: HOSPITAL | Age: 68
Discharge: NURSING, REHAB OR LONG-TERM CARE | End: 2023-12-28
Attending: STUDENT IN AN ORGANIZED HEALTH CARE EDUCATION/TRAINING PROGRAM
Payer: MEDICARE

## 2023-12-28 VITALS
BODY MASS INDEX: 26.66 KG/M2 | WEIGHT: 160 LBS | TEMPERATURE: 97 F | OXYGEN SATURATION: 95 % | DIASTOLIC BLOOD PRESSURE: 80 MMHG | SYSTOLIC BLOOD PRESSURE: 141 MMHG | RESPIRATION RATE: 18 BRPM | HEIGHT: 65 IN | HEART RATE: 74 BPM

## 2023-12-28 DIAGNOSIS — U07.1 COVID-19: Primary | ICD-10-CM

## 2023-12-28 DIAGNOSIS — R79.89 ELEVATED TROPONIN: ICD-10-CM

## 2023-12-28 DIAGNOSIS — R06.02 SOB (SHORTNESS OF BREATH): ICD-10-CM

## 2023-12-28 DIAGNOSIS — N39.0 URINARY TRACT INFECTION WITHOUT HEMATURIA, SITE UNSPECIFIED: ICD-10-CM

## 2023-12-28 LAB
ALBUMIN SERPL-MCNC: 2.6 G/DL (ref 3.4–4.8)
ALBUMIN/GLOB SERPL: 0.9 RATIO (ref 1.1–2)
ALP SERPL-CCNC: 69 UNIT/L (ref 40–150)
ALT SERPL-CCNC: 23 UNIT/L (ref 0–55)
APPEARANCE UR: ABNORMAL
APTT PPP: 28.2 SECONDS (ref 23.2–33.7)
AST SERPL-CCNC: 41 UNIT/L (ref 5–34)
BACTERIA #/AREA URNS AUTO: ABNORMAL /HPF
BASOPHILS # BLD AUTO: 0.01 X10(3)/MCL
BASOPHILS NFR BLD AUTO: 0.2 %
BILIRUB SERPL-MCNC: 0.5 MG/DL
BILIRUB UR QL STRIP.AUTO: NEGATIVE
BNP BLD-MCNC: 17.8 PG/ML
BUN SERPL-MCNC: 18.7 MG/DL (ref 9.8–20.1)
CALCIUM SERPL-MCNC: 7.7 MG/DL (ref 8.4–10.2)
CHLORIDE SERPL-SCNC: 108 MMOL/L (ref 98–107)
CO2 SERPL-SCNC: 23 MMOL/L (ref 23–31)
COLOR UR AUTO: YELLOW
CREAT SERPL-MCNC: 0.74 MG/DL (ref 0.55–1.02)
EOSINOPHIL # BLD AUTO: 0 X10(3)/MCL (ref 0–0.9)
EOSINOPHIL NFR BLD AUTO: 0 %
ERYTHROCYTE [DISTWIDTH] IN BLOOD BY AUTOMATED COUNT: 14.5 % (ref 11.5–17)
FLUAV AG UPPER RESP QL IA.RAPID: NOT DETECTED
FLUBV AG UPPER RESP QL IA.RAPID: NOT DETECTED
GFR SERPLBLD CREATININE-BSD FMLA CKD-EPI: >60 MLS/MIN/1.73/M2
GLOBULIN SER-MCNC: 2.9 GM/DL (ref 2.4–3.5)
GLUCOSE SERPL-MCNC: 104 MG/DL (ref 82–115)
GLUCOSE UR QL STRIP.AUTO: NORMAL
HCT VFR BLD AUTO: 36.1 % (ref 37–47)
HGB BLD-MCNC: 11.1 G/DL (ref 12–16)
IMM GRANULOCYTES # BLD AUTO: 0.02 X10(3)/MCL (ref 0–0.04)
IMM GRANULOCYTES NFR BLD AUTO: 0.4 %
INR PPP: 1.1
KETONES UR QL STRIP.AUTO: ABNORMAL
LEUKOCYTE ESTERASE UR QL STRIP.AUTO: 500
LYMPHOCYTES # BLD AUTO: 0.85 X10(3)/MCL (ref 0.6–4.6)
LYMPHOCYTES NFR BLD AUTO: 16.5 %
MCH RBC QN AUTO: 27.8 PG (ref 27–31)
MCHC RBC AUTO-ENTMCNC: 30.7 G/DL (ref 33–36)
MCV RBC AUTO: 90.5 FL (ref 80–94)
MONOCYTES # BLD AUTO: 0.75 X10(3)/MCL (ref 0.1–1.3)
MONOCYTES NFR BLD AUTO: 14.5 %
MUCOUS THREADS URNS QL MICRO: ABNORMAL /LPF
NEUTROPHILS # BLD AUTO: 3.53 X10(3)/MCL (ref 2.1–9.2)
NEUTROPHILS NFR BLD AUTO: 68.4 %
NITRITE UR QL STRIP.AUTO: ABNORMAL
NRBC BLD AUTO-RTO: 0 %
PH UR STRIP.AUTO: 6 [PH]
PLATELET # BLD AUTO: 141 X10(3)/MCL (ref 130–400)
PMV BLD AUTO: 12.2 FL (ref 7.4–10.4)
POTASSIUM SERPL-SCNC: 3.6 MMOL/L (ref 3.5–5.1)
PROT SERPL-MCNC: 5.5 GM/DL (ref 5.8–7.6)
PROT UR QL STRIP.AUTO: ABNORMAL
PROTHROMBIN TIME: 14 SECONDS (ref 12.5–14.5)
RBC # BLD AUTO: 3.99 X10(6)/MCL (ref 4.2–5.4)
RBC #/AREA URNS AUTO: ABNORMAL /HPF
RBC UR QL AUTO: NEGATIVE
SARS-COV-2 RNA RESP QL NAA+PROBE: DETECTED
SODIUM SERPL-SCNC: 142 MMOL/L (ref 136–145)
SP GR UR STRIP.AUTO: 1.03 (ref 1–1.03)
SQUAMOUS #/AREA URNS LPF: ABNORMAL /HPF
TROPONIN I SERPL-MCNC: 0.09 NG/ML (ref 0–0.04)
TROPONIN I SERPL-MCNC: <0.01 NG/ML (ref 0–0.04)
UROBILINOGEN UR STRIP-ACNC: NORMAL
WBC # SPEC AUTO: 5.16 X10(3)/MCL (ref 4.5–11.5)
WBC #/AREA URNS AUTO: >100 /HPF
WBC CLUMPS UR QL AUTO: ABNORMAL

## 2023-12-28 PROCEDURE — 25000003 PHARM REV CODE 250: Performed by: STUDENT IN AN ORGANIZED HEALTH CARE EDUCATION/TRAINING PROGRAM

## 2023-12-28 PROCEDURE — 99285 EMERGENCY DEPT VISIT HI MDM: CPT | Mod: 25

## 2023-12-28 PROCEDURE — 87086 URINE CULTURE/COLONY COUNT: CPT | Performed by: STUDENT IN AN ORGANIZED HEALTH CARE EDUCATION/TRAINING PROGRAM

## 2023-12-28 PROCEDURE — 96366 THER/PROPH/DIAG IV INF ADDON: CPT

## 2023-12-28 PROCEDURE — 85025 COMPLETE CBC W/AUTO DIFF WBC: CPT | Performed by: STUDENT IN AN ORGANIZED HEALTH CARE EDUCATION/TRAINING PROGRAM

## 2023-12-28 PROCEDURE — 85730 THROMBOPLASTIN TIME PARTIAL: CPT | Performed by: STUDENT IN AN ORGANIZED HEALTH CARE EDUCATION/TRAINING PROGRAM

## 2023-12-28 PROCEDURE — 84484 ASSAY OF TROPONIN QUANT: CPT | Performed by: STUDENT IN AN ORGANIZED HEALTH CARE EDUCATION/TRAINING PROGRAM

## 2023-12-28 PROCEDURE — 96365 THER/PROPH/DIAG IV INF INIT: CPT

## 2023-12-28 PROCEDURE — 25000242 PHARM REV CODE 250 ALT 637 W/ HCPCS: Performed by: STUDENT IN AN ORGANIZED HEALTH CARE EDUCATION/TRAINING PROGRAM

## 2023-12-28 PROCEDURE — 94640 AIRWAY INHALATION TREATMENT: CPT

## 2023-12-28 PROCEDURE — 81001 URINALYSIS AUTO W/SCOPE: CPT | Performed by: STUDENT IN AN ORGANIZED HEALTH CARE EDUCATION/TRAINING PROGRAM

## 2023-12-28 PROCEDURE — 94761 N-INVAS EAR/PLS OXIMETRY MLT: CPT

## 2023-12-28 PROCEDURE — 0240U COVID/FLU A&B PCR: CPT | Performed by: STUDENT IN AN ORGANIZED HEALTH CARE EDUCATION/TRAINING PROGRAM

## 2023-12-28 PROCEDURE — 96375 TX/PRO/DX INJ NEW DRUG ADDON: CPT

## 2023-12-28 PROCEDURE — 93010 ELECTROCARDIOGRAM REPORT: CPT | Mod: ,,, | Performed by: INTERNAL MEDICINE

## 2023-12-28 PROCEDURE — 63600175 PHARM REV CODE 636 W HCPCS: Performed by: STUDENT IN AN ORGANIZED HEALTH CARE EDUCATION/TRAINING PROGRAM

## 2023-12-28 PROCEDURE — 85610 PROTHROMBIN TIME: CPT | Performed by: STUDENT IN AN ORGANIZED HEALTH CARE EDUCATION/TRAINING PROGRAM

## 2023-12-28 PROCEDURE — 93005 ELECTROCARDIOGRAM TRACING: CPT

## 2023-12-28 PROCEDURE — 83880 ASSAY OF NATRIURETIC PEPTIDE: CPT | Performed by: STUDENT IN AN ORGANIZED HEALTH CARE EDUCATION/TRAINING PROGRAM

## 2023-12-28 PROCEDURE — 80053 COMPREHEN METABOLIC PANEL: CPT | Performed by: STUDENT IN AN ORGANIZED HEALTH CARE EDUCATION/TRAINING PROGRAM

## 2023-12-28 RX ORDER — DEXAMETHASONE 6 MG/1
6 TABLET ORAL 2 TIMES DAILY WITH MEALS
Qty: 10 TABLET | Refills: 0 | Status: SHIPPED | OUTPATIENT
Start: 2023-12-28 | End: 2024-01-02

## 2023-12-28 RX ORDER — IPRATROPIUM BROMIDE AND ALBUTEROL SULFATE 2.5; .5 MG/3ML; MG/3ML
3 SOLUTION RESPIRATORY (INHALATION)
Status: COMPLETED | OUTPATIENT
Start: 2023-12-28 | End: 2023-12-28

## 2023-12-28 RX ORDER — CEFDINIR 300 MG/1
300 CAPSULE ORAL EVERY 12 HOURS
Qty: 14 CAPSULE | Refills: 0 | Status: SHIPPED | OUTPATIENT
Start: 2023-12-28 | End: 2024-01-04

## 2023-12-28 RX ORDER — ALBUTEROL SULFATE 90 UG/1
2 AEROSOL, METERED RESPIRATORY (INHALATION) EVERY 6 HOURS PRN
Qty: 18 G | Refills: 0 | Status: SHIPPED | OUTPATIENT
Start: 2023-12-28 | End: 2024-01-27

## 2023-12-28 RX ORDER — DEXAMETHASONE SODIUM PHOSPHATE 4 MG/ML
8 INJECTION, SOLUTION INTRA-ARTICULAR; INTRALESIONAL; INTRAMUSCULAR; INTRAVENOUS; SOFT TISSUE
Status: COMPLETED | OUTPATIENT
Start: 2023-12-28 | End: 2023-12-28

## 2023-12-28 RX ORDER — ALBUTEROL SULFATE 2.5 MG/.5ML
2.5 SOLUTION RESPIRATORY (INHALATION) EVERY 6 HOURS PRN
Qty: 30 EACH | Refills: 0 | Status: SHIPPED | OUTPATIENT
Start: 2023-12-28 | End: 2024-01-27

## 2023-12-28 RX ADMIN — IPRATROPIUM BROMIDE AND ALBUTEROL SULFATE 3 ML: 2.5; .5 SOLUTION RESPIRATORY (INHALATION) at 08:12

## 2023-12-28 RX ADMIN — CEFTRIAXONE SODIUM 2 G: 2 INJECTION, POWDER, FOR SOLUTION INTRAMUSCULAR; INTRAVENOUS at 10:12

## 2023-12-28 RX ADMIN — DEXAMETHASONE SODIUM PHOSPHATE 8 MG: 4 INJECTION, SOLUTION INTRA-ARTICULAR; INTRALESIONAL; INTRAMUSCULAR; INTRAVENOUS; SOFT TISSUE at 09:12

## 2023-12-28 NOTE — ED PROVIDER NOTES
Encounter Date: 2023       History     Chief Complaint   Patient presents with    Shortness of Breath     Sent from [facility] c/o SOB since this morning. Reported O2 sat of 83% at nursing home. EMS states O2 sat 94% RA. GCS 14, hx of alzheimer's.      Patient is a 68-year-old female with past medical history of severe dementia, rheumatoid arthritis, longstanding history of smoking, presents from [facility] for shortness of breath.  Patient reportedly 83% on room air.  When paramedics arrived patient was satting 94% on room air.  Currently patient is not on oxygen satting 96% on room air.  History is limited secondary to extensive history of dementia.  Patient denies any current symptoms.              Review of patient's allergies indicates:   Allergen Reactions    Penicillin Hives    Penicillins Swelling     Past Medical History:   Diagnosis Date    Acid reflux     Arthritis     Dementia     Patient on Memantine BID    Fibromyalgia     Pneumonia     RA (rheumatoid arthritis)      Past Surgical History:   Procedure Laterality Date    ADENOIDECTOMY       SECTION      EYE SURGERY      HEMIARTHROPLASTY OF HIP Left 2023    Procedure: HEMIARTHROPLASTY, HIP;  Surgeon: Rodrick Andrew DO;  Location: [facility];  Service: Orthopedics;  Laterality: Left;    HYSTERECTOMY      TONSILLECTOMY       Family History   Problem Relation Age of Onset    Dementia Father     Dementia Maternal Grandmother     Dementia Maternal Grandfather      Social History     Tobacco Use    Smoking status: Former     Types: Cigarettes    Smokeless tobacco: Never   Substance Use Topics    Alcohol use: Not Currently    Drug use: Never     Review of Systems   Constitutional:  Negative for fever.   HENT:  Negative for sore throat.    Eyes:  Negative for visual disturbance.   Respiratory:  Negative for shortness of breath.    Cardiovascular:  Negative for chest pain.   Gastrointestinal:  Negative for abdominal pain.    Genitourinary:  Negative for dysuria.   Musculoskeletal:  Negative for joint swelling.   Skin:  Negative for rash.   Neurological:  Negative for weakness.   Psychiatric/Behavioral:  Negative for confusion.        Physical Exam     Initial Vitals [12/28/23 0718]   BP Pulse Resp Temp SpO2   119/62 88 20 97.3 °F (36.3 °C) (!) 94 %      MAP       --         Physical Exam    Nursing note and vitals reviewed.  Constitutional: She appears well-developed and well-nourished.   HENT:   Head: Normocephalic and atraumatic.   Eyes: EOM are normal. Pupils are equal, round, and reactive to light.   Neck:   Normal range of motion.  Cardiovascular:  Normal rate, regular rhythm, normal heart sounds and intact distal pulses.           No murmur heard.  Pulmonary/Chest: Breath sounds normal. No respiratory distress. She has no wheezes. She has no rales.   Abdominal: Abdomen is soft. She exhibits no distension. There is no abdominal tenderness. There is no rebound.   Musculoskeletal:         General: No tenderness or edema. Normal range of motion.      Cervical back: Normal range of motion.     Neurological: She is alert. She has normal strength. No cranial nerve deficit.   Not oriented to person place or time.   Skin: Skin is warm and dry. Capillary refill takes less than 2 seconds. No rash noted. No erythema.   Psychiatric: She has a normal mood and affect.         ED Course   Procedures  Labs Reviewed   COMPREHENSIVE METABOLIC PANEL - Abnormal; Notable for the following components:       Result Value    Chloride 108 (*)     Calcium Level Total 7.7 (*)     Protein Total 5.5 (*)     Albumin Level 2.6 (*)     Albumin/Globulin Ratio 0.9 (*)     Aspartate Aminotransferase 41 (*)     All other components within normal limits   TROPONIN I - Abnormal; Notable for the following components:    Troponin-I 0.093 (*)     All other components within normal limits   COVID/FLU A&B PCR - Abnormal; Notable for the following components:    SARS-CoV-2  PCR Detected (*)     All other components within normal limits    Narrative:     The Xpert Xpress SARS-CoV-2/FLU/RSV plus is a rapid, multiplexed real-time PCR test intended for the simultaneous qualitative detection and differentiation of SARS-CoV-2, Influenza A, Influenza B, and respiratory syncytial virus (RSV) viral RNA in either nasopharyngeal swab or nasal swab specimens.         CBC WITH DIFFERENTIAL - Abnormal; Notable for the following components:    RBC 3.99 (*)     Hgb 11.1 (*)     Hct 36.1 (*)     MCHC 30.7 (*)     MPV 12.2 (*)     All other components within normal limits   URINALYSIS, REFLEX TO URINE CULTURE - Abnormal; Notable for the following components:    Appearance, UA Turbid (*)     Specific Gravity, UA 1.031 (*)     Protein, UA 2+ (*)     Ketones, UA 3+ (*)     Nitrites, UA 2+ (*)     Leukocyte Esterase,  (*)     WBC, UA >100 (*)     WBC Clumps, UA Few (*)     Bacteria, UA Many (*)     Mucous, UA Few (*)     All other components within normal limits   B-TYPE NATRIURETIC PEPTIDE - Normal   APTT - Normal   PROTIME-INR - Normal   CULTURE, URINE   CBC W/ AUTO DIFFERENTIAL    Narrative:     The following orders were created for panel order CBC auto differential.  Procedure                               Abnormality         Status                     ---------                               -----------         ------                     CBC with Differential[[id number]]       Abnormal            Final result                 Please view results for these tests on the individual orders.   TROPONIN I     EKG Readings: (Independently Interpreted)     Normal sinus rhythm.  Rate of 90.  Normal intervals.  No STEMI.     ECG Results              EKG 12-lead (Final result)  Result time 12/28/23 08:27:02      Final result by Interface, [facility] (12/28/23 08:27:02)                   Narrative:    Test Reason : R06.02,    Vent. Rate : 090 BPM     Atrial Rate : 090 BPM     P-R Int : 148 ms          QRS  Dur : 086 ms      QT Int : 384 ms       P-R-T Axes : 065 076 079 degrees     QTc Int : 469 ms    Normal sinus rhythm  Normal ECG  When compared with ECG of 15-AUG-2023 12:18,  No significant change was found  Confirmed by Rishi Perez MD ([id number]) on 12/28/2023 8:26:52 AM    Referred By:             Confirmed By:Rishi Perez MD                                  Imaging Results              X-Ray Chest AP Portable (Final result)  Result time 12/28/23 07:42:27      Final result by Tramaine Nielsen MD (12/28/23 07:42:27)                   Impression:      No acute cardiopulmonary process.      Electronically signed by: Tramaine Nielsen  Date:    12/28/2023  Time:    07:42               Narrative:    EXAMINATION:  XR CHEST AP PORTABLE    CLINICAL HISTORY:  Shortness of breath    TECHNIQUE:  Single view of the chest    COMPARISON:  08/15/2013    FINDINGS:  No focal opacification, pleural effusion, or pneumothorax.    The cardiomediastinal silhouette is within normal limits.    No acute osseous abnormality.                                       Medications   cefTRIAXone (ROCEPHIN) 2 g in dextrose 5 % in water (D5W) 100 mL IVPB (MB+) (has no administration in time range)   albuterol-ipratropium 2.5 mg-0.5 mg/3 mL nebulizer solution 3 mL (3 mLs Nebulization Given 12/28/23 0803)   dexAMETHasone injection 8 mg (8 mg Intravenous Given 12/28/23 0900)     Medical Decision Making  Problems Addressed:  COVID-19: acute illness or injury that poses a threat to life or bodily functions  Elevated troponin: acute illness or injury that poses a threat to life or bodily functions  SOB (shortness of breath): acute illness or injury that poses a threat to life or bodily functions  Urinary tract infection without hematuria, site unspecified: acute illness or injury that poses a threat to life or bodily functions    Amount and/or Complexity of Data Reviewed  Independent Historian: EMS     Details:   Collateral from family.  External Data  Reviewed: ECG and notes.  Labs: ordered.  Radiology: ordered and independent interpretation performed.  ECG/medicine tests: ordered and independent interpretation performed.    Risk  Prescription drug management.  Parenteral controlled substances.  Decision regarding hospitalization.               ED Course as of 12/28/23 1030   Thu Dec 28, 2023   0746 EKG normal sinus rhythm.  Rate of 90.  Normal intervals.  No STEMI. [RP]   1030 Discussed with Dr. Roper, given the patient is not requiring any supplemental oxygen, patient is safe to be discharged back to nursing home.  If repeat troponin stable, likely due to increased demand, safe for discharge back to nursing home with steroids, treatment for urinary tract infection.  If continued to rise, will admit with [facility] consult. [RP]      ED Course User Index  [RP] Abebe Paul MD               Medical Decision Making:   History:   I obtained history from: someone other than patient and EMS provider.       <> Summary of History:   Collateral from paramedics.  Patient with normal oxygenation on room air.  Old Medical Records: I decided to obtain old medical records.  Old Records Summarized: records from clinic visits, records from another hospital and records from previous admission(s).       <> Summary of Records:   Reviewed old records  Initial Assessment:   SOB  Differential Diagnosis:   Judging by the patient's chief complaint and pertinent history, the patient has the following possible differential diagnoses, including but not limited to the following.  Some of these are deemed to be lower likelihood and some more likely based on my physical exam and history combined with possible lab work and/or imaging studies.   Please see the pertinent studies, and refer to the HPI.  Some of these diagnoses will take further evaluation to fully rule out, perhaps as an outpatient and the patient was encouraged to follow up when discharged for more comprehensive  evaluation.    ACS, pneumonia, COVID/Flu, congestive heart failure, asthma, COPD, pleural effusion, pulmonary edema, acute bronchitis  Independently Interpreted Test(s):   I have ordered and independently interpreted X-rays - see prior notes.  I have ordered and independently interpreted EKG Reading(s) - see prior notes  Clinical Tests:   Lab Tests: Reviewed and Ordered  Radiological Study: Ordered and Reviewed  Medical Tests: Ordered and Reviewed  ED Management:   Patient is a 68-year-old female with extensive history of dementia presents to emergency department for shortness of breath.  See HPI.  See physical exam.  Oxygen saturation within normal limits on room air.  She has not requiring supplemental O2.  She was found to be COVID-19 positive.  She was given a dose of Decadron.  EKG without ST elevation.  Troponin minimally elevated likely due to demand,   With repeat normal.  Chest x-ray without evidence of infiltrates, widened mediastinum or pneumothorax.  Labs as noted.  Lab work with evidence of urinary tract infection.  Patient started on antibiotic.  Will give Decadron.  Continue albuterol hydro as the patient's previous pulmonology notes that the patient has a extensive history of smoking.  All results have been discussed.  Answered all questions at this time.  Patient has remained hemodynamically stable throughout her ED course without the use of any supplemental oxygen.  Answered all questions at this time.   Safe for discharge with strict return precautions including to return immediately if any hypoxia, fever, chest pain, leg swelling, or any other concerns.  Other:   I have discussed this case with another health care provider.       <> Summary of the Discussion:  Discussed case with Dr. Roper             Clinical Impression:  Final diagnoses:  [R06.02] SOB (shortness of breath)  [R79.89] Elevated troponin  [U07.1] COVID-19 (Primary)  [N39.0] Urinary tract infection without hematuria, site  unspecified                 Abebe Paul MD  12/29/23 [id number]

## 2023-12-28 NOTE — DISCHARGE INSTRUCTIONS
Follow-up with your primary care physician.      Take Decadron as prescribed.  You may give albuterol breathing treatments every 6 hours as needed.      Take antibiotic for urinary tract infection as prescribed.    Return to the emergency department if any new or worsening symptoms, nausea, vomiting, difficulty breathing, fever, headache, or any other concerns.

## 2023-12-30 LAB — BACTERIA UR CULT: ABNORMAL

## 2024-01-05 ENCOUNTER — LAB REQUISITION (OUTPATIENT)
Dept: LAB | Facility: HOSPITAL | Age: 69
End: 2024-01-05
Payer: MEDICARE

## 2024-01-05 DIAGNOSIS — D64.9 ANEMIA, UNSPECIFIED: ICD-10-CM

## 2024-01-05 LAB
ABS NEUT CALC (OHS): 4.31 X10(3)/MCL (ref 2.1–9.2)
ALBUMIN SERPL-MCNC: 2.3 G/DL (ref 3.4–4.8)
ALBUMIN/GLOB SERPL: 1.1 RATIO (ref 1.1–2)
ALP SERPL-CCNC: 70 UNIT/L (ref 40–150)
ALT SERPL-CCNC: 10 UNIT/L (ref 0–55)
AST SERPL-CCNC: 12 UNIT/L (ref 5–34)
BILIRUB SERPL-MCNC: 0.2 MG/DL
BUN SERPL-MCNC: 28.1 MG/DL (ref 9.8–20.1)
CALCIUM SERPL-MCNC: 7.8 MG/DL (ref 8.4–10.2)
CHLORIDE SERPL-SCNC: 110 MMOL/L (ref 98–107)
CO2 SERPL-SCNC: 27 MMOL/L (ref 23–31)
CREAT SERPL-MCNC: 0.67 MG/DL (ref 0.55–1.02)
EOSINOPHIL NFR BLD MANUAL: 0.27 X10(3)/MCL (ref 0–0.9)
EOSINOPHIL NFR BLD MANUAL: 3 % (ref 0–8)
ERYTHROCYTE [DISTWIDTH] IN BLOOD BY AUTOMATED COUNT: 14.2 % (ref 11.5–17)
GFR SERPLBLD CREATININE-BSD FMLA CKD-EPI: >60 MLS/MIN/1.73/M2
GLOBULIN SER-MCNC: 2.1 GM/DL (ref 2.4–3.5)
GLUCOSE SERPL-MCNC: 81 MG/DL (ref 82–115)
HCT VFR BLD AUTO: 30.1 % (ref 37–47)
HGB BLD-MCNC: 9.6 G/DL (ref 12–16)
HYPOCHROMIA BLD QL SMEAR: ABNORMAL
LYMPHOCYTES NFR BLD MANUAL: 3.48 X10(3)/MCL
LYMPHOCYTES NFR BLD MANUAL: 38 % (ref 13–40)
MCH RBC QN AUTO: 28.3 PG (ref 27–31)
MCHC RBC AUTO-ENTMCNC: 31.9 G/DL (ref 33–36)
MCV RBC AUTO: 88.8 FL (ref 80–94)
METAMYELOCYTES NFR BLD MANUAL: 3 %
MONOCYTES NFR BLD MANUAL: 0.73 X10(3)/MCL (ref 0.1–1.3)
MONOCYTES NFR BLD MANUAL: 8 % (ref 2–11)
MYELOCYTES NFR BLD MANUAL: 1 %
NEUTROPHILS NFR BLD MANUAL: 47 % (ref 47–80)
NRBC BLD AUTO-RTO: 0 %
PLATELET # BLD AUTO: 198 X10(3)/MCL (ref 130–400)
PLATELET # BLD EST: ADEQUATE 10*3/UL
PMV BLD AUTO: 12.6 FL (ref 7.4–10.4)
POTASSIUM SERPL-SCNC: 3.8 MMOL/L (ref 3.5–5.1)
PROT SERPL-MCNC: 4.4 GM/DL (ref 5.8–7.6)
RBC # BLD AUTO: 3.39 X10(6)/MCL (ref 4.2–5.4)
RBC MORPH BLD: ABNORMAL
SODIUM SERPL-SCNC: 143 MMOL/L (ref 136–145)
WBC # SPEC AUTO: 9.16 X10(3)/MCL (ref 4.5–11.5)

## 2024-01-05 PROCEDURE — 80053 COMPREHEN METABOLIC PANEL: CPT | Performed by: THORACIC SURGERY (CARDIOTHORACIC VASCULAR SURGERY)

## 2024-01-05 PROCEDURE — 85027 COMPLETE CBC AUTOMATED: CPT | Performed by: THORACIC SURGERY (CARDIOTHORACIC VASCULAR SURGERY)

## 2024-02-27 ENCOUNTER — LAB REQUISITION (OUTPATIENT)
Dept: LAB | Facility: HOSPITAL | Age: 69
End: 2024-02-27
Payer: MEDICARE

## 2024-02-27 DIAGNOSIS — M79.7 FIBROMYALGIA: ICD-10-CM

## 2024-02-27 DIAGNOSIS — I10 ESSENTIAL (PRIMARY) HYPERTENSION: ICD-10-CM

## 2024-02-27 DIAGNOSIS — J44.9 CHRONIC OBSTRUCTIVE PULMONARY DISEASE, UNSPECIFIED: ICD-10-CM

## 2024-02-27 DIAGNOSIS — M06.9 RHEUMATOID ARTHRITIS, UNSPECIFIED: ICD-10-CM

## 2024-02-27 LAB
ALBUMIN SERPL-MCNC: 2.6 G/DL (ref 3.4–4.8)
ALBUMIN/GLOB SERPL: 1.1 RATIO (ref 1.1–2)
ALP SERPL-CCNC: 104 UNIT/L (ref 40–150)
ALT SERPL-CCNC: 16 UNIT/L (ref 0–55)
AST SERPL-CCNC: 18 UNIT/L (ref 5–34)
BASOPHILS # BLD AUTO: 0.05 X10(3)/MCL
BASOPHILS NFR BLD AUTO: 0.9 %
BILIRUB SERPL-MCNC: 0.2 MG/DL
BUN SERPL-MCNC: 14.9 MG/DL (ref 9.8–20.1)
CALCIUM SERPL-MCNC: 8.1 MG/DL (ref 8.4–10.2)
CHLORIDE SERPL-SCNC: 111 MMOL/L (ref 98–107)
CHOLEST SERPL-MCNC: 144 MG/DL
CHOLEST/HDLC SERPL: 3 {RATIO} (ref 0–5)
CO2 SERPL-SCNC: 25 MMOL/L (ref 23–31)
CREAT SERPL-MCNC: 0.63 MG/DL (ref 0.55–1.02)
EOSINOPHIL # BLD AUTO: 0.18 X10(3)/MCL (ref 0–0.9)
EOSINOPHIL NFR BLD AUTO: 3.3 %
ERYTHROCYTE [DISTWIDTH] IN BLOOD BY AUTOMATED COUNT: 15.2 % (ref 11.5–17)
GFR SERPLBLD CREATININE-BSD FMLA CKD-EPI: >60 MLS/MIN/1.73/M2
GLOBULIN SER-MCNC: 2.3 GM/DL (ref 2.4–3.5)
GLUCOSE SERPL-MCNC: 76 MG/DL (ref 82–115)
HCT VFR BLD AUTO: 30.2 % (ref 37–47)
HDLC SERPL-MCNC: 45 MG/DL (ref 35–60)
HGB BLD-MCNC: 9.5 G/DL (ref 12–16)
IMM GRANULOCYTES # BLD AUTO: 0.01 X10(3)/MCL (ref 0–0.04)
IMM GRANULOCYTES NFR BLD AUTO: 0.2 %
LDLC SERPL CALC-MCNC: 86 MG/DL (ref 50–140)
LYMPHOCYTES # BLD AUTO: 2.18 X10(3)/MCL (ref 0.6–4.6)
LYMPHOCYTES NFR BLD AUTO: 39.9 %
MCH RBC QN AUTO: 29.6 PG (ref 27–31)
MCHC RBC AUTO-ENTMCNC: 31.5 G/DL (ref 33–36)
MCV RBC AUTO: 94.1 FL (ref 80–94)
MONOCYTES # BLD AUTO: 0.63 X10(3)/MCL (ref 0.1–1.3)
MONOCYTES NFR BLD AUTO: 11.5 %
NEUTROPHILS # BLD AUTO: 2.42 X10(3)/MCL (ref 2.1–9.2)
NEUTROPHILS NFR BLD AUTO: 44.2 %
NRBC BLD AUTO-RTO: 0 %
PLATELET # BLD AUTO: 185 X10(3)/MCL (ref 130–400)
PMV BLD AUTO: 12.4 FL (ref 7.4–10.4)
POTASSIUM SERPL-SCNC: 3.8 MMOL/L (ref 3.5–5.1)
PROT SERPL-MCNC: 4.9 GM/DL (ref 5.8–7.6)
RBC # BLD AUTO: 3.21 X10(6)/MCL (ref 4.2–5.4)
SODIUM SERPL-SCNC: 144 MMOL/L (ref 136–145)
TRIGL SERPL-MCNC: 67 MG/DL (ref 37–140)
TSH SERPL-ACNC: 1.33 UIU/ML (ref 0.35–4.94)
VALPROATE SERPL-MCNC: <12.5 UG/ML (ref 50–100)
VLDLC SERPL CALC-MCNC: 13 MG/DL
WBC # SPEC AUTO: 5.47 X10(3)/MCL (ref 4.5–11.5)

## 2024-02-27 PROCEDURE — 84443 ASSAY THYROID STIM HORMONE: CPT | Performed by: FAMILY MEDICINE

## 2024-02-27 PROCEDURE — 80053 COMPREHEN METABOLIC PANEL: CPT | Performed by: FAMILY MEDICINE

## 2024-02-27 PROCEDURE — 80164 ASSAY DIPROPYLACETIC ACD TOT: CPT | Performed by: FAMILY MEDICINE

## 2024-02-27 PROCEDURE — 85025 COMPLETE CBC W/AUTO DIFF WBC: CPT | Performed by: FAMILY MEDICINE

## 2024-02-27 PROCEDURE — 80061 LIPID PANEL: CPT | Performed by: FAMILY MEDICINE

## 2024-06-04 ENCOUNTER — LAB REQUISITION (OUTPATIENT)
Dept: LAB | Facility: HOSPITAL | Age: 69
End: 2024-06-04
Payer: MEDICARE

## 2024-06-04 DIAGNOSIS — R89.2 ABNORMAL LEVEL OF OTHER DRUGS, MEDICAMENTS AND BIOLOGICAL SUBSTANCES IN SPECIMENS FROM OTHER ORGANS, SYSTEMS AND TISSUES: ICD-10-CM

## 2024-06-04 LAB — VALPROATE SERPL-MCNC: 31.7 UG/ML (ref 50–100)

## 2024-06-04 PROCEDURE — 80164 ASSAY DIPROPYLACETIC ACD TOT: CPT | Performed by: FAMILY MEDICINE

## 2024-08-07 ENCOUNTER — LAB REQUISITION (OUTPATIENT)
Dept: LAB | Facility: HOSPITAL | Age: 69
End: 2024-08-07
Payer: MEDICARE

## 2024-08-07 DIAGNOSIS — D64.9 ANEMIA, UNSPECIFIED: ICD-10-CM

## 2024-08-07 LAB
ALBUMIN SERPL-MCNC: 2.5 G/DL (ref 3.4–4.8)
ALBUMIN/GLOB SERPL: 1.2 RATIO (ref 1.1–2)
ALP SERPL-CCNC: 73 UNIT/L (ref 40–150)
ALT SERPL-CCNC: 12 UNIT/L (ref 0–55)
ANION GAP SERPL CALC-SCNC: 8 MEQ/L
AST SERPL-CCNC: 15 UNIT/L (ref 5–34)
BASOPHILS # BLD AUTO: 0.03 X10(3)/MCL
BASOPHILS NFR BLD AUTO: 0.8 %
BILIRUB SERPL-MCNC: 0.2 MG/DL
BUN SERPL-MCNC: 17.2 MG/DL (ref 9.8–20.1)
CALCIUM SERPL-MCNC: 8 MG/DL (ref 8.4–10.2)
CHLORIDE SERPL-SCNC: 110 MMOL/L (ref 98–107)
CHOLEST SERPL-MCNC: 147 MG/DL
CHOLEST/HDLC SERPL: 3 {RATIO} (ref 0–5)
CO2 SERPL-SCNC: 28 MMOL/L (ref 23–31)
CREAT SERPL-MCNC: 0.73 MG/DL (ref 0.55–1.02)
CREAT/UREA NIT SERPL: 24
EOSINOPHIL # BLD AUTO: 0.13 X10(3)/MCL (ref 0–0.9)
EOSINOPHIL NFR BLD AUTO: 3.4 %
ERYTHROCYTE [DISTWIDTH] IN BLOOD BY AUTOMATED COUNT: 13.1 % (ref 11.5–17)
GFR SERPLBLD CREATININE-BSD FMLA CKD-EPI: >60 ML/MIN/1.73/M2
GLOBULIN SER-MCNC: 2.1 GM/DL (ref 2.4–3.5)
GLUCOSE SERPL-MCNC: 76 MG/DL (ref 82–115)
HCT VFR BLD AUTO: 28.8 % (ref 37–47)
HDLC SERPL-MCNC: 46 MG/DL (ref 35–60)
HGB BLD-MCNC: 9.1 G/DL (ref 12–16)
IMM GRANULOCYTES # BLD AUTO: 0.01 X10(3)/MCL (ref 0–0.04)
IMM GRANULOCYTES NFR BLD AUTO: 0.3 %
LDLC SERPL CALC-MCNC: 85 MG/DL (ref 50–140)
LYMPHOCYTES # BLD AUTO: 2.26 X10(3)/MCL (ref 0.6–4.6)
LYMPHOCYTES NFR BLD AUTO: 58.9 %
MCH RBC QN AUTO: 29.8 PG (ref 27–31)
MCHC RBC AUTO-ENTMCNC: 31.6 G/DL (ref 33–36)
MCV RBC AUTO: 94.4 FL (ref 80–94)
MONOCYTES # BLD AUTO: 0.31 X10(3)/MCL (ref 0.1–1.3)
MONOCYTES NFR BLD AUTO: 8.1 %
NEUTROPHILS # BLD AUTO: 1.1 X10(3)/MCL (ref 2.1–9.2)
NEUTROPHILS NFR BLD AUTO: 28.5 %
NRBC BLD AUTO-RTO: 0 %
PLATELET # BLD AUTO: 141 X10(3)/MCL (ref 130–400)
PMV BLD AUTO: 12.3 FL (ref 7.4–10.4)
POTASSIUM SERPL-SCNC: 3.7 MMOL/L (ref 3.5–5.1)
PROT SERPL-MCNC: 4.6 GM/DL (ref 5.8–7.6)
RBC # BLD AUTO: 3.05 X10(6)/MCL (ref 4.2–5.4)
SODIUM SERPL-SCNC: 146 MMOL/L (ref 136–145)
TRIGL SERPL-MCNC: 78 MG/DL (ref 37–140)
TSH SERPL-ACNC: 1.28 UIU/ML (ref 0.35–4.94)
VLDLC SERPL CALC-MCNC: 16 MG/DL
WBC # BLD AUTO: 3.84 X10(3)/MCL (ref 4.5–11.5)

## 2024-08-07 PROCEDURE — 80053 COMPREHEN METABOLIC PANEL: CPT | Performed by: FAMILY MEDICINE

## 2024-08-07 PROCEDURE — 80061 LIPID PANEL: CPT | Performed by: FAMILY MEDICINE

## 2024-08-07 PROCEDURE — 84443 ASSAY THYROID STIM HORMONE: CPT | Performed by: FAMILY MEDICINE

## 2024-08-07 PROCEDURE — 85025 COMPLETE CBC W/AUTO DIFF WBC: CPT | Performed by: FAMILY MEDICINE

## 2024-09-05 ENCOUNTER — TELEPHONE (OUTPATIENT)
Dept: INTERNAL MEDICINE | Facility: CLINIC | Age: 69
End: 2024-09-05
Payer: MEDICARE

## 2024-09-05 ENCOUNTER — PATIENT OUTREACH (OUTPATIENT)
Facility: CLINIC | Age: 69
End: 2024-09-05
Payer: MEDICARE

## 2024-09-05 NOTE — TELEPHONE ENCOUNTER
----- Message from Renetta Arango LPN sent at 9/5/2024  2:35 PM CDT -----  Regarding: I think this patient is in Nursing Home  Hi Bruna     I think this patient is in nursing Home. Dr Hurt has not seen her in over a year and Dr Davidson has been ordering for past year .    Thanks     Renetta PATEL

## 2024-09-05 NOTE — PROGRESS NOTES
Health Maintenance Topic(s) Outreach Outcomes & Actions Taken:    Breast Cancer Screening - Outreach Outcomes & Actions Taken  : Patient is resident of [facility]    Primary Care Appt - Outreach Outcomes & Actions Taken  : Patient is resident of [facility] ( [facility])                                      Additional Notes:           Care Management, Digital Medicine, and/or Education Referrals      Next Steps - Referral Actions: No referrals sent patient is resident of [facility]

## 2024-10-29 ENCOUNTER — HOSPITAL ENCOUNTER (OUTPATIENT)
Facility: HOSPITAL | Age: 69
LOS: 1 days | End: 2024-10-31
Attending: EMERGENCY MEDICINE | Admitting: INTERNAL MEDICINE
Payer: MEDICARE

## 2024-10-29 DIAGNOSIS — K29.70 GASTRITIS, PRESENCE OF BLEEDING UNSPECIFIED, UNSPECIFIED CHRONICITY, UNSPECIFIED GASTRITIS TYPE: Primary | ICD-10-CM

## 2024-10-29 DIAGNOSIS — R79.89 POSITIVE D DIMER: ICD-10-CM

## 2024-10-29 DIAGNOSIS — R09.89 SUSPECTED DEEP VEIN THROMBOSIS (DVT): ICD-10-CM

## 2024-10-29 DIAGNOSIS — I26.99 PULMONARY EMBOLISM, UNSPECIFIED CHRONICITY, UNSPECIFIED PULMONARY EMBOLISM TYPE, UNSPECIFIED WHETHER ACUTE COR PULMONALE PRESENT: ICD-10-CM

## 2024-10-29 DIAGNOSIS — Z87.19 HISTORY OF MELENA: ICD-10-CM

## 2024-10-29 PROCEDURE — 99285 EMERGENCY DEPT VISIT HI MDM: CPT

## 2024-10-30 LAB
ALBUMIN SERPL-MCNC: 3.2 G/DL (ref 3.4–4.8)
ALBUMIN/GLOB SERPL: 1.1 RATIO (ref 1.1–2)
ALP SERPL-CCNC: 92 UNIT/L (ref 40–150)
ALT SERPL-CCNC: 17 UNIT/L (ref 0–55)
ANION GAP SERPL CALC-SCNC: 9 MEQ/L
APTT PPP: 106.7 SECONDS (ref 23.2–33.7)
APTT PPP: >200 SECONDS (ref 23.2–33.7)
APTT PPP: >200 SECONDS (ref 23.2–33.7)
AST SERPL-CCNC: 19 UNIT/L (ref 5–34)
BASOPHILS # BLD AUTO: 0.01 X10(3)/MCL
BASOPHILS # BLD AUTO: 0.02 X10(3)/MCL
BASOPHILS NFR BLD AUTO: 0.1 %
BASOPHILS NFR BLD AUTO: 0.3 %
BILIRUB SERPL-MCNC: 0.5 MG/DL
BUN SERPL-MCNC: 26.9 MG/DL (ref 9.8–20.1)
CALCIUM SERPL-MCNC: 8.7 MG/DL (ref 8.4–10.2)
CHLORIDE SERPL-SCNC: 106 MMOL/L (ref 98–107)
CO2 SERPL-SCNC: 25 MMOL/L (ref 23–31)
CREAT SERPL-MCNC: 0.84 MG/DL (ref 0.55–1.02)
CREAT/UREA NIT SERPL: 32
D DIMER PPP IA.FEU-MCNC: 2.16 UG/ML FEU (ref 0–0.5)
EOSINOPHIL # BLD AUTO: 0.14 X10(3)/MCL (ref 0–0.9)
EOSINOPHIL # BLD AUTO: 0.2 X10(3)/MCL (ref 0–0.9)
EOSINOPHIL NFR BLD AUTO: 2 %
EOSINOPHIL NFR BLD AUTO: 2.4 %
ERYTHROCYTE [DISTWIDTH] IN BLOOD BY AUTOMATED COUNT: 13.6 % (ref 11.5–17)
ERYTHROCYTE [DISTWIDTH] IN BLOOD BY AUTOMATED COUNT: 13.8 % (ref 11.5–17)
GFR SERPLBLD CREATININE-BSD FMLA CKD-EPI: >60 ML/MIN/1.73/M2
GLOBULIN SER-MCNC: 2.8 GM/DL (ref 2.4–3.5)
GLUCOSE SERPL-MCNC: 120 MG/DL (ref 82–115)
HCT VFR BLD AUTO: 34.2 % (ref 37–47)
HCT VFR BLD AUTO: 34.6 % (ref 37–47)
HCT VFR BLD AUTO: 37.4 % (ref 37–47)
HGB BLD-MCNC: 11 G/DL (ref 12–16)
HGB BLD-MCNC: 11 G/DL (ref 12–16)
HGB BLD-MCNC: 12 G/DL (ref 12–16)
IMM GRANULOCYTES # BLD AUTO: 0.01 X10(3)/MCL (ref 0–0.04)
IMM GRANULOCYTES # BLD AUTO: 0.02 X10(3)/MCL (ref 0–0.04)
IMM GRANULOCYTES NFR BLD AUTO: 0.1 %
IMM GRANULOCYTES NFR BLD AUTO: 0.2 %
INR PPP: 1.2
LIPASE SERPL-CCNC: 16 U/L
LYMPHOCYTES # BLD AUTO: 1.2 X10(3)/MCL (ref 0.6–4.6)
LYMPHOCYTES # BLD AUTO: 2.66 X10(3)/MCL (ref 0.6–4.6)
LYMPHOCYTES NFR BLD AUTO: 16.8 %
LYMPHOCYTES NFR BLD AUTO: 32.5 %
MCH RBC QN AUTO: 30.2 PG (ref 27–31)
MCH RBC QN AUTO: 30.5 PG (ref 27–31)
MCHC RBC AUTO-ENTMCNC: 32.1 G/DL (ref 33–36)
MCHC RBC AUTO-ENTMCNC: 32.2 G/DL (ref 33–36)
MCV RBC AUTO: 94 FL (ref 80–94)
MCV RBC AUTO: 94.7 FL (ref 80–94)
MONOCYTES # BLD AUTO: 0.49 X10(3)/MCL (ref 0.1–1.3)
MONOCYTES # BLD AUTO: 0.52 X10(3)/MCL (ref 0.1–1.3)
MONOCYTES NFR BLD AUTO: 6 %
MONOCYTES NFR BLD AUTO: 7.3 %
NEUTROPHILS # BLD AUTO: 4.8 X10(3)/MCL (ref 2.1–9.2)
NEUTROPHILS # BLD AUTO: 5.27 X10(3)/MCL (ref 2.1–9.2)
NEUTROPHILS NFR BLD AUTO: 58.8 %
NEUTROPHILS NFR BLD AUTO: 73.5 %
NRBC BLD AUTO-RTO: 0 %
NRBC BLD AUTO-RTO: 0 %
PLATELET # BLD AUTO: 144 X10(3)/MCL (ref 130–400)
PLATELET # BLD AUTO: 162 X10(3)/MCL (ref 130–400)
PMV BLD AUTO: 12.2 FL (ref 7.4–10.4)
PMV BLD AUTO: 12.3 FL (ref 7.4–10.4)
POTASSIUM SERPL-SCNC: 4.2 MMOL/L (ref 3.5–5.1)
PROT SERPL-MCNC: 6 GM/DL (ref 5.8–7.6)
PROTHROMBIN TIME: 15.1 SECONDS (ref 12.5–14.5)
RBC # BLD AUTO: 3.61 X10(6)/MCL (ref 4.2–5.4)
RBC # BLD AUTO: 3.98 X10(6)/MCL (ref 4.2–5.4)
SODIUM SERPL-SCNC: 140 MMOL/L (ref 136–145)
WBC # BLD AUTO: 7.16 X10(3)/MCL (ref 4.5–11.5)
WBC # BLD AUTO: 8.18 X10(3)/MCL (ref 4.5–11.5)

## 2024-10-30 PROCEDURE — 85730 THROMBOPLASTIN TIME PARTIAL: CPT | Performed by: EMERGENCY MEDICINE

## 2024-10-30 PROCEDURE — 36415 COLL VENOUS BLD VENIPUNCTURE: CPT | Performed by: INTERNAL MEDICINE

## 2024-10-30 PROCEDURE — 80053 COMPREHEN METABOLIC PANEL: CPT | Performed by: EMERGENCY MEDICINE

## 2024-10-30 PROCEDURE — 25000003 PHARM REV CODE 250: Performed by: INTERNAL MEDICINE

## 2024-10-30 PROCEDURE — 85025 COMPLETE CBC W/AUTO DIFF WBC: CPT | Performed by: EMERGENCY MEDICINE

## 2024-10-30 PROCEDURE — 85014 HEMATOCRIT: CPT | Performed by: NURSE PRACTITIONER

## 2024-10-30 PROCEDURE — 25000003 PHARM REV CODE 250: Performed by: NURSE PRACTITIONER

## 2024-10-30 PROCEDURE — 85730 THROMBOPLASTIN TIME PARTIAL: CPT | Performed by: INTERNAL MEDICINE

## 2024-10-30 PROCEDURE — 21400001 HC TELEMETRY ROOM

## 2024-10-30 PROCEDURE — 63600175 PHARM REV CODE 636 W HCPCS: Performed by: INTERNAL MEDICINE

## 2024-10-30 PROCEDURE — 85379 FIBRIN DEGRADATION QUANT: CPT | Performed by: INTERNAL MEDICINE

## 2024-10-30 PROCEDURE — 63600175 PHARM REV CODE 636 W HCPCS: Performed by: EMERGENCY MEDICINE

## 2024-10-30 PROCEDURE — 85610 PROTHROMBIN TIME: CPT | Performed by: EMERGENCY MEDICINE

## 2024-10-30 PROCEDURE — 25500020 PHARM REV CODE 255: Performed by: EMERGENCY MEDICINE

## 2024-10-30 PROCEDURE — 85018 HEMOGLOBIN: CPT | Performed by: NURSE PRACTITIONER

## 2024-10-30 PROCEDURE — 25000242 PHARM REV CODE 250 ALT 637 W/ HCPCS: Performed by: NURSE PRACTITIONER

## 2024-10-30 PROCEDURE — 83690 ASSAY OF LIPASE: CPT | Performed by: EMERGENCY MEDICINE

## 2024-10-30 RX ORDER — DIVALPROEX SODIUM 250 MG/1
250 TABLET, FILM COATED, EXTENDED RELEASE ORAL DAILY
COMMUNITY

## 2024-10-30 RX ORDER — MEMANTINE HYDROCHLORIDE 5 MG/1
10 TABLET ORAL 2 TIMES DAILY
Status: DISCONTINUED | OUTPATIENT
Start: 2024-10-30 | End: 2024-10-31 | Stop reason: HOSPADM

## 2024-10-30 RX ORDER — QUETIAPINE FUMARATE 25 MG/1
25 TABLET, FILM COATED ORAL 2 TIMES DAILY
Status: DISCONTINUED | OUTPATIENT
Start: 2024-10-30 | End: 2024-10-31 | Stop reason: HOSPADM

## 2024-10-30 RX ORDER — METHYLPREDNISOLONE SOD SUCC 125 MG
125 VIAL (EA) INJECTION
Status: DISCONTINUED | OUTPATIENT
Start: 2024-10-30 | End: 2024-10-30

## 2024-10-30 RX ORDER — UMECLIDINIUM BROMIDE AND VILANTEROL TRIFENATATE 62.5; 25 UG/1; UG/1
1 POWDER RESPIRATORY (INHALATION) DAILY
COMMUNITY

## 2024-10-30 RX ORDER — DIVALPROEX SODIUM 500 MG/1
500 TABLET, DELAYED RELEASE ORAL NIGHTLY
COMMUNITY

## 2024-10-30 RX ORDER — METHOCARBAMOL 500 MG/1
1000 TABLET, FILM COATED ORAL NIGHTLY
Status: DISCONTINUED | OUTPATIENT
Start: 2024-10-30 | End: 2024-10-31 | Stop reason: HOSPADM

## 2024-10-30 RX ORDER — HYDROXYCHLOROQUINE SULFATE 200 MG/1
200 TABLET, FILM COATED ORAL 2 TIMES DAILY
Status: DISCONTINUED | OUTPATIENT
Start: 2024-10-30 | End: 2024-10-31 | Stop reason: HOSPADM

## 2024-10-30 RX ORDER — HEPARIN SODIUM,PORCINE/D5W 25000/250
0-40 INTRAVENOUS SOLUTION INTRAVENOUS CONTINUOUS
Status: DISCONTINUED | OUTPATIENT
Start: 2024-10-30 | End: 2024-10-31

## 2024-10-30 RX ORDER — PANTOPRAZOLE SODIUM 40 MG/10ML
40 INJECTION, POWDER, LYOPHILIZED, FOR SOLUTION INTRAVENOUS 2 TIMES DAILY
Status: DISCONTINUED | OUTPATIENT
Start: 2024-10-30 | End: 2024-10-31

## 2024-10-30 RX ORDER — FAMOTIDINE 20 MG/1
20 TABLET, FILM COATED ORAL DAILY
Status: ON HOLD | COMMUNITY
End: 2024-10-31 | Stop reason: HOSPADM

## 2024-10-30 RX ORDER — LEFLUNOMIDE 20 MG/1
20 TABLET ORAL DAILY
Status: DISCONTINUED | OUTPATIENT
Start: 2024-10-30 | End: 2024-10-31 | Stop reason: HOSPADM

## 2024-10-30 RX ORDER — PREGABALIN 25 MG/1
25 CAPSULE ORAL 2 TIMES DAILY
Status: DISCONTINUED | OUTPATIENT
Start: 2024-10-30 | End: 2024-10-31 | Stop reason: HOSPADM

## 2024-10-30 RX ORDER — DONEPEZIL HYDROCHLORIDE 5 MG/1
10 TABLET, FILM COATED ORAL DAILY
Status: DISCONTINUED | OUTPATIENT
Start: 2024-10-30 | End: 2024-10-31 | Stop reason: HOSPADM

## 2024-10-30 RX ORDER — DIVALPROEX SODIUM 250 MG/1
500 TABLET, DELAYED RELEASE ORAL NIGHTLY
Status: DISCONTINUED | OUTPATIENT
Start: 2024-10-30 | End: 2024-10-31 | Stop reason: HOSPADM

## 2024-10-30 RX ORDER — DIVALPROEX SODIUM 250 MG/1
250 TABLET, FILM COATED, EXTENDED RELEASE ORAL DAILY
Status: DISCONTINUED | OUTPATIENT
Start: 2024-10-30 | End: 2024-10-31 | Stop reason: HOSPADM

## 2024-10-30 RX ADMIN — MEMANTINE 10 MG: 5 TABLET ORAL at 08:10

## 2024-10-30 RX ADMIN — HEPARIN SODIUM 18 UNITS/KG/HR: 10000 INJECTION, SOLUTION INTRAVENOUS at 04:10

## 2024-10-30 RX ADMIN — HYDROXYCHLOROQUINE SULFATE 200 MG: 200 TABLET ORAL at 08:10

## 2024-10-30 RX ADMIN — DIVALPROEX SODIUM 250 MG: 250 TABLET, EXTENDED RELEASE ORAL at 08:10

## 2024-10-30 RX ADMIN — QUETIAPINE FUMARATE 25 MG: 25 TABLET ORAL at 08:10

## 2024-10-30 RX ADMIN — UMECLIDINIUM BROMIDE AND VILANTEROL TRIFENATATE 1 PUFF: 62.5; 25 POWDER RESPIRATORY (INHALATION) at 08:10

## 2024-10-30 RX ADMIN — DONEPEZIL HYDROCHLORIDE 10 MG: 5 TABLET, FILM COATED ORAL at 08:10

## 2024-10-30 RX ADMIN — METHOCARBAMOL 1000 MG: 500 TABLET ORAL at 08:10

## 2024-10-30 RX ADMIN — DIVALPROEX SODIUM 500 MG: 250 TABLET, DELAYED RELEASE ORAL at 08:10

## 2024-10-30 RX ADMIN — PREGABALIN 25 MG: 25 CAPSULE ORAL at 08:10

## 2024-10-30 RX ADMIN — PANTOPRAZOLE SODIUM 40 MG: 40 INJECTION, POWDER, FOR SOLUTION INTRAVENOUS at 08:10

## 2024-10-30 RX ADMIN — IOHEXOL 100 ML: 350 INJECTION, SOLUTION INTRAVENOUS at 01:10

## 2024-10-30 NOTE — ED PROVIDER NOTES
Encounter Date: 10/29/2024    SCRIBE #1 NOTE: I, Venu Steele, am scribing for, and in the presence of,  Nahum Styles MD. I have scribed the following portions of the note - Other sections scribed: HPI,ROS,PE.       History     Chief Complaint   Patient presents with    Abdominal Pain     Sent from [facility] for ABD pain, vomiting, diarrhea dark in color, -BT, GCS 14 baseline.      68 y/o female with PMHx of dementia, COPD, and HTN presents to ED via EMS c/o abdominal pain onset 10/29. Per triage note, pt is from [facility] and complained of abdominal pain, nausea, vomiting, and dark colored diarrhea. No blood thinners. GCS 14 baseline.     History and ROS limited due to pt's history of dementia.     The history is provided by medical records and the nursing home. No  was used.     Review of patient's allergies indicates:   Allergen Reactions    Penicillin Hives    Penicillins Swelling     Past Medical History:   Diagnosis Date    Acid reflux     Arthritis     Dementia     Patient on Memantine BID    Fibromyalgia     Pneumonia     RA (rheumatoid arthritis)      Past Surgical History:   Procedure Laterality Date    ADENOIDECTOMY       SECTION      EYE SURGERY      HEMIARTHROPLASTY OF HIP Left 2023    Procedure: HEMIARTHROPLASTY, HIP;  Surgeon: Rodrick Andrew DO;  Location: [facility];  Service: Orthopedics;  Laterality: Left;    HYSTERECTOMY      TONSILLECTOMY       Family History   Problem Relation Name Age of Onset    Dementia Father      Dementia Maternal Grandmother      Dementia Maternal Grandfather       Social History     Tobacco Use    Smoking status: Former     Types: Cigarettes    Smokeless tobacco: Never   Substance Use Topics    Alcohol use: Not Currently    Drug use: Never     Review of Systems   Unable to perform ROS: Dementia       Physical Exam     Initial Vitals [10/29/24 2325]   BP Pulse Resp Temp SpO2   124/73 87 20 98.1 °F (36.7 °C) 97 %      MAP        --         Physical Exam    Nursing note and vitals reviewed.  Constitutional: She appears well-developed and well-nourished. No distress.   HENT:   Head: Normocephalic and atraumatic.   Eyes: Conjunctivae are normal.   Cardiovascular:  Normal rate, regular rhythm, normal heart sounds and intact distal pulses.           Pulmonary/Chest: No respiratory distress. She has no rhonchi.   Abdominal: Abdomen is soft. Bowel sounds are normal. There is no abdominal tenderness. There is no rebound and no guarding.   Musculoskeletal:         General: No edema.     Neurological: She is alert. She has normal strength. GCS eye subscore is 4. GCS verbal subscore is 4. GCS motor subscore is 6.   Oriented to person.   Disoriented to place and time.    Skin: Skin is warm and dry.   Psychiatric: She has a normal mood and affect.         ED Course   Critical Care    Date/Time: 10/30/2024 3:04 AM    Performed by: Nahum Styles MD  Authorized by: Nahum Styles MD  Direct patient critical care time: 14 minutes  Additional history critical care time: 6 minutes  Ordering / reviewing critical care time: 5 minutes  Documentation critical care time: 9 minutes  Consulting other physicians critical care time: 4 minutes  Total critical care time (exclusive of procedural time) : 38 minutes  Critical care was necessary to treat or prevent imminent or life-threatening deterioration of the following conditions: circulatory failure and respiratory failure.  Critical care was time spent personally by me on the following activities: development of treatment plan with patient or surrogate, discussions with consultants, interpretation of cardiac output measurements, evaluation of patient's response to treatment, examination of patient, obtaining history from patient or surrogate, ordering and performing treatments and interventions, ordering and review of laboratory studies, ordering and review of radiographic studies, pulse oximetry and  re-evaluation of patient's condition.        Labs Reviewed   COMPREHENSIVE METABOLIC PANEL - Abnormal       Result Value    Sodium 140      Potassium 4.2      Chloride 106      CO2 25      Glucose 120 (*)     Blood Urea Nitrogen 26.9 (*)     Creatinine 0.84      Calcium 8.7      Protein Total 6.0      Albumin 3.2 (*)     Globulin 2.8      Albumin/Globulin Ratio 1.1      Bilirubin Total 0.5      ALP 92      ALT 17      AST 19      eGFR >60      Anion Gap 9.0      BUN/Creatinine Ratio 32     CBC WITH DIFFERENTIAL - Abnormal    WBC 7.16      RBC 3.98 (*)     Hgb 12.0      Hct 37.4      MCV 94.0      MCH 30.2      MCHC 32.1 (*)     RDW 13.8      Platelet 162      MPV 12.2 (*)     Neut % 73.5      Lymph % 16.8      Mono % 7.3      Eos % 2.0      Basophil % 0.3      Lymph # 1.20      Neut # 5.27      Mono # 0.52      Eos # 0.14      Baso # 0.02      IG# 0.01      IG% 0.1      NRBC% 0.0     LIPASE - Normal    Lipase Level 16     CBC W/ AUTO DIFFERENTIAL    Narrative:     The following orders were created for panel order CBC auto differential.  Procedure                               Abnormality         Status                     ---------                               -----------         ------                     CBC with Differential[[id number]]       Abnormal            Final result                 Please view results for these tests on the individual orders.   APTT   APTT   PROTIME-INR   CBC W/ AUTO DIFFERENTIAL    Narrative:     The following orders were created for panel order CBC auto differential.  Procedure                               Abnormality         Status                     ---------                               -----------         ------                     CBC with Differential[[id number]]                                                        Please view results for these tests on the individual orders.   CBC WITH DIFFERENTIAL          Imaging Results              CT Abdomen Pelvis With IV Contrast NO  Oral Contrast (Preliminary result)  Result time 10/30/24 02:31:51      Preliminary result by Juan Phan Jr., MD (10/30/24 02:31:51)                   Narrative:    START OF REPORT:  Technique: CT of the abdomen and pelvis was performed with axial images as well as sagittal and coronal reconstruction images with intravenous contrast.    Comparison: Comparison is with study dated 2022-03-22 14:15:10.    Clinical History: Periumbilical abdominal pain diarrhea poor historian.    Dosage Information: Automated Exposure Control was utilized.    Findings:  Lines and Tubes: None.  Thorax: Chest findings are discussed separately in the CT chest report.  Abdomen:  Abdominal Wall: No abdominal wall pathology is seen.  Liver: There is a fluid hypodensity in the left lobe measuring 0.8 cm which may represent a simple cyst. The liver otherwise appears unremarkable.  Biliary System: No intrahepatic or extrahepatic biliary duct dilatation is seen.  Gallbladder: The gallbladder appears unremarkable.  Pancreas: There is mild fatty replacement of the pancreas.  Spleen: The spleen appears unremarkable.  Adrenals: The adrenal glands appear unremarkable.  Kidneys: The left kidney appears unremarkable with no stones cysts masses or hydronephrosis. A single cyst measuring 6.3 mm is seen on Image 77, Series 2 in the mid pole of the right kidney.  Aorta: There is mild calcification of the abdominal aorta and its branches.  IVC: Unremarkable.  Bowel:  Esophagus: The visualized esophagus appears unremarkable.  Stomach: There is moderate wall thickening of the gastric body and gastric antrum.  Duodenum: Unremarkable appearing duodenum.  Small Bowel: Variable mild small bowel wall thickening concerning for enteritis.  Colon: Numerous diverticula are seen sigmoid colon through to the rectum. No associated inflammatory stranding or pericolonic fluid is seen to suggest diverticulitis.  Appendix: The appendix is not identified but no  inflammatory changes are seen in the right lower quadrant to suggest appendicitis.  Peritoneum: No intraperitoneal free air or ascites is seen.    Pelvis:  Bladder: The bladder appears unremarkable.  Female:  Uterus: The uterus is not identified.  Ovaries: No adnexal masses are seen.    Bony structures:  Dorsal Spine: There is moderate multilevel spondylosis of the visualized dorsal spine. There is grade I anterolisthesis of L4 over L5.  Bony Pelvis: There is degenerative change of the bilateral hips. Post-arthroplasty changes are detected in the left hip.      Impression:  1. There is moderate wall thickening of the gastric body and gastric antrum. This is concerning for gastritis.  2. Chest findings are discussed separately in the CT chest report.  3. Variable mild small bowel wall thickening concerning for enteritis.  4. Details and other findings as discussed above.                                         CTA Chest Non-Coronary (PE Studies) (Preliminary result)  Result time 10/30/24 02:13:16      Preliminary result by Juan Phan Jr., MD (10/30/24 02:13:16)                   Narrative:    START OF REPORT:  Technique: CT Scan of the chest was performed with intravenous contrast with direct axial images as well as sagittal and coronal reconstruction images pulmonary embolus protocol.    Dosage Information: Automated Exposure Control was utilized.    Comparison: None.    Clinical History: Chest pain.    Findings:  Soft Tissues: Unremarkable.  Lines and Tubes: None.  Neck: The visualized soft tissues of the neck appear unremarkable.  Mediastinum: The mediastinal structures are within normal limits.  Heart: The heart appears unremarkable.  Aorta: Mild aortic calcification is seen in the arch thoracic aorta.  Pulmonary Arteries: No right heart strain is seen. There are hypodense filling defects in the posterior, medial and lateral segmental branches of the right lower lobe pulmonary arteries consistent with  pulmonary thromboembolism. The rest of the bilateral pulmonary arteries are patent with no obvious filling defect.  Lungs: There is mild non specific dependent change at the lung bases. Otherwise clear lungs with no focal infiltrate or consolidation.  Pleura: No effusions or pneumothorax are identified.  Bony Structures:  Spine: The visualized dorsal spine appears unremarkable.  Ribs: No rib fractures are identified.    Notifications: The results were discussed with the emergency room physician Dr Styles prior to dictation at 2024-10-30 02:12:18 CDT.      Impression:  1. No right heart strain is seen. There are hypodense filling defects in the posterior, medial and lateral segmental branches of the right lower lobe pulmonary arteries consistent with pulmonary thromboembolism.  2. Details and other findings as discussed above.                                         Medications   heparin 25,000 units in dextrose 5% (100 units/ml) IV bolus from bag HIGH INTENSITY nomogram - LAF (has no administration in time range)   heparin 25,000 units in dextrose 5% 250 mL (100 units/mL) infusion HIGH INTENSITY nomogram - [facility]AF (has no administration in time range)   heparin 25,000 units in dextrose 5% (100 units/ml) IV bolus from bag HIGH INTENSITY nomogram - [facility]AF (has no administration in time range)   heparin 25,000 units in dextrose 5% (100 units/ml) IV bolus from bag HIGH INTENSITY nomogram - [facility]AF (has no administration in time range)   iohexoL (OMNIPAQUE 350) injection 100 mL (100 mLs Intravenous Given 10/30/24 0124)     Medical Decision Making  The differential diagnosis includes, but is not limited to, gastroenteritis, GI bleed, and dementia.     Amount and/or Complexity of Data Reviewed  Labs: ordered. Decision-making details documented in ED Course.  Radiology: ordered. Decision-making details documented in ED Course.            Scribe Attestation:   Scribe #1: I performed the above scribed service and the documentation  accurately describes the services I performed. I attest to the accuracy of the note.    Attending Attestation:           Physician Attestation for Scribe:  Physician Attestation Statement for Scribe #1: I, Nahum Styles MD, reviewed documentation, as scribed by Venu Steele in my presence, and it is both accurate and complete.             ED Course as of 10/30/24 0305   Wed Oct 30, 2024   0110 I reviewed the patient was nursing home paperwork she has a history of Alzheimer's dementia hypertension bipolar disorder COPD rheumatoid arthritis fibromyalgia.  Reportedly sent over for abdominal pain diarrhea and dark stool as well as vomiting.  Patient states she was some abdominal pain in the periumbilical region and just below that.  Her history and review of systems are severely limited by her dementia she was oriented to person only.  She was a DNR according to the lapost she was on omeprazole and Pepcid he was well as other medications has been no blood thinners listed on her paperwork [LF]   0111 Patient was no recent CT scans on records.  She reports pain in the umbilical region but history is severely limited [LF]   0213 Discussed with the radiologist, Dr Phan CT angiogram of the chest shows a small right lower lobe segmental PE no heart strain [LF]   0301 Nursing home reports they were concerned about blood in her stool because she was having black stools.  Nurse reports here they were only a few specks of black in her stool no overt melena.  On my rectal exam it was yellow stool occult negative for blood.  The CT of the abdomen pelvis did show concerns of gastritis which could be causing some intermittent bleeding.  She was not on thinners per my review of her medication list from the nursing home.  The CT technician noted concern of pulmonary embolus in the lungs when doing CT abdomen pelvis so CT angiogram was obtained does show right-sided pulmonary emboli without heart strain.  Patient will require  anticoagulation she was satting well and in no respiratory distress unfortunately history is limited by Alzheimer's dementia.  Due to nursing home report of melena I do have some concerns with starting anticoagulation and discharging her I discussed the case with the hospitalist who feels reasonable to start her on heparin and observe her overnight to make sure she was not develop bleeding repeat H&H hopefully transition to oral anticoagulation and discharged back to nursing home [LF]      ED Course User Index  [LF] Nahum Styles MD                           Clinical Impression:  Final diagnoses:  [K29.70] Gastritis, presence of bleeding unspecified, unspecified chronicity, unspecified gastritis type (Primary)  [I26.99] Pulmonary embolism, unspecified chronicity, unspecified pulmonary embolism type, unspecified whether acute cor pulmonale present  [Z87.19] History of melena          ED Disposition Condition    Admit Stable                Nahum Styles MD  10/30/24 9157

## 2024-10-30 NOTE — NURSING
Admission documentation completed by the admit nurse. Home med rec completed by ED nurse. Pt did not elect for meds to bed with [facility]. 4 Eyes and standing weight to be completed by the admitting floor nurse.

## 2024-10-30 NOTE — H&P
[facility]  Hospital Medicine History & Physical Examination       Patient Name: Gisele Meraz  MRN: [id number]  Patient Class: IP- Inpatient   Admission Date: 10/30/2024   Admitting Service: Hospital Medicine   Length of Stay: 0  Attending Physician: John Paul MD   Please see the attending MD's addend  Primary Care Provider: No primary care provider on file.  Face-to-Face encounter date: 10/30/2024  Code Status: DNR  Chief Complaint: Abdominal Pain (Sent from [facility] for ABD pain, vomiting, diarrhea dark in color, -BT, GCS 14 baseline. )      Present at Bedside: None   Patient information was obtained from patient, patient's family, past medical records and ER records.      HISTORY OF PRESENT ILLNESS:   Gisele Meraz is a 69 y.o. female with a past medical history of fibromyalgia, rheumatoid arthritis, GERD, and dementia who presented to [facility] on 10/29/2024 via EMS from [facility] for abdominal pain.  Nursing home reported patient was complaining of abdominal pain in addition to nausea, vomiting, and dark colored diarrhea.  Of note history is limited secondary to dementia, therefore majority of the history was obtained upon review of the medical record.  Initial vital signs in ED were /73, pulse 87, respirations 20, temperature 36.7° C, and SpO2 97% on room air.  Labs revealed WBC 7.16, RBC 3.98, hemoglobin 12, hematocrit 37.4, MCV 94, platelets 162, sodium 140, potassium 4.2, chloride 106, CO2 25, glucose 120, BUN 26.9, creatinine 0.84, and D-dimer 2.16.  CTA chest revealed right lower lobe segmental emboli without evidence of right heart strain.  CT abdomen and pelvis with IV contrast revealed pulmonary emboli of the right lower lobe, stomach poorly distended which limits evaluation with suggestion of wall thickening of the gastrium antrum-correlate for gastritis.  Patient was started on heparin drip in ED. Patient was admitted to hospital medicine service for  further medical management.     PAST MEDICAL HISTORY:   Fibromyalgia   Rheumatoid arthritis   GERD   Dementia    PAST SURGICAL HISTORY:     Past Surgical History:   Procedure Laterality Date    ADENOIDECTOMY       SECTION      EYE SURGERY      HEMIARTHROPLASTY OF HIP Left 2023    Procedure: HEMIARTHROPLASTY, HIP;  Surgeon: Rodrick Andrew DO;  Location: [facility];  Service: Orthopedics;  Laterality: Left;    HYSTERECTOMY      TONSILLECTOMY         FAMILY HISTORY:   Reviewed and negative    SOCIAL HISTORY:   Denied alcohol, tobacco or illicit drug use.     Screening for Social Drivers for health:  Patient screened for food insecurity, housing instability, transportation needs, utility difficulties, and interpersonal safety (select all that apply as identified as concern)  []Housing or Food  []Transportation Needs  []Utility Difficulties  []Interpersonal safety  [x]None    ALLERGIES:   Penicillin and Penicillins    HOME MEDICATIONS:     Prior to Admission medications    Medication Sig Start Date End Date Taking? Authorizing Provider   divalproex (DEPAKOTE) 500 MG TbEC Take 500 mg by mouth nightly.   Yes Provider, Historical   divalproex ER (DEPAKOTE ER) 250 MG 24 hr tablet Take 250 mg by mouth once daily.   Yes Provider, Historical   donepeziL (ARICEPT) 10 MG tablet Take 1 tablet (10 mg total) by mouth once daily. 23  Yes Cindy Perez MD   famotidine (PEPCID) 20 MG tablet Take 20 mg by mouth once daily.   Yes Provider, Historical   hydrOXYchloroQUINE (PLAQUENIL) 200 mg tablet TAKE 1 TABLET TWICE DAILY 23  Yes Monroe Bhandari MD   leflunomide (ARAVA) 20 MG Tab Take 1 tablet (20 mg total) by mouth once daily. 4/27/23 10/30/24 Yes Monroe Bhandari MD   memantine (NAMENDA) 10 MG Tab TAKE 1 TABLET TWICE DAILY 23  Yes Kylee Vela, CLEVE   methocarbamoL (ROBAXIN) 500 MG Tab Take 2 tablets (1,000 mg total) by mouth nightly. 23  Yes Monroe Bhandari MD   multivitamin with  minerals tablet Take 1 tablet by mouth once daily.   Yes Provider, Historical   omeprazole (PRILOSEC) 40 MG capsule Take 1 capsule (40 mg total) by mouth once daily. 4/27/23  Yes Monroe Bhandari MD   pregabalin (LYRICA) 25 MG capsule Take 1 capsule (25 mg total) by mouth 2 (two) times daily. 4/27/23 10/30/24 Yes Monroe Bhandari MD   umeclidinium-vilanteroL (ANORO ELLIPTA) 62.5-25 mcg/actuation DsDv Inhale 1 puff into the lungs once daily. Controller   Yes Provider, Historical   albuterol sulfate 2.5 mg/0.5 mL Nebu Take 2.5 mg by nebulization every 6 (six) hours as needed. Rescue 12/28/23 1/27/24  Abebe Paul MD   melatonin (MELATIN) 3 mg tablet Take 2 tablets (6 mg total) by mouth nightly as needed for Insomnia. 1/18/23   Cindy Perez MD   meloxicam (MOBIC) 7.5 MG tablet Take 1 tablet (7.5 mg total) by mouth daily as needed for Pain. 7/19/23   Cindy Perez MD   ondansetron (ZOFRAN-ODT) 8 MG TbDL Take 1 tablet (8 mg total) by mouth every 8 (eight) hours as needed (nausea). 1/18/23   Cindy Perez MD   predniSONE (DELTASONE) 5 MG tablet Take 1 tablet (5 mg total) by mouth daily as needed (flare ups). 4/27/23   Monroe Bhandari MD   QUEtiapine (SEROQUEL) 25 MG Tab Take one tablet twice daily. Okay to take an extra half to one tablet daily if needed for increased anxiety, irritability or insomnia.  Patient taking differently: 25 mg 2 (two) times daily. 1/18/23   iCndy Perez MD   DULoxetine (CYMBALTA) 60 MG capsule Take 1 capsule (60 mg total) by mouth 2 (two) times daily. 8/18/22 10/30/24  Monroe Bhandari MD     ________________________________________________________________________  INPATIENT LIST OF MEDICATIONS     Current Facility-Administered Medications:     divalproex EC tablet 500 mg, 500 mg, Oral, Nightly, Jolie Cohen FNP    divalproex ER 24 hr tablet 250 mg, 250 mg, Oral, Daily, Jolie Cohen FNP, 250 mg at 10/30/24 0803    donepeziL  tablet 10 mg, 10 mg, Oral, Daily, Jolie Cohen FNP, 10 mg at 10/30/24 0803    heparin 25,000 units in dextrose 5% (100 units/ml) IV bolus from bag HIGH INTENSITY nomogram - LAF, 60 Units/kg (Adjusted), Intravenous, PRN, Nahum Styles MD    heparin 25,000 units in dextrose 5% (100 units/ml) IV bolus from bag HIGH INTENSITY nomogram - LAF, 30 Units/kg (Adjusted), Intravenous, PRN, Nahum Styles MD    heparin 25,000 units in dextrose 5% 250 mL (100 units/mL) infusion HIGH INTENSITY nomogram - LAF, 0-40 Units/kg/hr (Adjusted), Intravenous, Continuous, Nahum Styles MD, Stopped at 10/30/24 1152    hydroxychloroquine tablet 200 mg, 200 mg, Oral, BID, Jolie Cohen FNP, 200 mg at 10/30/24 0803    leflunomide tablet 20 mg, 20 mg, Oral, Daily, Jolie Cohen FNP    memantine tablet 10 mg, 10 mg, Oral, BID, John Paul MD, 10 mg at 10/30/24 0803    methocarbamoL tablet 1,000 mg, 1,000 mg, Oral, Nightly, John Paul MD    pantoprazole injection 40 mg, 40 mg, Intravenous, BID, John Paul MD, 40 mg at 10/30/24 0803    pregabalin capsule 25 mg, 25 mg, Oral, BID, Jolie Cohen FNP, 25 mg at 10/30/24 0803    QUEtiapine tablet 25 mg, 25 mg, Oral, BID, Jolie Cohen FNP, 25 mg at 10/30/24 0803    umeclidinium-vilanteroL 62.5-25 mcg/actuation DsDv 1 puff, 1 puff, Inhalation, Daily, Jolie Cohen FNP, 1 puff at 10/30/24 0825    Current Outpatient Medications:     divalproex (DEPAKOTE) 500 MG TbEC, Take 500 mg by mouth nightly., Disp: , Rfl:     divalproex ER (DEPAKOTE ER) 250 MG 24 hr tablet, Take 250 mg by mouth once daily., Disp: , Rfl:     donepeziL (ARICEPT) 10 MG tablet, Take 1 tablet (10 mg total) by mouth once daily., Disp: 90 tablet, Rfl: 3    famotidine (PEPCID) 20 MG tablet, Take 20 mg by mouth once daily., Disp: , Rfl:     hydrOXYchloroQUINE (PLAQUENIL) 200 mg tablet, TAKE 1 TABLET TWICE DAILY, Disp: 180 tablet, Rfl: 3    leflunomide (ARAVA) 20 MG Tab, Take 1 tablet (20 mg  total) by mouth once daily., Disp: 90 tablet, Rfl: 3    memantine (NAMENDA) 10 MG Tab, TAKE 1 TABLET TWICE DAILY, Disp: 180 tablet, Rfl: 3    methocarbamoL (ROBAXIN) 500 MG Tab, Take 2 tablets (1,000 mg total) by mouth nightly., Disp: 180 tablet, Rfl: 3    multivitamin with minerals tablet, Take 1 tablet by mouth once daily., Disp: , Rfl:     omeprazole (PRILOSEC) 40 MG capsule, Take 1 capsule (40 mg total) by mouth once daily., Disp: 90 capsule, Rfl: 3    pregabalin (LYRICA) 25 MG capsule, Take 1 capsule (25 mg total) by mouth 2 (two) times daily., Disp: 180 capsule, Rfl: 3    umeclidinium-vilanteroL (ANORO ELLIPTA) 62.5-25 mcg/actuation DsDv, Inhale 1 puff into the lungs once daily. Controller, Disp: , Rfl:     albuterol sulfate 2.5 mg/0.5 mL Nebu, Take 2.5 mg by nebulization every 6 (six) hours as needed. Rescue, Disp: 30 each, Rfl: 0    melatonin (MELATIN) 3 mg tablet, Take 2 tablets (6 mg total) by mouth nightly as needed for Insomnia., Disp: 30 tablet, Rfl: 0    meloxicam (MOBIC) 7.5 MG tablet, Take 1 tablet (7.5 mg total) by mouth daily as needed for Pain., Disp: 30 tablet, Rfl: 0    ondansetron (ZOFRAN-ODT) 8 MG TbDL, Take 1 tablet (8 mg total) by mouth every 8 (eight) hours as needed (nausea)., Disp: 30 tablet, Rfl: 0    predniSONE (DELTASONE) 5 MG tablet, Take 1 tablet (5 mg total) by mouth daily as needed (flare ups)., Disp: 90 tablet, Rfl: 3    QUEtiapine (SEROQUEL) 25 MG Tab, Take one tablet twice daily. Okay to take an extra half to one tablet daily if needed for increased anxiety, irritability or insomnia. (Patient taking differently: 25 mg 2 (two) times daily.), Disp: 60 tablet, Rfl: 3    Scheduled Meds:   divalproex  500 mg Oral Nightly    divalproex ER  250 mg Oral Daily    donepeziL  10 mg Oral Daily    hydroxychloroquine  200 mg Oral BID    leflunomide  20 mg Oral Daily    memantine  10 mg Oral BID    methocarbamoL  1,000 mg Oral Nightly    pantoprazole  40 mg Intravenous BID    pregabalin  25 mg  Oral BID    QUEtiapine  25 mg Oral BID    umeclidinium-vilanteroL  1 puff Inhalation Daily     Continuous Infusions:   heparin (porcine) in D5W  0-40 Units/kg/hr (Adjusted) Intravenous Continuous   Stopped at 10/30/24 1152     PRN Meds:.  Current Facility-Administered Medications:     heparin (PORCINE), 60 Units/kg (Adjusted), Intravenous, PRN    heparin (PORCINE), 30 Units/kg (Adjusted), Intravenous, PRN      REVIEW OF SYSTEMS:   Except as documented, all other systems reviewed and negative.    PHYSICAL EXAM:     VITAL SIGNS: 24 HRS MIN & MAX LAST   Temp  Min: 97.9 °F (36.6 °C)  Max: 98.1 °F (36.7 °C) 97.9 °F (36.6 °C)   BP  Min: 112/89  Max: 158/82 (!) 158/82   Pulse  Min: 70  Max: 87  75   Resp  Min: 12  Max: 20 15   SpO2  Min: 88 %  Max: 100 % 96 %       General appearance: Female in no apparent distress.  HENT: Atraumatic head.   Eyes: Normal extraocular movements.   Neck: Supple.   Lungs: Clear to auscultation bilaterally.   Heart: Regular rate and rhythm.  Abdomen: Soft, non-distended, non-tender.  Extremities: No cyanosis, clubbing, or deformities.   Skin: No Rash.   Neuro: Awake, alert, and oriented. Motor and sensory exams grossly intact.   Psych/mental status: Appropriate mood and affect. Responds appropriately to questions.     LABS AND IMAGING:     Recent Labs   Lab 10/30/24  0119 10/30/24  0503 10/30/24  1038   WBC 7.16 8.18  --    RBC 3.98* 3.61*  --    HGB 12.0 11.0* 11.0*   HCT 37.4 34.2* 34.6*   MCV 94.0 94.7*  --    MCH 30.2 30.5  --    MCHC 32.1* 32.2*  --    RDW 13.8 13.6  --     144  --    MPV 12.2* 12.3*  --        Recent Labs   Lab 10/30/24  0119      K 4.2      CO2 25   BUN 26.9*   CREATININE 0.84   CALCIUM 8.7   ALBUMIN 3.2*   ALKPHOS 92   ALT 17   AST 19   BILITOT 0.5       Microbiology Results (last 7 days)       ** No results found for the last 168 hours. **             CT Abdomen Pelvis With IV Contrast NO Oral Contrast  Addendum: Suggest endoscopy to evaluate  gastritis and exclude underlying mural  gastric lesion.     Electronically signed by: Deisy Valerio   Date:    10/30/2024   Time:    07:26  Narrative: EXAMINATION:  CT ABDOMEN PELVIS WITH IV CONTRAST    CLINICAL HISTORY:  Periumbilical abdominal pain diarrhea poor historian;    TECHNIQUE:  Helically acquired images with axial, sagittal and coronal reformations were obtained from the lung bases to the pubic symphysis after the IV administration of contrast.    Automated tube current modulation, weight-based exposure dosing, and/or iterative reconstruction technique utilized to reach lowest reasonably achievable exposure rate.    DLP: 938 mGy*cm    COMPARISON:  No relevant prior available for comparison at the time of dictation.    FINDINGS:  LUNG BASES: Filling defects noted at the right lower lobe pulmonary artery.    LIVER: Too small to characterize hypodensity at the left lobe of the liver, statistically a small cyst or hemangioma in a patient with no known history of malignancy.    BILIARY: No calcified gallstones.    PANCREAS: No inflammatory change.    SPLEEN: Normal in size    ADRENALS: No mass.    KIDNEYS/URETERS: No hydronephrosis.  Renal cortical scarring on the left.    GI TRACT/MESENTERY: The stomach is poorly distended which limits evaluation.  Unable to exclude gastritis.  There is questionable wall thickening at the gastric antrum.  No evidence of bowel obstruction.   Colonic diverticulosis without acute inflammatory change.    PERITONEUM: No free fluid.No free air.    LYMPH NODES: Indeterminate 1.1 cm peripancreatic lymph node.    VASCULATURE: Aortoiliac atherosclerosis.    BLADDER: Normal appearance given degree of distention.    REPRODUCTIVE ORGANS: Uterus is surgically absent.    SOFT TISSUES: Unremarkable.    BONES: Postop left hip arthroplasty with associated beam hardening artifact at the pelvis.  Degenerative change at the lumbar spine.  Impression: 1. Pulmonary emboli at the right lower  "lobe.  See separately dictated report for CT chest.  2. Stomach is poorly distended which limits evaluation.  There is suggestion of wall thickening at the gastric antrum.  Correlate for gastritis.  3. The preliminary and final reports are concordant.    Electronically signed by: Deisy Valerio  Date:    10/30/2024  Time:    07:23  CTA Chest Non-Coronary (PE Studies)  Narrative: EXAMINATION:  CTA CHEST NON CORONARY (PE STUDIES)    CLINICAL HISTORY:  Pulmonary embolism (PE) suspected, unknown D-dimer;    TECHNIQUE:  Helically acquired images with axial, sagittal and coronal reformations were obtained from the thoracic inlet to the lung bases followingthe IV administration of contrast.  CTA timed for evaluation of the pulmonary arteries.  MIP images were performed.    Automated tube current modulation, weight-based exposure dosing, and/or iterative reconstruction technique utilized to reach lowest reasonably achievable exposure rate.    DLP: 268 mGy*cm    COMPARISON:  CT chest 01/30/2024    FINDINGS:  BASE OF NECK: No significant abnormality.    AORTA: Aortic atherosclerosis.    PULMONARY VASCULATURE: There are filling defects at right lower lobe segmental branches.    HEART: Normal size. No effusion. No evidence of right heart strain.    VICKY/MEDIASTINUM: No enlarged lymph nodes by size criteria.    AIRWAYS: Patent.    LUNGS/PLEURA: Small right middle lobe nodule unchanged.  No imaging follow-up necessary.  Dependent atelectasis.    UPPER ABDOMEN: See separate report for CT of the abdomen and pelvis.    THORACIC SOFT TISSUES: Unremarkable.    BONES: No acute fracture. No suspicious lytic or sclerotic lesions.  Impression: 1. Right lower lobe segmental emboli without evidence of right heart strain  2. The preliminary and final reports are concordant.  Per Dr. Phan's preliminary report: "Notifications: The results were discussed with the emergency room physician Dr. Styles prior to dictation at 10/30/2024 at " "02:12 hours."    Electronically signed by: Deisy Valerio  Date:    10/30/2024  Time:    07:26        ASSESSMENT & PLAN:   Assessment:   Right lower lobe pulmonary emboli  Melena?  Normocytic anemia, stable- 11/34.6  History of fibromyalgia, rheumatoid arthritis, GERD, and dementia      Plan:  Heparin gtt  Echo   Cardiac monitoring   Occult stool - if positive consider GI consult  Close H&H monitoring  Resume home medications as deemed appropriate once medication reconciliation is updated  Labs in AM    VTE Prophylaxis:  Heparin    Discharge Planning and Disposition: TBD    Doroteo HOUSER PA-C have reviewed and discussed the case with John Paul MD   Please see the attending MD's addendum for further assessment and plan.    Doroteo Jacobs PA-C  Department of Hospital Medicine   [facility]   10/30/2024    This note was created with the assistance of Mire[employer] voice recognition software. There may be transcription errors as a result of using this technology, however minimal. Effort has been made to assure accuracy of transcription, but any obvious errors or omissions should be clarified with the author of the document.    _______________________________________________________________________________  MD Addendum:  Dr. MIK , assumed care of this patient today at --am/pm  For the patient encounter, I performed the substantive portion of the visit, I reviewed the NP/PA documentation, treatment plan, and medical decision making.  I had face to face time with this patient     A. History:    B. Physical exam:    C. Medical decision making:      All diagnosis and differential diagnosis have been reviewed; assessment and plan has been documented; I have personally reviewed the labs and test results that are presently available; I have reviewed the patients medication list; I have reviewed the consulting providers response and recommendations. I have reviewed or attempted to review medical " records based upon their availability.    All of the patient and family questions have been addressed and answered. Patient's is agreeable to the above stated plan. I will continue to monitor closely and make adjustments to medical management as needed.      10/30/2024

## 2024-10-31 VITALS
SYSTOLIC BLOOD PRESSURE: 106 MMHG | WEIGHT: 166 LBS | RESPIRATION RATE: 16 BRPM | BODY MASS INDEX: 27.66 KG/M2 | TEMPERATURE: 98 F | OXYGEN SATURATION: 97 % | HEIGHT: 65 IN | HEART RATE: 78 BPM | DIASTOLIC BLOOD PRESSURE: 72 MMHG

## 2024-10-31 PROBLEM — I26.99 ACUTE PULMONARY EMBOLISM: Status: ACTIVE | Noted: 2024-10-31

## 2024-10-31 LAB
APTT PPP: 182.2 SECONDS (ref 23.2–33.7)
APTT PPP: >200 SECONDS (ref 23.2–33.7)
BASOPHILS # BLD AUTO: 0.01 X10(3)/MCL
BASOPHILS NFR BLD AUTO: 0.2 %
EOSINOPHIL # BLD AUTO: 0.2 X10(3)/MCL (ref 0–0.9)
EOSINOPHIL NFR BLD AUTO: 4.2 %
ERYTHROCYTE [DISTWIDTH] IN BLOOD BY AUTOMATED COUNT: 13.8 % (ref 11.5–17)
HCT VFR BLD AUTO: 32.2 % (ref 37–47)
HGB BLD-MCNC: 10.2 G/DL (ref 12–16)
IMM GRANULOCYTES # BLD AUTO: 0.01 X10(3)/MCL (ref 0–0.04)
IMM GRANULOCYTES NFR BLD AUTO: 0.2 %
LYMPHOCYTES # BLD AUTO: 1.26 X10(3)/MCL (ref 0.6–4.6)
LYMPHOCYTES NFR BLD AUTO: 26.7 %
MCH RBC QN AUTO: 30.2 PG (ref 27–31)
MCHC RBC AUTO-ENTMCNC: 31.7 G/DL (ref 33–36)
MCV RBC AUTO: 95.3 FL (ref 80–94)
MONOCYTES # BLD AUTO: 0.3 X10(3)/MCL (ref 0.1–1.3)
MONOCYTES NFR BLD AUTO: 6.4 %
NEUTROPHILS # BLD AUTO: 2.94 X10(3)/MCL (ref 2.1–9.2)
NEUTROPHILS NFR BLD AUTO: 62.3 %
NRBC BLD AUTO-RTO: 0 %
PLATELET # BLD AUTO: 127 X10(3)/MCL (ref 130–400)
PMV BLD AUTO: 12.2 FL (ref 7.4–10.4)
RBC # BLD AUTO: 3.38 X10(6)/MCL (ref 4.2–5.4)
WBC # BLD AUTO: 4.72 X10(3)/MCL (ref 4.5–11.5)

## 2024-10-31 PROCEDURE — 25000003 PHARM REV CODE 250: Performed by: NURSE PRACTITIONER

## 2024-10-31 PROCEDURE — 85730 THROMBOPLASTIN TIME PARTIAL: CPT | Performed by: INTERNAL MEDICINE

## 2024-10-31 PROCEDURE — G0378 HOSPITAL OBSERVATION PER HR: HCPCS

## 2024-10-31 PROCEDURE — 25000003 PHARM REV CODE 250: Performed by: INTERNAL MEDICINE

## 2024-10-31 PROCEDURE — 85025 COMPLETE CBC W/AUTO DIFF WBC: CPT | Performed by: EMERGENCY MEDICINE

## 2024-10-31 PROCEDURE — 36415 COLL VENOUS BLD VENIPUNCTURE: CPT | Performed by: INTERNAL MEDICINE

## 2024-10-31 PROCEDURE — 63600175 PHARM REV CODE 636 W HCPCS: Performed by: EMERGENCY MEDICINE

## 2024-10-31 PROCEDURE — 36415 COLL VENOUS BLD VENIPUNCTURE: CPT | Performed by: EMERGENCY MEDICINE

## 2024-10-31 PROCEDURE — 63600175 PHARM REV CODE 636 W HCPCS: Performed by: INTERNAL MEDICINE

## 2024-10-31 RX ORDER — PANTOPRAZOLE SODIUM 40 MG/1
40 TABLET, DELAYED RELEASE ORAL DAILY
Status: DISCONTINUED | OUTPATIENT
Start: 2024-11-01 | End: 2024-10-31 | Stop reason: HOSPADM

## 2024-10-31 RX ORDER — PANTOPRAZOLE SODIUM 40 MG/1
40 TABLET, DELAYED RELEASE ORAL DAILY
Qty: 90 TABLET | Refills: 3 | Status: SHIPPED | OUTPATIENT
Start: 2024-11-01 | End: 2025-11-01

## 2024-10-31 RX ADMIN — LEFLUNOMIDE 20 MG: 20 TABLET, FILM COATED ORAL at 09:10

## 2024-10-31 RX ADMIN — QUETIAPINE FUMARATE 25 MG: 25 TABLET ORAL at 09:10

## 2024-10-31 RX ADMIN — PREGABALIN 25 MG: 25 CAPSULE ORAL at 09:10

## 2024-10-31 RX ADMIN — HYDROXYCHLOROQUINE SULFATE 200 MG: 200 TABLET ORAL at 09:10

## 2024-10-31 RX ADMIN — PANTOPRAZOLE SODIUM 40 MG: 40 INJECTION, POWDER, FOR SOLUTION INTRAVENOUS at 09:10

## 2024-10-31 RX ADMIN — DONEPEZIL HYDROCHLORIDE 10 MG: 5 TABLET, FILM COATED ORAL at 09:10

## 2024-10-31 RX ADMIN — HEPARIN SODIUM 14.93 UNITS/KG/HR: 10000 INJECTION, SOLUTION INTRAVENOUS at 02:10

## 2024-10-31 RX ADMIN — MEMANTINE 10 MG: 5 TABLET ORAL at 09:10

## 2024-10-31 RX ADMIN — DIVALPROEX SODIUM 250 MG: 250 TABLET, EXTENDED RELEASE ORAL at 09:10

## 2024-10-31 NOTE — PLAN OF CARE
Spoke with Daria Alex at [facility]. She informed that the patient will be able to transport by facility's van.     Discharge docs sent to facility via careport; Discharge packet prepared and given to RN

## 2024-10-31 NOTE — PROGRESS NOTES
[facility]  Hospital Medicine Progress Note        Chief Complaint: Inpatient Follow-up for PE    HPI:   Gisele Meraz is a 69 y.o. female with a past medical history of fibromyalgia, rheumatoid arthritis, GERD, and dementia who presented to [facility] on 10/29/2024 via EMS from [facility] for abdominal pain.  Nursing home reported patient was complaining of abdominal pain in addition to nausea, vomiting, and dark colored diarrhea.  Of note history is limited secondary to dementia, therefore majority of the history was obtained upon review of the medical record.  Initial vital signs in ED were /73, pulse 87, respirations 20, temperature 36.7° C, and SpO2 97% on room air.  Labs revealed WBC 7.16, RBC 3.98, hemoglobin 12, hematocrit 37.4, MCV 94, platelets 162, sodium 140, potassium 4.2, chloride 106, CO2 25, glucose 120, BUN 26.9, creatinine 0.84, and D-dimer 2.16.  CTA chest revealed right lower lobe segmental emboli without evidence of right heart strain.  CT abdomen and pelvis with IV contrast revealed pulmonary emboli of the right lower lobe, stomach poorly distended which limits evaluation with suggestion of wall thickening of the gastrium antrum-correlate for gastritis.  Patient was started on heparin drip in ED. Patient was admitted to hospital medicine service for further medical management.     Patient was stable and asymptomatic. Had no fever, chest pain or SOB. She has dementia but in a good mood. H&H was stable. She was on po PPI. She was tolerating po diet  IV heparin drip was stopped.   She was placed on po eliquis 10 mg BID x 7 days and then 5 mg BID. Unable to find the etiology of her PE. US legs done and awaiting results. Even if it shows DVT she will have to continue life long anticoagulation.     Interval Hx:   Patient today awake and comfortable. Denies any chest pain, fever or chills. She is eating well. Oriented only to self. But following all commands.      No family at bedside.     Case was discussed with patient's nurse and  on the floor.    Objective/physical exam:  General: In no acute distress  Chest: Clear to auscultation bilaterally  Heart: RRR, +S1, S2, no appreciable murmur  Abdomen: Soft, nontender, BS +  MSK: Warm, no lower extremity edema, no clubbing or cyanosis  Neurologic: Cranial nerve II-XII intact, Strength 5/5 in all 4 extremities    VITAL SIGNS: 24 HRS MIN & MAX LAST   Temp  Min: 97.4 °F (36.3 °C)  Max: 98.1 °F (36.7 °C) 97.8 °F (36.6 °C)   BP  Min: 108/70  Max: 158/82 119/77   Pulse  Min: 67  Max: 82  74   Resp  Min: 14  Max: 18 16   SpO2  Min: 94 %  Max: 99 % 96 %     I have reviewed the following labs:  Recent Labs   Lab 10/30/24  0119 10/30/24  0503 10/30/24  1038 10/31/24  0456   WBC 7.16 8.18  --  4.72   RBC 3.98* 3.61*  --  3.38*   HGB 12.0 11.0* 11.0* 10.2*   HCT 37.4 34.2* 34.6* 32.2*   MCV 94.0 94.7*  --  95.3*   MCH 30.2 30.5  --  30.2   MCHC 32.1* 32.2*  --  31.7*   RDW 13.8 13.6  --  13.8    144  --  127*   MPV 12.2* 12.3*  --  12.2*     Recent Labs   Lab 10/30/24  0119      K 4.2      CO2 25   BUN 26.9*   CREATININE 0.84   CALCIUM 8.7   ALBUMIN 3.2*   ALKPHOS 92   ALT 17   AST 19   BILITOT 0.5     Microbiology Results (last 7 days)       ** No results found for the last 168 hours. **             See below for Radiology    Assessment/Plan:  Right lower lobe pulmonary emboli: Asymptomatic   Vomiting resolved ? Gastritis   Normocytic anemia, stable    History of fibromyalgia, rheumatoid arthritis, GERD, and dementia      Plan:  Patient looks very comfortable.   Has no GI issues today. She is eating well  Lungs clear and O2 sats stable 98% on RA   Hb stable 10.2 today. Will dc iv heparin drip  No sings of GI blood loss. Will start on Eliquis 10 mg BID x 7 days and then 5 mg BID, will need life long anticoagulation   US legs ordered, will f/u     Current meds reviewed     Dc back to NH later today if  stable.     VTE prophylaxis: Eliquis     Patient condition:  Fair    Anticipated discharge and Disposition:   [facility]      All diagnosis and differential diagnosis have been reviewed; assessment and plan has been documented; I have personally reviewed the labs and test results that are presently available; I have reviewed the patients medication list; I have reviewed the consulting providers response and recommendations. I have reviewed or attempted to review medical records based upon their availability    All of the patient's questions have been  addressed and answered. Patient's is agreeable to the above stated plan. I will continue to monitor closely and make adjustments to medical management as needed.    Portions of this note dictated using EMR integrated voice recognition software, and may be subject to voice recognition errors not corrected at proofreading. Please contact writer for clarification if needed.   _____________________________________________________________________    Malnutrition Status:    Scheduled Med:   apixaban  10 mg Oral BID    [START ON 11/7/2024] apixaban  5 mg Oral BID    divalproex  500 mg Oral Nightly    divalproex ER  250 mg Oral Daily    donepeziL  10 mg Oral Daily    hydroxychloroquine  200 mg Oral BID    leflunomide  20 mg Oral Daily    memantine  10 mg Oral BID    methocarbamoL  1,000 mg Oral Nightly    [START ON 11/1/2024] pantoprazole  40 mg Oral Daily    pregabalin  25 mg Oral BID    QUEtiapine  25 mg Oral BID    umeclidinium-vilanteroL  1 puff Inhalation Daily      Continuous Infusions:     PRN Meds:       Radiology:  I have personally reviewed the following imaging and agree with the radiologist.     CT Abdomen Pelvis With IV Contrast NO Oral Contrast  Addendum: Suggest endoscopy to evaluate gastritis and exclude underlying mural  gastric lesion.     Electronically signed by: Deisy Valerio   Date:    10/30/2024   Time:    07:26  Narrative: EXAMINATION:  CT ABDOMEN PELVIS  WITH IV CONTRAST    CLINICAL HISTORY:  Periumbilical abdominal pain diarrhea poor historian;    TECHNIQUE:  Helically acquired images with axial, sagittal and coronal reformations were obtained from the lung bases to the pubic symphysis after the IV administration of contrast.    Automated tube current modulation, weight-based exposure dosing, and/or iterative reconstruction technique utilized to reach lowest reasonably achievable exposure rate.    DLP: 938 mGy*cm    COMPARISON:  No relevant prior available for comparison at the time of dictation.    FINDINGS:  LUNG BASES: Filling defects noted at the right lower lobe pulmonary artery.    LIVER: Too small to characterize hypodensity at the left lobe of the liver, statistically a small cyst or hemangioma in a patient with no known history of malignancy.    BILIARY: No calcified gallstones.    PANCREAS: No inflammatory change.    SPLEEN: Normal in size    ADRENALS: No mass.    KIDNEYS/URETERS: No hydronephrosis.  Renal cortical scarring on the left.    GI TRACT/MESENTERY: The stomach is poorly distended which limits evaluation.  Unable to exclude gastritis.  There is questionable wall thickening at the gastric antrum.  No evidence of bowel obstruction.   Colonic diverticulosis without acute inflammatory change.    PERITONEUM: No free fluid.No free air.    LYMPH NODES: Indeterminate 1.1 cm peripancreatic lymph node.    VASCULATURE: Aortoiliac atherosclerosis.    BLADDER: Normal appearance given degree of distention.    REPRODUCTIVE ORGANS: Uterus is surgically absent.    SOFT TISSUES: Unremarkable.    BONES: Postop left hip arthroplasty with associated beam hardening artifact at the pelvis.  Degenerative change at the lumbar spine.  Impression: 1. Pulmonary emboli at the right lower lobe.  See separately dictated report for CT chest.  2. Stomach is poorly distended which limits evaluation.  There is suggestion of wall thickening at the gastric antrum.  Correlate for  "gastritis.  3. The preliminary and final reports are concordant.    Electronically signed by: Deisy Valerio  Date:    10/30/2024  Time:    07:23  CTA Chest Non-Coronary (PE Studies)  Narrative: EXAMINATION:  CTA CHEST NON CORONARY (PE STUDIES)    CLINICAL HISTORY:  Pulmonary embolism (PE) suspected, unknown D-dimer;    TECHNIQUE:  Helically acquired images with axial, sagittal and coronal reformations were obtained from the thoracic inlet to the lung bases followingthe IV administration of contrast.  CTA timed for evaluation of the pulmonary arteries.  MIP images were performed.    Automated tube current modulation, weight-based exposure dosing, and/or iterative reconstruction technique utilized to reach lowest reasonably achievable exposure rate.    DLP: 268 mGy*cm    COMPARISON:  CT chest 01/30/2024    FINDINGS:  BASE OF NECK: No significant abnormality.    AORTA: Aortic atherosclerosis.    PULMONARY VASCULATURE: There are filling defects at right lower lobe segmental branches.    HEART: Normal size. No effusion. No evidence of right heart strain.    VICKY/MEDIASTINUM: No enlarged lymph nodes by size criteria.    AIRWAYS: Patent.    LUNGS/PLEURA: Small right middle lobe nodule unchanged.  No imaging follow-up necessary.  Dependent atelectasis.    UPPER ABDOMEN: See separate report for CT of the abdomen and pelvis.    THORACIC SOFT TISSUES: Unremarkable.    BONES: No acute fracture. No suspicious lytic or sclerotic lesions.  Impression: 1. Right lower lobe segmental emboli without evidence of right heart strain  2. The preliminary and final reports are concordant.  Per Dr. Phan's preliminary report: "Notifications: The results were discussed with the emergency room physician Dr. Styles prior to dictation at 10/30/2024 at 02:12 hours."    Electronically signed by: Deisy Valerio  Date:    10/30/2024  Time:    07:26      Tapan Fried MD  Department of Hospital Medicine   [facility] " [facility]   10/31/2024

## 2024-10-31 NOTE — PLAN OF CARE
Pt is returning to [facility]. She will transport per [facility] home van.I spoke with Kasia QUILES at [facility].  She rec. All clinicals sent by [facility]

## 2024-11-01 ENCOUNTER — LAB REQUISITION (OUTPATIENT)
Dept: LAB | Facility: HOSPITAL | Age: 69
End: 2024-11-01
Payer: MEDICARE

## 2024-11-01 DIAGNOSIS — D64.9 ANEMIA, UNSPECIFIED: ICD-10-CM

## 2024-11-01 LAB
ALBUMIN SERPL-MCNC: 2.9 G/DL (ref 3.4–4.8)
ALBUMIN/GLOB SERPL: 1 RATIO (ref 1.1–2)
ALP SERPL-CCNC: 84 UNIT/L (ref 40–150)
ALT SERPL-CCNC: 15 UNIT/L (ref 0–55)
ANION GAP SERPL CALC-SCNC: 10 MEQ/L
AST SERPL-CCNC: 15 UNIT/L (ref 5–34)
BASOPHILS # BLD AUTO: 0.01 X10(3)/MCL
BASOPHILS NFR BLD AUTO: 0.2 %
BILIRUB SERPL-MCNC: 0.4 MG/DL
BUN SERPL-MCNC: 19.3 MG/DL (ref 9.8–20.1)
CALCIUM SERPL-MCNC: 8.6 MG/DL (ref 8.4–10.2)
CHLORIDE SERPL-SCNC: 106 MMOL/L (ref 98–107)
CHOLEST SERPL-MCNC: 149 MG/DL
CHOLEST/HDLC SERPL: 3 {RATIO} (ref 0–5)
CO2 SERPL-SCNC: 27 MMOL/L (ref 23–31)
CREAT SERPL-MCNC: 0.78 MG/DL (ref 0.55–1.02)
CREAT/UREA NIT SERPL: 25
EOSINOPHIL # BLD AUTO: 0.32 X10(3)/MCL (ref 0–0.9)
EOSINOPHIL NFR BLD AUTO: 5.5 %
ERYTHROCYTE [DISTWIDTH] IN BLOOD BY AUTOMATED COUNT: 13.6 % (ref 11.5–17)
GFR SERPLBLD CREATININE-BSD FMLA CKD-EPI: >60 ML/MIN/1.73/M2
GLOBULIN SER-MCNC: 2.9 GM/DL (ref 2.4–3.5)
GLUCOSE SERPL-MCNC: 89 MG/DL (ref 82–115)
HCT VFR BLD AUTO: 38.4 % (ref 37–47)
HDLC SERPL-MCNC: 44 MG/DL (ref 35–60)
HGB BLD-MCNC: 12 G/DL (ref 12–16)
IMM GRANULOCYTES # BLD AUTO: 0.03 X10(3)/MCL (ref 0–0.04)
IMM GRANULOCYTES NFR BLD AUTO: 0.5 %
LDLC SERPL CALC-MCNC: 80 MG/DL (ref 50–140)
LYMPHOCYTES # BLD AUTO: 0.68 X10(3)/MCL (ref 0.6–4.6)
LYMPHOCYTES NFR BLD AUTO: 11.7 %
MCH RBC QN AUTO: 30.2 PG (ref 27–31)
MCHC RBC AUTO-ENTMCNC: 31.3 G/DL (ref 33–36)
MCV RBC AUTO: 96.7 FL (ref 80–94)
MONOCYTES # BLD AUTO: 0.49 X10(3)/MCL (ref 0.1–1.3)
MONOCYTES NFR BLD AUTO: 8.4 %
NEUTROPHILS # BLD AUTO: 4.29 X10(3)/MCL (ref 2.1–9.2)
NEUTROPHILS NFR BLD AUTO: 73.7 %
NRBC BLD AUTO-RTO: 0 %
PLATELET # BLD AUTO: 154 X10(3)/MCL (ref 130–400)
PMV BLD AUTO: 12.3 FL (ref 7.4–10.4)
POTASSIUM SERPL-SCNC: 3.6 MMOL/L (ref 3.5–5.1)
PROT SERPL-MCNC: 5.8 GM/DL (ref 5.8–7.6)
RBC # BLD AUTO: 3.97 X10(6)/MCL (ref 4.2–5.4)
SODIUM SERPL-SCNC: 143 MMOL/L (ref 136–145)
TRIGL SERPL-MCNC: 127 MG/DL (ref 37–140)
TSH SERPL-ACNC: 2.01 UIU/ML (ref 0.35–4.94)
VALPROATE SERPL-MCNC: 27.7 UG/ML (ref 50–100)
VLDLC SERPL CALC-MCNC: 25 MG/DL
WBC # BLD AUTO: 5.82 X10(3)/MCL (ref 4.5–11.5)

## 2024-11-01 PROCEDURE — 80061 LIPID PANEL: CPT | Performed by: FAMILY MEDICINE

## 2024-11-01 PROCEDURE — 84443 ASSAY THYROID STIM HORMONE: CPT | Performed by: FAMILY MEDICINE

## 2024-11-01 PROCEDURE — 85025 COMPLETE CBC W/AUTO DIFF WBC: CPT | Performed by: FAMILY MEDICINE

## 2024-11-01 PROCEDURE — 80164 ASSAY DIPROPYLACETIC ACD TOT: CPT | Performed by: FAMILY MEDICINE

## 2024-11-01 PROCEDURE — 80053 COMPREHEN METABOLIC PANEL: CPT | Performed by: FAMILY MEDICINE

## 2024-11-04 NOTE — DISCHARGE SUMMARY
HPI:   Gisele Meraz is a 69 y.o. female with a past medical history of fibromyalgia, rheumatoid arthritis, GERD, and dementia who presented to [facility] on 10/29/2024 via EMS from [facility] for abdominal pain.  Nursing home reported patient was complaining of abdominal pain in addition to nausea, vomiting, and dark colored diarrhea.  Of note history is limited secondary to dementia, therefore majority of the history was obtained upon review of the medical record.  Initial vital signs in ED were /73, pulse 87, respirations 20, temperature 36.7° C, and SpO2 97% on room air.  Labs revealed WBC 7.16, RBC 3.98, hemoglobin 12, hematocrit 37.4, MCV 94, platelets 162, sodium 140, potassium 4.2, chloride 106, CO2 25, glucose 120, BUN 26.9, creatinine 0.84, and D-dimer 2.16.  CTA chest revealed right lower lobe segmental emboli without evidence of right heart strain.  CT abdomen and pelvis with IV contrast revealed pulmonary emboli of the right lower lobe, stomach poorly distended which limits evaluation with suggestion of wall thickening of the gastrium antrum-correlate for gastritis.  Patient was started on heparin drip in ED. Patient was admitted to hospital medicine service for further medical management.      Patient was stable and asymptomatic. Had no fever, chest pain or SOB. She has dementia but in a good mood. H&H was stable. She was on po PPI. She was tolerating po diet  IV heparin drip was stopped.   She was placed on po eliquis 10 mg BID x 7 days and then 5 mg BID. Unable to find the etiology of her PE. US legs done and awaiting results. Even if it shows DVT she will have to continue life long anticoagulation.      Interval Hx:   Patient today awake and comfortable. Denies any chest pain, fever or chills. She is eating well. Oriented only to self. But following all commands.      No family at bedside.      Case was discussed with patient's nurse and  on the floor.     Objective/physical  exam:  General: In no acute distress  Chest: Clear to auscultation bilaterally  Heart: RRR, +S1, S2, no appreciable murmur  Abdomen: Soft, nontender, BS +  MSK: Warm, no lower extremity edema, no clubbing or cyanosis  Neurologic: Cranial nerve II-XII intact, Strength 5/5 in all 4 extremities     VITAL SIGNS: 24 HRS MIN & MAX LAST   Temp  Min: 97.4 °F (36.3 °C)  Max: 98.1 °F (36.7 °C) 97.8 °F (36.6 °C)   BP  Min: 108/70  Max: 158/82 119/77   Pulse  Min: 67  Max: 82  74   Resp  Min: 14  Max: 18 16   SpO2  Min: 94 %  Max: 99 % 96 %      I have reviewed the following labs:         Recent Labs   Lab 10/30/24  0119 10/30/24  0503 10/30/24  1038 10/31/24  0456   WBC 7.16 8.18  --  4.72   RBC 3.98* 3.61*  --  3.38*   HGB 12.0 11.0* 11.0* 10.2*   HCT 37.4 34.2* 34.6* 32.2*   MCV 94.0 94.7*  --  95.3*   MCH 30.2 30.5  --  30.2   MCHC 32.1* 32.2*  --  31.7*   RDW 13.8 13.6  --  13.8    144  --  127*   MPV 12.2* 12.3*  --  12.2*          Recent Labs   Lab 10/30/24  0119      K 4.2      CO2 25   BUN 26.9*   CREATININE 0.84   CALCIUM 8.7   ALBUMIN 3.2*   ALKPHOS 92   ALT 17   AST 19   BILITOT 0.5      Microbiology Results (last 7 days)         ** No results found for the last 168 hours. **                See below for Radiology     Assessment/Plan:  Right lower lobe pulmonary emboli: Asymptomatic   Vomiting resolved ? Gastritis   Normocytic anemia, stable     History of fibromyalgia, rheumatoid arthritis, GERD, and dementia        Plan:  Patient looks very comfortable.   Has no GI issues today. She is eating well  Lungs clear and O2 sats stable 98% on RA   Hb stable 10.2 today. Will dc iv heparin drip  No sings of GI blood loss. Will start on Eliquis 10 mg BID x 7 days and then 5 mg BID, will need life long anticoagulation   US legs ordered, will f/u      Current meds reviewed      Dc back to NH later today if stable.      VTE prophylaxis: Eliquis      Patient condition:  Fair     Anticipated discharge and  Disposition:   [facility]        All diagnosis and differential diagnosis have been reviewed; assessment and plan has been documented; I have personally reviewed the labs and test results that are presently available; I have reviewed the patients medication list; I have reviewed the consulting providers response and recommendations. I have reviewed or attempted to review medical records based upon their availability     All of the patient's questions have been  addressed and answered. Patient's is agreeable to the above stated plan. I will continue to monitor closely and make adjustments to medical management as needed.     Portions of this note dictated using EMR integrated voice recognition software, and may be subject to voice recognition errors not corrected at proofreading. Please contact writer for clarification if needed.   _____________________________________________________________________     Malnutrition Status:     Scheduled Med:   apixaban  10 mg Oral BID    [START ON 11/7/2024] apixaban  5 mg Oral BID    divalproex  500 mg Oral Nightly    divalproex ER  250 mg Oral Daily    donepeziL  10 mg Oral Daily    hydroxychloroquine  200 mg Oral BID    leflunomide  20 mg Oral Daily    memantine  10 mg Oral BID    methocarbamoL  1,000 mg Oral Nightly    [START ON 11/1/2024] pantoprazole  40 mg Oral Daily    pregabalin  25 mg Oral BID    QUEtiapine  25 mg Oral BID    umeclidinium-vilanteroL  1 puff Inhalation Daily      Continuous Infusions:     PRN Meds:        Radiology:  I have personally reviewed the following imaging and agree with the radiologist.      CT Abdomen Pelvis With IV Contrast NO Oral Contrast  Addendum: Suggest endoscopy to evaluate gastritis and exclude underlying mural  gastric lesion.      Electronically signed by:               Deisy Valerio   Date:                                                 10/30/2024   Time:                                                 07:26  Narrative: EXAMINATION:  CT  ABDOMEN PELVIS WITH IV CONTRAST     CLINICAL HISTORY:  Periumbilical abdominal pain diarrhea poor historian;     TECHNIQUE:  Helically acquired images with axial, sagittal and coronal reformations were obtained from the lung bases to the pubic symphysis after the IV administration of contrast.     Automated tube current modulation, weight-based exposure dosing, and/or iterative reconstruction technique utilized to reach lowest reasonably achievable exposure rate.     DLP: 938 mGy*cm     COMPARISON:  No relevant prior available for comparison at the time of dictation.     FINDINGS:  LUNG BASES: Filling defects noted at the right lower lobe pulmonary artery.     LIVER: Too small to characterize hypodensity at the left lobe of the liver, statistically a small cyst or hemangioma in a patient with no known history of malignancy.     BILIARY: No calcified gallstones.     PANCREAS: No inflammatory change.     SPLEEN: Normal in size     ADRENALS: No mass.     KIDNEYS/URETERS: No hydronephrosis.  Renal cortical scarring on the left.     GI TRACT/MESENTERY: The stomach is poorly distended which limits evaluation.  Unable to exclude gastritis.  There is questionable wall thickening at the gastric antrum.  No evidence of bowel obstruction.   Colonic diverticulosis without acute inflammatory change.     PERITONEUM: No free fluid.No free air.     LYMPH NODES: Indeterminate 1.1 cm peripancreatic lymph node.     VASCULATURE: Aortoiliac atherosclerosis.     BLADDER: Normal appearance given degree of distention.     REPRODUCTIVE ORGANS: Uterus is surgically absent.     SOFT TISSUES: Unremarkable.     BONES: Postop left hip arthroplasty with associated beam hardening artifact at the pelvis.  Degenerative change at the lumbar spine.  Impression: 1. Pulmonary emboli at the right lower lobe.  See separately dictated report for CT chest.  2. Stomach is poorly distended which limits evaluation.  There is suggestion of wall thickening at  "the gastric antrum.  Correlate for gastritis.  3. The preliminary and final reports are concordant.     Electronically signed by:Deisy Valerio  Date:                                                  10/30/2024  Time:                                                  07:23  CTA Chest Non-Coronary (PE Studies)  Narrative: EXAMINATION:  CTA CHEST NON CORONARY (PE STUDIES)     CLINICAL HISTORY:  Pulmonary embolism (PE) suspected, unknown D-dimer;     TECHNIQUE:  Helically acquired images with axial, sagittal and coronal reformations were obtained from the thoracic inlet to the lung bases followingthe IV administration of contrast.  CTA timed for evaluation of the pulmonary arteries.  MIP images were performed.     Automated tube current modulation, weight-based exposure dosing, and/or iterative reconstruction technique utilized to reach lowest reasonably achievable exposure rate.     DLP: 268 mGy*cm     COMPARISON:  CT chest 01/30/2024     FINDINGS:  BASE OF NECK: No significant abnormality.     AORTA: Aortic atherosclerosis.     PULMONARY VASCULATURE: There are filling defects at right lower lobe segmental branches.     HEART: Normal size. No effusion. No evidence of right heart strain.     VICKY/MEDIASTINUM: No enlarged lymph nodes by size criteria.     AIRWAYS: Patent.     LUNGS/PLEURA: Small right middle lobe nodule unchanged.  No imaging follow-up necessary.  Dependent atelectasis.     UPPER ABDOMEN: See separate report for CT of the abdomen and pelvis.     THORACIC SOFT TISSUES: Unremarkable.     BONES: No acute fracture. No suspicious lytic or sclerotic lesions.  Impression: 1. Right lower lobe segmental emboli without evidence of right heart strain  2. The preliminary and final reports are concordant.  Per Dr. Phan's preliminary report: "Notifications: The results were discussed with the emergency room physician Dr. Styles prior to dictation at 10/30/2024 at 02:12 hours."     Electronically signed " by:Deisy Valerio  Date:                                                  10/30/2024  Time:                                                  07:26    Tapan Fried MD  Department of Hospital Medicine   [facility]    Dc to NH today     Dc 31 minutes

## 2024-12-29 ENCOUNTER — HOSPITAL ENCOUNTER (EMERGENCY)
Facility: HOSPITAL | Age: 69
Discharge: HOME | End: 2024-12-29
Attending: STUDENT IN AN ORGANIZED HEALTH CARE EDUCATION/TRAINING PROGRAM
Payer: MEDICARE

## 2024-12-29 VITALS
BODY MASS INDEX: 26.66 KG/M2 | OXYGEN SATURATION: 97 % | RESPIRATION RATE: 14 BRPM | DIASTOLIC BLOOD PRESSURE: 85 MMHG | HEIGHT: 65 IN | HEART RATE: 80 BPM | SYSTOLIC BLOOD PRESSURE: 167 MMHG | WEIGHT: 160 LBS | TEMPERATURE: 99 F

## 2024-12-29 DIAGNOSIS — R41.82 ALTERED MENTAL STATUS, UNSPECIFIED ALTERED MENTAL STATUS TYPE: ICD-10-CM

## 2024-12-29 DIAGNOSIS — N39.0 URINARY TRACT INFECTION WITHOUT HEMATURIA, SITE UNSPECIFIED: Primary | ICD-10-CM

## 2024-12-29 LAB
ALBUMIN SERPL-MCNC: 3.2 G/DL (ref 3.4–4.8)
ALBUMIN/GLOB SERPL: 1.3 RATIO (ref 1.1–2)
ALP SERPL-CCNC: 91 UNIT/L (ref 40–150)
ALT SERPL-CCNC: 13 UNIT/L (ref 0–55)
ANION GAP SERPL CALC-SCNC: 12 MEQ/L
AST SERPL-CCNC: 16 UNIT/L (ref 5–34)
BACTERIA #/AREA URNS AUTO: ABNORMAL /HPF
BASOPHILS # BLD AUTO: 0.04 X10(3)/MCL
BASOPHILS NFR BLD AUTO: 0.6 %
BILIRUB SERPL-MCNC: 0.4 MG/DL
BILIRUB UR QL STRIP.AUTO: NEGATIVE
BUN SERPL-MCNC: 16.6 MG/DL (ref 9.8–20.1)
CALCIUM SERPL-MCNC: 8.4 MG/DL (ref 8.4–10.2)
CHLORIDE SERPL-SCNC: 106 MMOL/L (ref 98–107)
CLARITY UR: ABNORMAL
CO2 SERPL-SCNC: 26 MMOL/L (ref 23–31)
COLOR UR AUTO: YELLOW
CREAT SERPL-MCNC: 0.74 MG/DL (ref 0.55–1.02)
CREAT/UREA NIT SERPL: 22
EOSINOPHIL # BLD AUTO: 0.17 X10(3)/MCL (ref 0–0.9)
EOSINOPHIL NFR BLD AUTO: 2.5 %
ERYTHROCYTE [DISTWIDTH] IN BLOOD BY AUTOMATED COUNT: 13 % (ref 11.5–17)
GFR SERPLBLD CREATININE-BSD FMLA CKD-EPI: >60 ML/MIN/1.73/M2
GLOBULIN SER-MCNC: 2.5 GM/DL (ref 2.4–3.5)
GLUCOSE SERPL-MCNC: 120 MG/DL (ref 82–115)
GLUCOSE UR QL STRIP: NORMAL
GROUP & RH: NORMAL
HCT VFR BLD AUTO: 34.5 % (ref 37–47)
HGB BLD-MCNC: 11 G/DL (ref 12–16)
HGB UR QL STRIP: NEGATIVE
IMM GRANULOCYTES # BLD AUTO: 0.01 X10(3)/MCL (ref 0–0.04)
IMM GRANULOCYTES NFR BLD AUTO: 0.1 %
INDIRECT COOMBS: NORMAL
INR PPP: 1.2
KETONES UR QL STRIP: ABNORMAL
LEUKOCYTE ESTERASE UR QL STRIP: 500
LYMPHOCYTES # BLD AUTO: 1.13 X10(3)/MCL (ref 0.6–4.6)
LYMPHOCYTES NFR BLD AUTO: 16.7 %
MAGNESIUM SERPL-MCNC: 2.1 MG/DL (ref 1.6–2.6)
MCH RBC QN AUTO: 30.6 PG (ref 27–31)
MCHC RBC AUTO-ENTMCNC: 31.9 G/DL (ref 33–36)
MCV RBC AUTO: 96.1 FL (ref 80–94)
MONOCYTES # BLD AUTO: 0.49 X10(3)/MCL (ref 0.1–1.3)
MONOCYTES NFR BLD AUTO: 7.2 %
MUCOUS THREADS URNS QL MICRO: ABNORMAL /LPF
NEUTROPHILS # BLD AUTO: 4.92 X10(3)/MCL (ref 2.1–9.2)
NEUTROPHILS NFR BLD AUTO: 72.9 %
NITRITE UR QL STRIP: ABNORMAL
NRBC BLD AUTO-RTO: 0 %
PH UR STRIP: 6.5 [PH]
PLATELET # BLD AUTO: 125 X10(3)/MCL (ref 130–400)
PMV BLD AUTO: 12.1 FL (ref 7.4–10.4)
POTASSIUM SERPL-SCNC: 3.8 MMOL/L (ref 3.5–5.1)
PROT SERPL-MCNC: 5.7 GM/DL (ref 5.8–7.6)
PROT UR QL STRIP: ABNORMAL
PROTHROMBIN TIME: 15.1 SECONDS (ref 12.5–14.5)
RBC # BLD AUTO: 3.59 X10(6)/MCL (ref 4.2–5.4)
RBC #/AREA URNS AUTO: ABNORMAL /HPF
SODIUM SERPL-SCNC: 144 MMOL/L (ref 136–145)
SP GR UR STRIP.AUTO: 1.03 (ref 1–1.03)
SPECIMEN OUTDATE: NORMAL
SQUAMOUS #/AREA URNS LPF: ABNORMAL /HPF
UROBILINOGEN UR STRIP-ACNC: NORMAL
WBC # BLD AUTO: 6.76 X10(3)/MCL (ref 4.5–11.5)
WBC #/AREA URNS AUTO: >100 /HPF

## 2024-12-29 PROCEDURE — 99285 EMERGENCY DEPT VISIT HI MDM: CPT | Mod: 25

## 2024-12-29 PROCEDURE — 87077 CULTURE AEROBIC IDENTIFY: CPT | Performed by: STUDENT IN AN ORGANIZED HEALTH CARE EDUCATION/TRAINING PROGRAM

## 2024-12-29 PROCEDURE — 81001 URINALYSIS AUTO W/SCOPE: CPT | Performed by: STUDENT IN AN ORGANIZED HEALTH CARE EDUCATION/TRAINING PROGRAM

## 2024-12-29 PROCEDURE — 25000003 PHARM REV CODE 250: Performed by: STUDENT IN AN ORGANIZED HEALTH CARE EDUCATION/TRAINING PROGRAM

## 2024-12-29 PROCEDURE — 85025 COMPLETE CBC W/AUTO DIFF WBC: CPT | Performed by: STUDENT IN AN ORGANIZED HEALTH CARE EDUCATION/TRAINING PROGRAM

## 2024-12-29 PROCEDURE — 85610 PROTHROMBIN TIME: CPT | Performed by: STUDENT IN AN ORGANIZED HEALTH CARE EDUCATION/TRAINING PROGRAM

## 2024-12-29 PROCEDURE — 80053 COMPREHEN METABOLIC PANEL: CPT | Performed by: STUDENT IN AN ORGANIZED HEALTH CARE EDUCATION/TRAINING PROGRAM

## 2024-12-29 PROCEDURE — 96361 HYDRATE IV INFUSION ADD-ON: CPT

## 2024-12-29 PROCEDURE — 83735 ASSAY OF MAGNESIUM: CPT | Performed by: STUDENT IN AN ORGANIZED HEALTH CARE EDUCATION/TRAINING PROGRAM

## 2024-12-29 PROCEDURE — 63600175 PHARM REV CODE 636 W HCPCS: Performed by: STUDENT IN AN ORGANIZED HEALTH CARE EDUCATION/TRAINING PROGRAM

## 2024-12-29 PROCEDURE — 96374 THER/PROPH/DIAG INJ IV PUSH: CPT

## 2024-12-29 PROCEDURE — 86900 BLOOD TYPING SEROLOGIC ABO: CPT | Performed by: STUDENT IN AN ORGANIZED HEALTH CARE EDUCATION/TRAINING PROGRAM

## 2024-12-29 RX ORDER — SODIUM CHLORIDE 9 MG/ML
1000 INJECTION, SOLUTION INTRAVENOUS
Status: COMPLETED | OUTPATIENT
Start: 2024-12-29 | End: 2024-12-29

## 2024-12-29 RX ORDER — CEFTRIAXONE 2 G/1
1 INJECTION, POWDER, FOR SOLUTION INTRAMUSCULAR; INTRAVENOUS
Status: COMPLETED | OUTPATIENT
Start: 2024-12-29 | End: 2024-12-29

## 2024-12-29 RX ORDER — LEVOFLOXACIN 750 MG/1
750 TABLET ORAL DAILY
Qty: 7 TABLET | Refills: 0 | Status: SHIPPED | OUTPATIENT
Start: 2024-12-29 | End: 2024-12-29 | Stop reason: ALTCHOICE

## 2024-12-29 RX ORDER — CEFDINIR 300 MG/1
300 CAPSULE ORAL 2 TIMES DAILY
Qty: 20 CAPSULE | Refills: 0 | Status: SHIPPED | OUTPATIENT
Start: 2024-12-29 | End: 2025-01-08

## 2024-12-29 RX ADMIN — CEFTRIAXONE SODIUM 1 G: 2 INJECTION, POWDER, FOR SOLUTION INTRAMUSCULAR; INTRAVENOUS at 05:12

## 2024-12-29 RX ADMIN — SODIUM CHLORIDE 1000 ML: 9 INJECTION, SOLUTION INTRAVENOUS at 03:12

## 2024-12-29 NOTE — DISCHARGE INSTRUCTIONS
Thanks for letting use take care of you today! It is our goal to give you courteous care and to keep you comfortable and informed. If you have any questions before you leave I will be happy to try and answer them.     Advice after your visit:  Your visit in the emergency department is NOT definitive care - please follow-up with your primary care doctor and/or specialist within 1-2 days. If you do not have a primary care physician call [phone] to schedule an appointment. Please return if you have any worsening in your condition or if you have any other concerns.    Return to the emergency department if any worsening symptoms including fever, chest pain, difficulty breathing, weakness, numbness, tingling, nausea, vomiting, inability to eat, drink or take your medication, or any other new symptoms or concerns arise.      Please signup for MyChart as noted below in your paperwork to review all labwork, imaging results, and any other incidental findings from today's visit.     If you had radiology exams like an XRAY or CT in the emergency Department the interpreation on them may be preliminary - there may be less time sensitive findings on the reports please obtain these reports within 24 hours from the hospital or by using your out on your mobile phone to access records.  Bring these to your primary care doctor and/or specialist for further review of incidental findings.    Please review any LAB WORK from your visit today with your primary care physician.    If you were prescribed OPIATE PAIN MEDICATION - please understand of these medications can be addictive, you may fill less of the prescription was written for, you do not have to take the full prescription.  You may discard what you do not use.  Please seek help if you feel you are having problems with addiction.  Do not drive or operate heavy machinery if you are taking sedating medications.  Do not mix these medications with alcohol.      If you had a SPLINT  placed in the emergency department if you have severe pain numbness tingling or discoloration of year digits please remove the splint and return to the emergency department for further evaluation as this may represent a sign of compromise to the nerves or blood vessels due to swelling.    If you had SUTURES in the emergency department please have them removed in the prescribed time frame typically within 7-14 days.  You may shower but please do not bathe or swim.  Keep the wounds clean and dry and covered with a clean dressing.  Please return if he have any signs of infection like redness or drainage or pain at the suture site.    Please take the full course of  any ANTIBIOTICS you were prescribed - incomplete courses of antibiotics can cause resistance to antibiotics in the future which will make it difficult to treat any infections you may have.

## 2024-12-29 NOTE — ED NOTES
[facility] called & updated, spoke with DELANEY Pete; Huey Tavarez will arrange transportation via [facility]

## 2024-12-29 NOTE — ED PROVIDER NOTES
"Encounter Date: 2024    SCRIBE #1 NOTE: I, Tasha Mcgrath, am scribing for, and in the presence of,  Jose F Damian IV, MD. I have scribed the following portions of the note - Other sections scribed: HPI, ROS, PE.       History     Chief Complaint   Patient presents with    Altered Mental Status     Pt arrives [facility] via unit 5 from [facility]. Pt baseline GCS 15, but found by staff altered GCS 13. Hx anemia and pale in triage.      The patient is a 69 year old female with hx of dementia, fibromyalgia, anemia, pneumonia, arthritis, and rheumatoid arthritis presenting to the ED via EMS from [facility] due to altered mental status. The patient states that she "doesn't feel well and is very tired". The patient denies abdominal pain.     Per nursing home records - patient is DNR, comfort care only     The history is provided by the patient and medical records. No  was used.     Review of patient's allergies indicates:   Allergen Reactions    Penicillin Hives    Penicillins Swelling     Past Medical History:   Diagnosis Date    Acid reflux     Arthritis     Dementia     Patient on Memantine BID    Fibromyalgia     Pneumonia     RA (rheumatoid arthritis)      Past Surgical History:   Procedure Laterality Date    ADENOIDECTOMY       SECTION      EYE SURGERY      HEMIARTHROPLASTY OF HIP Left 2023    Procedure: HEMIARTHROPLASTY, HIP;  Surgeon: Rodrick Andrew DO;  Location: [facility];  Service: Orthopedics;  Laterality: Left;    HYSTERECTOMY      TONSILLECTOMY       Family History   Problem Relation Name Age of Onset    Dementia Father      Dementia Maternal Grandmother      Dementia Maternal Grandfather       Social History     Tobacco Use    Smoking status: Former     Types: Cigarettes    Smokeless tobacco: Never   Substance Use Topics    Alcohol use: Not Currently    Drug use: Never     Review of Systems   Unable to perform ROS: Mental status change       Physical Exam     Initial " Vitals [12/29/24 1343]   BP Pulse Resp Temp SpO2   (!) 139/55 79 12 98.8 °F (37.1 °C) 98 %      MAP       --         Physical Exam    Nursing note and vitals reviewed.  Constitutional: She is not diaphoretic. She appears ill (Chronically ill appearing.). No distress.   HENT:   Head: Normocephalic and atraumatic. Mouth/Throat: Mucous membranes are dry.   Neck: Neck supple.   Normal range of motion.  Cardiovascular:  Normal rate and regular rhythm.           No murmur heard.  Pulmonary/Chest: Breath sounds normal. No respiratory distress.   Abdominal: Abdomen is soft. She exhibits no distension. There is no abdominal tenderness.   Musculoskeletal:      Cervical back: Normal range of motion and neck supple.     Neurological: She is alert. She has normal strength. No cranial nerve deficit or sensory deficit.   Intermittent disoriented   Skin: Skin is warm. Capillary refill takes less than 2 seconds. There is pallor.   Psychiatric: She has a normal mood and affect.         ED Course   Procedures  Labs Reviewed   COMPREHENSIVE METABOLIC PANEL - Abnormal       Result Value    Sodium 144      Potassium 3.8      Chloride 106      CO2 26      Glucose 120 (*)     Blood Urea Nitrogen 16.6      Creatinine 0.74      Calcium 8.4      Protein Total 5.7 (*)     Albumin 3.2 (*)     Globulin 2.5      Albumin/Globulin Ratio 1.3      Bilirubin Total 0.4      ALP 91      ALT 13      AST 16      eGFR >60      Anion Gap 12.0      BUN/Creatinine Ratio 22     PROTIME-INR - Abnormal    PT 15.1 (*)     INR 1.2     URINALYSIS, REFLEX TO URINE CULTURE - Abnormal    Color, UA Yellow      Appearance, UA Turbid (*)     Specific Gravity, UA 1.029      pH, UA 6.5      Protein, UA 1+ (*)     Glucose, UA Normal      Ketones, UA 2+ (*)     Blood, UA Negative      Bilirubin, UA Negative      Urobilinogen, UA Normal      Nitrites, UA 1+ (*)     Leukocyte Esterase,  (*)     RBC, UA 0-5      WBC, UA >100 (*)     Bacteria, UA Many (*)     Squamous  Epithelial Cells, UA Trace      Mucous, UA Trace (*)    CBC WITH DIFFERENTIAL - Abnormal    WBC 6.76      RBC 3.59 (*)     Hgb 11.0 (*)     Hct 34.5 (*)     MCV 96.1 (*)     MCH 30.6      MCHC 31.9 (*)     RDW 13.0      Platelet 125 (*)     MPV 12.1 (*)     Neut % 72.9      Lymph % 16.7      Mono % 7.2      Eos % 2.5      Basophil % 0.6      Lymph # 1.13      Neut # 4.92      Mono # 0.49      Eos # 0.17      Baso # 0.04      IG# 0.01      IG% 0.1      NRBC% 0.0     MAGNESIUM - Normal    Magnesium Level 2.10     CULTURE, URINE   CBC W/ AUTO DIFFERENTIAL    Narrative:     The following orders were created for panel order CBC auto differential.  Procedure                               Abnormality         Status                     ---------                               -----------         ------                     CBC with Differential[[id number]]       Abnormal            Final result                 Please view results for these tests on the individual orders.   TYPE & SCREEN    Group & Rh O POS      Indirect Fariha GEL NEG      Specimen Outdate 01/01/2025 23:59            Imaging Results              CT Head Without Contrast (Final result)  Result time 12/29/24 14:24:32      Final result by Wood Neville MD (12/29/24 14:24:32)                   Impression:      1.  No acute intracranial findings identified.    2.  Chronic microangiopathic ischemia and atrophy.      Electronically signed by: Wood Neville  Date:    12/29/2024  Time:    14:24               Narrative:    EXAMINATION:  CT HEAD WITHOUT CONTRAST    CLINICAL HISTORY:  Mental status change, unknown cause;    TECHNIQUE:  Sequential axial images were performed of the brain without contrast.    Dose product length of 837 mGycm. Automated exposure control was utilized to minimize radiation dose.    COMPARISON:  January 5, 2023..    FINDINGS:  There is no intracranial mass effect, midline shift, hydrocephalus or hemorrhage. There is no sulcal effacement or  low attenuation changes to suggest recent large vessel territory infarction. Chronic appearing periventricular and subcortical white matter low attenuation changes are present and are consistent with chronic microangiopathic ischemia. The ventricular system and sulcal markings prominence is consistent with atrophy. There is no acute extra axial fluid collection.  There is moderate mucoperiosteal thickening of right maxillary and ethmoidal air cells.  Otherwise, visualized paranasal sinuses are clear without mucosal thickening, polypoidal abnormality or air-fluid levels. Mastoid air cells aeration is optimal.                                       Medications   0.9% NaCl infusion (0 mLs Intravenous Stopped 12/29/24 1640)   cefTRIAXone injection 1 g (1 g Intravenous Given 12/29/24 1743)     Medical Decision Making  70 yo presenting with AMS from nursing home, well appearing, awake and alert on my exam  Workup with UTI, otheriwse negative  Will treat with outpatient abx, discharge back to facility     Differential diagnosis includes but is not limited to  Judging by the patient's chief complaint and pertinent history, the patient has the following possible differential diagnoses, including but not limited to the following.  Some of these are deemed to be lower likelihood and some more likely based on my physical exam and history combined with possible lab work and/or imaging studies.   Please see the pertinent studies, and refer to the HPI.  Some of these diagnoses will take further evaluation to fully rule out, perhaps as an outpatient and the patient was encouraged to follow up when discharged for more comprehensive evaluation.    Metabolic abnormality, intoxication, toxic ingestion, CVA, infection, structural (SAH, ICH, trauma, neoplastic), seizure/postictal, polypharmacy        Problems Addressed:  Altered mental status, unspecified altered mental status type: acute illness or injury that poses a threat to life or  bodily functions  Urinary tract infection without hematuria, site unspecified: acute illness or injury that poses a threat to life or bodily functions    Amount and/or Complexity of Data Reviewed  External Data Reviewed: notes.     Details: Pt is DNR.   Labs: ordered.  Radiology: ordered and independent interpretation performed.     Details: Head CT - no obvious bleed or mass       Risk  Prescription drug management.            Scribe Attestation:   Scribe #1: I performed the above scribed service and the documentation accurately describes the services I performed. I attest to the accuracy of the note.    Attending Attestation:           Physician Attestation for Scribe:  Physician Attestation Statement for Scribe #1: I, Jose F Damian IV, MD, reviewed documentation, as scribed by Tasha Mcgrath in my presence, and it is both accurate and complete.                                    Clinical Impression:  Final diagnoses:  [R41.82] Altered mental status, unspecified altered mental status type  [N39.0] Urinary tract infection without hematuria, site unspecified (Primary)          ED Disposition Condition    Discharge Stable          ED Prescriptions       Medication Sig Dispense Start Date End Date Auth. Provider    levoFLOXacin (LEVAQUIN) 750 MG tablet  (Status: Discontinued) Take 1 tablet (750 mg total) by mouth once daily. for 7 days 7 tablet 12/29/2024 12/29/2024 Jose F Damian IV, MD    cefdinir (OMNICEF) 300 MG capsule Take 1 capsule (300 mg total) by mouth 2 (two) times daily. for 10 days 20 capsule 12/29/2024 1/8/2025 Jose F Damian IV, MD          Follow-up Information       Follow up With Specialties Details Why Contact Info    [facility] - Emergency Dept Emergency Medicine Go to  If symptoms worsen [address] [facility] [id number] [phone]    Primary care physician  Schedule an appointment as soon as possible for a visit   Follow up with you primary care physician.   If  you do not have a primary care physician call [phone] to schedule an appointment.             Jose F Damian IV, MD  12/29/24 [id number]

## 2025-01-02 LAB
BACTERIA UR CULT: ABNORMAL
BACTERIA UR CULT: ABNORMAL

## 2025-02-10 ENCOUNTER — LAB REQUISITION (OUTPATIENT)
Dept: LAB | Facility: HOSPITAL | Age: 70
End: 2025-02-10
Payer: MEDICARE

## 2025-02-10 DIAGNOSIS — D64.9 ANEMIA, UNSPECIFIED: ICD-10-CM

## 2025-02-10 LAB
BASOPHILS # BLD AUTO: 0.04 X10(3)/MCL
BASOPHILS NFR BLD AUTO: 0.8 %
EOSINOPHIL # BLD AUTO: 0.17 X10(3)/MCL (ref 0–0.9)
EOSINOPHIL NFR BLD AUTO: 3.2 %
ERYTHROCYTE [DISTWIDTH] IN BLOOD BY AUTOMATED COUNT: 13.4 % (ref 11.5–17)
HCT VFR BLD AUTO: 29.4 % (ref 37–47)
HGB BLD-MCNC: 9.4 G/DL (ref 12–16)
IMM GRANULOCYTES # BLD AUTO: 0.01 X10(3)/MCL (ref 0–0.04)
IMM GRANULOCYTES NFR BLD AUTO: 0.2 %
LYMPHOCYTES # BLD AUTO: 3.25 X10(3)/MCL (ref 0.6–4.6)
LYMPHOCYTES NFR BLD AUTO: 61.4 %
MCH RBC QN AUTO: 31.2 PG (ref 27–31)
MCHC RBC AUTO-ENTMCNC: 32 G/DL (ref 33–36)
MCV RBC AUTO: 97.7 FL (ref 80–94)
MONOCYTES # BLD AUTO: 0.46 X10(3)/MCL (ref 0.1–1.3)
MONOCYTES NFR BLD AUTO: 8.7 %
NEUTROPHILS # BLD AUTO: 1.36 X10(3)/MCL (ref 2.1–9.2)
NEUTROPHILS NFR BLD AUTO: 25.7 %
NRBC BLD AUTO-RTO: 0 %
PLATELET # BLD AUTO: 127 X10(3)/MCL (ref 130–400)
PMV BLD AUTO: 12.6 FL (ref 7.4–10.4)
RBC # BLD AUTO: 3.01 X10(6)/MCL (ref 4.2–5.4)
VALPROATE SERPL-MCNC: 41.7 UG/ML (ref 50–100)
WBC # BLD AUTO: 5.29 X10(3)/MCL (ref 4.5–11.5)

## 2025-02-10 PROCEDURE — 80164 ASSAY DIPROPYLACETIC ACD TOT: CPT | Performed by: FAMILY MEDICINE

## 2025-02-10 PROCEDURE — 85025 COMPLETE CBC W/AUTO DIFF WBC: CPT | Performed by: FAMILY MEDICINE

## 2025-05-05 ENCOUNTER — LAB REQUISITION (OUTPATIENT)
Dept: LAB | Facility: HOSPITAL | Age: 70
End: 2025-05-05
Payer: MEDICARE

## 2025-05-05 DIAGNOSIS — R89.2 ABNORMAL LEVEL OF OTHER DRUGS, MEDICAMENTS AND BIOLOGICAL SUBSTANCES IN SPECIMENS FROM OTHER ORGANS, SYSTEMS AND TISSUES: ICD-10-CM

## 2025-05-05 LAB — VALPROATE SERPL-MCNC: 23.5 UG/ML (ref 50–100)

## 2025-05-05 PROCEDURE — 80164 ASSAY DIPROPYLACETIC ACD TOT: CPT | Performed by: FAMILY MEDICINE

## 2025-08-05 ENCOUNTER — LAB REQUISITION (OUTPATIENT)
Dept: LAB | Facility: HOSPITAL | Age: 70
End: 2025-08-05
Payer: MEDICARE

## 2025-08-05 DIAGNOSIS — D64.9 ANEMIA, UNSPECIFIED: ICD-10-CM

## 2025-08-05 LAB
ALBUMIN SERPL-MCNC: 2.6 G/DL (ref 3.4–4.8)
ALBUMIN/GLOB SERPL: 1.2 RATIO (ref 1.1–2)
ALP SERPL-CCNC: 74 UNIT/L (ref 40–150)
ALT SERPL-CCNC: 11 UNIT/L (ref 0–55)
ANION GAP SERPL CALC-SCNC: 4 MEQ/L
AST SERPL-CCNC: 14 UNIT/L (ref 11–45)
BASOPHILS # BLD AUTO: 0.02 X10(3)/MCL
BASOPHILS NFR BLD AUTO: 0.4 %
BILIRUB SERPL-MCNC: 0.3 MG/DL
BUN SERPL-MCNC: 20.6 MG/DL (ref 9.8–20.1)
CALCIUM SERPL-MCNC: 7.9 MG/DL (ref 8.4–10.2)
CHLORIDE SERPL-SCNC: 109 MMOL/L (ref 98–107)
CHOLEST SERPL-MCNC: 159 MG/DL
CHOLEST/HDLC SERPL: 3 {RATIO} (ref 0–5)
CO2 SERPL-SCNC: 28 MMOL/L (ref 23–31)
CREAT SERPL-MCNC: 0.67 MG/DL (ref 0.55–1.02)
CREAT/UREA NIT SERPL: 31
EOSINOPHIL # BLD AUTO: 0.15 X10(3)/MCL (ref 0–0.9)
EOSINOPHIL NFR BLD AUTO: 2.9 %
ERYTHROCYTE [DISTWIDTH] IN BLOOD BY AUTOMATED COUNT: 12.8 % (ref 11.5–17)
GFR SERPLBLD CREATININE-BSD FMLA CKD-EPI: >60 ML/MIN/1.73/M2
GLOBULIN SER-MCNC: 2.2 GM/DL (ref 2.4–3.5)
GLUCOSE SERPL-MCNC: 74 MG/DL (ref 82–115)
HCT VFR BLD AUTO: 31.6 % (ref 37–47)
HDLC SERPL-MCNC: 49 MG/DL (ref 35–60)
HGB BLD-MCNC: 10.1 G/DL (ref 12–16)
IMM GRANULOCYTES # BLD AUTO: 0.01 X10(3)/MCL (ref 0–0.04)
IMM GRANULOCYTES NFR BLD AUTO: 0.2 %
LDLC SERPL CALC-MCNC: 100 MG/DL (ref 50–140)
LYMPHOCYTES # BLD AUTO: 3.22 X10(3)/MCL (ref 0.6–4.6)
LYMPHOCYTES NFR BLD AUTO: 62.4 %
MCH RBC QN AUTO: 30.2 PG (ref 27–31)
MCHC RBC AUTO-ENTMCNC: 32 G/DL (ref 33–36)
MCV RBC AUTO: 94.6 FL (ref 80–94)
MONOCYTES # BLD AUTO: 0.44 X10(3)/MCL (ref 0.1–1.3)
MONOCYTES NFR BLD AUTO: 8.5 %
NEUTROPHILS # BLD AUTO: 1.32 X10(3)/MCL (ref 2.1–9.2)
NEUTROPHILS NFR BLD AUTO: 25.6 %
NRBC BLD AUTO-RTO: 0 %
PLATELET # BLD AUTO: 114 X10(3)/MCL (ref 130–400)
PMV BLD AUTO: 13.4 FL (ref 7.4–10.4)
POTASSIUM SERPL-SCNC: 4.3 MMOL/L (ref 3.5–5.1)
PROT SERPL-MCNC: 4.8 GM/DL (ref 5.8–7.6)
RBC # BLD AUTO: 3.34 X10(6)/MCL (ref 4.2–5.4)
SODIUM SERPL-SCNC: 141 MMOL/L (ref 136–145)
TRIGL SERPL-MCNC: 48 MG/DL (ref 37–140)
TSH SERPL-ACNC: 1.38 UIU/ML (ref 0.35–4.94)
VALPROATE SERPL-MCNC: 41.8 UG/ML (ref 50–100)
VLDLC SERPL CALC-MCNC: 10 MG/DL
WBC # BLD AUTO: 5.16 X10(3)/MCL (ref 4.5–11.5)

## 2025-08-05 PROCEDURE — 80061 LIPID PANEL: CPT | Performed by: FAMILY MEDICINE

## 2025-08-05 PROCEDURE — 80053 COMPREHEN METABOLIC PANEL: CPT | Performed by: FAMILY MEDICINE

## 2025-08-05 PROCEDURE — 84443 ASSAY THYROID STIM HORMONE: CPT | Performed by: FAMILY MEDICINE

## 2025-08-05 PROCEDURE — 85025 COMPLETE CBC W/AUTO DIFF WBC: CPT | Performed by: FAMILY MEDICINE

## 2025-08-05 PROCEDURE — 80164 ASSAY DIPROPYLACETIC ACD TOT: CPT | Performed by: FAMILY MEDICINE

## (undated) DEVICE — TAPE SILK 3IN

## (undated) DEVICE — GLOVE PROTEXIS HYDROGEL SZ6

## (undated) DEVICE — TAPE MEDIPORE 4IN X 2YDS

## (undated) DEVICE — BLADE SAG DUAL CUT 18X90X1.35

## (undated) DEVICE — SUT MCRYL PLUS 2-0 CT-1 36IN

## (undated) DEVICE — PILLOW ABDUCTION FOAM MED

## (undated) DEVICE — ELECTRODE PATIENT RETURN DISP

## (undated) DEVICE — DEVICE CLSR PDS PLUS 1 CT 18IN

## (undated) DEVICE — DRESSING ANTIMIC ISLAND 4X10IN

## (undated) DEVICE — KIT TOTAL HIP HLGC

## (undated) DEVICE — DRESSING TELFA + RECT 6X10IN

## (undated) DEVICE — APPLICATOR CHLORAPREP ORN 26ML

## (undated) DEVICE — ELECTRODE REM POLYHESIVE II

## (undated) DEVICE — BLADE PEAK PLASMA

## (undated) DEVICE — DRAPE INCISE IOBAN 2 23X23IN

## (undated) DEVICE — GLOVE PROTEXIS NEU-THERA SZ6

## (undated) DEVICE — GLOVE PROTEXIS HYDROGEL SZ9

## (undated) DEVICE — GOWN SMARTGOWN 3XL XLONG

## (undated) DEVICE — KIT SURGICAL TURNOVER

## (undated) DEVICE — SUT ETHIBOND XTRA 5 V-37 GR

## (undated) DEVICE — SUT CTD VICRYL CT-1 27

## (undated) DEVICE — GAUZE SPONGE 4'X4 12 PLY

## (undated) DEVICE — SUT STRATAFIX 3-0 30CM

## (undated) DEVICE — SYS CLSR DERMABOND PRINEO 22CM

## (undated) DEVICE — DRAPE STERI U-SHAPED 47X51IN

## (undated) DEVICE — ADHESIVE DERMABOND ADVANCED

## (undated) DEVICE — GLOVE PROTEXIS BLUE LATEX 9

## (undated) DEVICE — COVER HD BACK TABLE 5FT

## (undated) DEVICE — RETRIEVER SUTURE HEWSON DISP

## (undated) DEVICE — DRAPE SURG W/TWL 17 5/8X23